# Patient Record
Sex: MALE | Race: WHITE | Employment: OTHER | URBAN - METROPOLITAN AREA
[De-identification: names, ages, dates, MRNs, and addresses within clinical notes are randomized per-mention and may not be internally consistent; named-entity substitution may affect disease eponyms.]

---

## 2020-03-12 ENCOUNTER — OFFICE VISIT (OUTPATIENT)
Dept: FAMILY MEDICINE CLINIC | Age: 60
End: 2020-03-12
Payer: COMMERCIAL

## 2020-03-12 VITALS
WEIGHT: 222.2 LBS | DIASTOLIC BLOOD PRESSURE: 78 MMHG | BODY MASS INDEX: 31.81 KG/M2 | SYSTOLIC BLOOD PRESSURE: 122 MMHG | HEIGHT: 70 IN | RESPIRATION RATE: 16 BRPM | TEMPERATURE: 97.4 F | HEART RATE: 60 BPM

## 2020-03-12 PROBLEM — E10.41 TYPE 1 DIABETES MELLITUS WITH DIABETIC MONONEUROPATHY (HCC): Status: ACTIVE | Noted: 2020-03-12

## 2020-03-12 PROBLEM — E10.41 DIABETIC MONONEUROPATHY ASSOCIATED WITH TYPE 1 DIABETES MELLITUS (HCC): Status: ACTIVE | Noted: 2020-03-12

## 2020-03-12 PROBLEM — M25.511 CHRONIC RIGHT SHOULDER PAIN: Status: ACTIVE | Noted: 2020-03-12

## 2020-03-12 PROBLEM — J10.1 INFLUENZA A: Status: ACTIVE | Noted: 2020-01-16

## 2020-03-12 PROBLEM — I10 ESSENTIAL HYPERTENSION: Status: ACTIVE | Noted: 2020-03-12

## 2020-03-12 PROBLEM — G89.29 CHRONIC LOW BACK PAIN: Status: ACTIVE | Noted: 2020-03-12

## 2020-03-12 PROBLEM — M54.50 CHRONIC LOW BACK PAIN: Status: ACTIVE | Noted: 2020-03-12

## 2020-03-12 PROBLEM — G89.29 CHRONIC RIGHT SHOULDER PAIN: Status: ACTIVE | Noted: 2020-03-12

## 2020-03-12 PROBLEM — E78.2 MIXED HYPERLIPIDEMIA: Status: ACTIVE | Noted: 2020-03-12

## 2020-03-12 PROBLEM — R91.1 LUNG NODULE: Status: ACTIVE | Noted: 2020-03-12

## 2020-03-12 PROCEDURE — 99204 OFFICE O/P NEW MOD 45 MIN: CPT | Performed by: NURSE PRACTITIONER

## 2020-03-12 RX ORDER — BROMPHENIRAMINE MALEATE, PSEUDOEPHEDRINE HYDROCHLORIDE, AND DEXTROMETHORPHAN HYDROBROMIDE 2; 30; 10 MG/5ML; MG/5ML; MG/5ML
SYRUP ORAL
COMMUNITY
Start: 2020-01-16 | End: 2020-03-12 | Stop reason: ALTCHOICE

## 2020-03-12 RX ORDER — GABAPENTIN 300 MG/1
600 CAPSULE ORAL NIGHTLY
COMMUNITY
Start: 2020-03-07

## 2020-03-12 RX ORDER — INSULIN ASPART 100 [IU]/ML
INJECTION, SOLUTION INTRAVENOUS; SUBCUTANEOUS
COMMUNITY
Start: 2020-03-07

## 2020-03-12 RX ORDER — SERTRALINE HYDROCHLORIDE 100 MG/1
150 TABLET, FILM COATED ORAL DAILY
COMMUNITY
Start: 2020-03-09

## 2020-03-12 RX ORDER — BENZONATATE 200 MG/1
200 CAPSULE ORAL 3 TIMES DAILY PRN
COMMUNITY
Start: 2020-01-16 | End: 2020-05-11

## 2020-03-12 RX ORDER — FLUTICASONE PROPIONATE 50 MCG
1 SPRAY, SUSPENSION (ML) NASAL DAILY
COMMUNITY
Start: 2020-01-16 | End: 2020-03-12 | Stop reason: ALTCHOICE

## 2020-03-12 RX ORDER — ATENOLOL 50 MG/1
100 TABLET ORAL DAILY
Status: ON HOLD | COMMUNITY
End: 2020-05-12 | Stop reason: HOSPADM

## 2020-03-12 RX ORDER — INSULIN DEGLUDEC 200 U/ML
80 INJECTION, SOLUTION SUBCUTANEOUS DAILY
COMMUNITY
Start: 2020-03-07

## 2020-03-12 RX ORDER — SIMVASTATIN 80 MG
80 TABLET ORAL DAILY
COMMUNITY

## 2020-03-12 RX ORDER — QUINAPRIL 40 MG/1
40 TABLET ORAL
COMMUNITY

## 2020-03-12 RX ORDER — BLOOD-GLUCOSE METER
KIT MISCELLANEOUS
COMMUNITY
Start: 2020-03-02

## 2020-03-12 SDOH — ECONOMIC STABILITY: FOOD INSECURITY: WITHIN THE PAST 12 MONTHS, YOU WORRIED THAT YOUR FOOD WOULD RUN OUT BEFORE YOU GOT MONEY TO BUY MORE.: NEVER TRUE

## 2020-03-12 SDOH — ECONOMIC STABILITY: FOOD INSECURITY: WITHIN THE PAST 12 MONTHS, THE FOOD YOU BOUGHT JUST DIDN'T LAST AND YOU DIDN'T HAVE MONEY TO GET MORE.: NEVER TRUE

## 2020-03-12 SDOH — ECONOMIC STABILITY: INCOME INSECURITY: HOW HARD IS IT FOR YOU TO PAY FOR THE VERY BASICS LIKE FOOD, HOUSING, MEDICAL CARE, AND HEATING?: NOT HARD AT ALL

## 2020-03-12 ASSESSMENT — PATIENT HEALTH QUESTIONNAIRE - PHQ9
SUM OF ALL RESPONSES TO PHQ QUESTIONS 1-9: 0
2. FEELING DOWN, DEPRESSED OR HOPELESS: 0
1. LITTLE INTEREST OR PLEASURE IN DOING THINGS: 0
SUM OF ALL RESPONSES TO PHQ QUESTIONS 1-9: 0
SUM OF ALL RESPONSES TO PHQ9 QUESTIONS 1 & 2: 0

## 2020-03-12 ASSESSMENT — ENCOUNTER SYMPTOMS
SHORTNESS OF BREATH: 0
BACK PAIN: 1
COUGH: 0
ABDOMINAL PAIN: 0
NAUSEA: 0

## 2020-03-12 NOTE — PROGRESS NOTES
2 years and had some workout which was unremarkable. Also c/o left flank area for a year and has tried TENs unit with mild relief. States he also had lung lesion and used to follow up with pulmonary. No further imaging study secondary to insurance restrictions. Pt is a remote smoker and quit 1990's     Review of Systems   Constitutional: Negative for chills, diaphoresis and fever. Eyes: Negative for visual disturbance. Respiratory: Negative for cough and shortness of breath. Cardiovascular: Negative for chest pain and palpitations. Gastrointestinal: Negative for abdominal pain and nausea. RUQ abd pain   Endocrine: Negative for polydipsia and polyuria. Genitourinary: Positive for flank pain. Negative for dysuria and hematuria. Musculoskeletal: Positive for arthralgias and back pain. Skin: Negative for wound. Neurological: Positive for numbness (and tingling BLEs). Negative for dizziness and weakness. Psychiatric/Behavioral: Negative for agitation and behavioral problems. Objective:   Physical Exam  Vitals signs and nursing note reviewed. Constitutional:       General: He is not in acute distress. Appearance: Normal appearance. He is obese. HENT:      Nose: Nose normal. No congestion. Eyes:      General: No scleral icterus. Conjunctiva/sclera: Conjunctivae normal.   Neck:      Musculoskeletal: Neck supple. Cardiovascular:      Rate and Rhythm: Normal rate and regular rhythm. Pulses: Normal pulses. Heart sounds: Normal heart sounds. Pulmonary:      Effort: Pulmonary effort is normal. No respiratory distress. Breath sounds: Normal breath sounds. Abdominal:      Palpations: Abdomen is soft. Tenderness: There is abdominal tenderness. Musculoskeletal:         General: Tenderness present. No swelling. Lumbar back: He exhibits tenderness and spasm. Back:    Lymphadenopathy:      Cervical: No cervical adenopathy.    Skin:     General: Skin is warm and dry. Neurological:      Mental Status: He is alert and oriented to person, place, and time. Cranial Nerves: No cranial nerve deficit. Psychiatric:         Mood and Affect: Mood normal.         Behavior: Behavior normal.       Assessment:      1. Type 1 diabetes mellitus with diabetic mononeuropathy (HCC)    2. Essential hypertension    3. Mixed hyperlipidemia    4. Diabetic mononeuropathy associated with type 1 diabetes mellitus ([facility] [address]    5. RUQ abdominal pain    6. Chronic left-sided low back pain without sciatica    7. Screen for colon cancer          Plan:       BP Readings from Last 3 Encounters:   03/12/20 122/78     /78 (Site: Left Upper Arm, Position: Sitting, Cuff Size: Medium Adult)   Pulse 60   Temp 97.4 °F (36.3 °C) (Oral)   Resp 16   Ht 5' 10\" (1.778 m)   Wt 222 lb 3.2 oz (100.8 kg)   BMI 31.88 kg/m²   No results found for: WBC, HGB, HCT, PLT, CHOL, TRIG, HDL, LDLDIRECT, ALT, AST, NA, K, CL, CREATININE, BUN, CO2, TSH, PSA, INR, GLUF, LABA1C, LABMICR  No results found for: CALCIUM, PHOS  No results found for: LDLCALC, LDLCHOLESTEROL, LDLDIRECT      1. Type 1 diabetes mellitus with diabetic mononeuropathy (HCC)  - cont current therapy   - will obtain records from Dr. Mike Sears  - extensive discussion with pt counseling on good control of DM to prevent complications. Pt verbalizes understanding and is agreeable to make diet modifications. 2. Essential hypertension  - stable. No therapy change     3. Mixed hyperlipidemia  - cont statin therapy   - Diet, exercise and weight reduction discussed. 4. Diabetic mononeuropathy associated with type 1 diabetes mellitus ([facility] [address]  - stable. Cont neurontin    5. RUQ abdominal pain/ 6. Chronic left-sided low back pain without sciatica  - obtain records from Dr. Raul Blood office   - labs pending on what has been done per previous pcp     7.  Screen colon cancer   - refer to Gi for conoscopy    Requested Prescriptions      No

## 2020-03-24 ENCOUNTER — TELEPHONE (OUTPATIENT)
Dept: GASTROENTEROLOGY | Age: 60
End: 2020-03-24

## 2020-03-24 RX ORDER — POLYETHYLENE GLYCOL 3350 17 G/17G
POWDER, FOR SOLUTION ORAL
Qty: 238 G | Refills: 0 | Status: ON HOLD | OUTPATIENT
Start: 2020-03-24 | End: 2020-05-11

## 2020-04-11 PROBLEM — J10.1 INFLUENZA A: Status: RESOLVED | Noted: 2020-01-16 | Resolved: 2020-04-11

## 2020-05-04 ENCOUNTER — TELEPHONE (OUTPATIENT)
Dept: GASTROENTEROLOGY | Age: 60
End: 2020-05-04

## 2020-05-11 ENCOUNTER — APPOINTMENT (OUTPATIENT)
Dept: CT IMAGING | Age: 60
End: 2020-05-11
Payer: COMMERCIAL

## 2020-05-11 ENCOUNTER — HOSPITAL ENCOUNTER (OUTPATIENT)
Age: 60
Setting detail: OBSERVATION
Discharge: HOME | End: 2020-05-12
Attending: EMERGENCY MEDICINE | Admitting: INTERNAL MEDICINE
Payer: COMMERCIAL

## 2020-05-11 ENCOUNTER — APPOINTMENT (OUTPATIENT)
Dept: GENERAL RADIOLOGY | Age: 60
End: 2020-05-11
Payer: COMMERCIAL

## 2020-05-11 PROBLEM — R00.1 BRADYCARDIA: Status: ACTIVE | Noted: 2020-05-11

## 2020-05-11 LAB
ABSOLUTE EOS #: 0.1 K/UL (ref 0–0.4)
ABSOLUTE IMMATURE GRANULOCYTE: ABNORMAL K/UL (ref 0–0.3)
ABSOLUTE LYMPH #: 2.8 K/UL (ref 1–4.8)
ABSOLUTE MONO #: 1.4 K/UL (ref 0.1–1.3)
ALBUMIN SERPL-MCNC: 4.4 G/DL (ref 3.5–5.2)
ALBUMIN/GLOBULIN RATIO: ABNORMAL (ref 1–2.5)
ALP BLD-CCNC: 62 U/L (ref 40–129)
ALT SERPL-CCNC: 37 U/L (ref 5–41)
ANION GAP SERPL CALCULATED.3IONS-SCNC: 16 MMOL/L (ref 9–17)
AST SERPL-CCNC: 24 U/L
BASOPHILS # BLD: 1 % (ref 0–2)
BASOPHILS ABSOLUTE: 0.1 K/UL (ref 0–0.2)
BILIRUB SERPL-MCNC: 0.66 MG/DL (ref 0.3–1.2)
BNP INTERPRETATION: NORMAL
BUN BLDV-MCNC: 27 MG/DL (ref 8–23)
BUN/CREAT BLD: ABNORMAL (ref 9–20)
CALCIUM SERPL-MCNC: 10.5 MG/DL (ref 8.6–10.4)
CHLORIDE BLD-SCNC: 97 MMOL/L (ref 98–107)
CO2: 26 MMOL/L (ref 20–31)
CREAT SERPL-MCNC: 1.89 MG/DL (ref 0.7–1.2)
DIFFERENTIAL TYPE: ABNORMAL
EOSINOPHILS RELATIVE PERCENT: 1 % (ref 0–4)
GFR AFRICAN AMERICAN: 44 ML/MIN
GFR NON-AFRICAN AMERICAN: 36 ML/MIN
GFR SERPL CREATININE-BSD FRML MDRD: ABNORMAL ML/MIN/{1.73_M2}
GFR SERPL CREATININE-BSD FRML MDRD: ABNORMAL ML/MIN/{1.73_M2}
GLUCOSE BLD-MCNC: 105 MG/DL (ref 70–99)
GLUCOSE BLD-MCNC: 105 MG/DL (ref 75–110)
GLUCOSE BLD-MCNC: 272 MG/DL (ref 75–110)
HCT VFR BLD CALC: 49.1 % (ref 41–53)
HEMOGLOBIN: 16.7 G/DL (ref 13.5–17.5)
IMMATURE GRANULOCYTES: ABNORMAL %
LIPASE: 26 U/L (ref 13–60)
LYMPHOCYTES # BLD: 18 % (ref 24–44)
MCH RBC QN AUTO: 29.6 PG (ref 26–34)
MCHC RBC AUTO-ENTMCNC: 34.1 G/DL (ref 31–37)
MCV RBC AUTO: 86.8 FL (ref 80–100)
MONOCYTES # BLD: 9 % (ref 1–7)
MYOGLOBIN: 171 NG/ML (ref 28–72)
MYOGLOBIN: 216 NG/ML (ref 28–72)
NRBC AUTOMATED: ABNORMAL PER 100 WBC
PDW BLD-RTO: 14.7 % (ref 11.5–14.9)
PLATELET # BLD: 196 K/UL (ref 150–450)
PLATELET ESTIMATE: ABNORMAL
PMV BLD AUTO: 9.8 FL (ref 6–12)
POTASSIUM SERPL-SCNC: 4 MMOL/L (ref 3.7–5.3)
PRO-BNP: 53 PG/ML
RBC # BLD: 5.65 M/UL (ref 4.5–5.9)
RBC # BLD: ABNORMAL 10*6/UL
SEG NEUTROPHILS: 71 % (ref 36–66)
SEGMENTED NEUTROPHILS ABSOLUTE COUNT: 11.1 K/UL (ref 1.3–9.1)
SODIUM BLD-SCNC: 139 MMOL/L (ref 135–144)
THYROXINE, FREE: 1.06 NG/DL (ref 0.93–1.7)
TOTAL PROTEIN: 7.7 G/DL (ref 6.4–8.3)
TROPONIN INTERP: ABNORMAL
TROPONIN INTERP: ABNORMAL
TROPONIN T: ABNORMAL NG/ML
TROPONIN T: ABNORMAL NG/ML
TROPONIN, HIGH SENSITIVITY: 40 NG/L (ref 0–22)
TROPONIN, HIGH SENSITIVITY: 58 NG/L (ref 0–22)
TSH SERPL DL<=0.05 MIU/L-ACNC: 3.21 MIU/L (ref 0.3–5)
WBC # BLD: 15.5 K/UL (ref 3.5–11)
WBC # BLD: ABNORMAL 10*3/UL

## 2020-05-11 PROCEDURE — 74176 CT ABD & PELVIS W/O CONTRAST: CPT

## 2020-05-11 PROCEDURE — 84439 ASSAY OF FREE THYROXINE: CPT

## 2020-05-11 PROCEDURE — 99285 EMERGENCY DEPT VISIT HI MDM: CPT

## 2020-05-11 PROCEDURE — 85025 COMPLETE CBC W/AUTO DIFF WBC: CPT

## 2020-05-11 PROCEDURE — 84484 ASSAY OF TROPONIN QUANT: CPT

## 2020-05-11 PROCEDURE — 81001 URINALYSIS AUTO W/SCOPE: CPT

## 2020-05-11 PROCEDURE — 2580000003 HC RX 258: Performed by: EMERGENCY MEDICINE

## 2020-05-11 PROCEDURE — 83874 ASSAY OF MYOGLOBIN: CPT

## 2020-05-11 PROCEDURE — 96374 THER/PROPH/DIAG INJ IV PUSH: CPT

## 2020-05-11 PROCEDURE — 83880 ASSAY OF NATRIURETIC PEPTIDE: CPT

## 2020-05-11 PROCEDURE — 6360000002 HC RX W HCPCS: Performed by: EMERGENCY MEDICINE

## 2020-05-11 PROCEDURE — G0378 HOSPITAL OBSERVATION PER HR: HCPCS

## 2020-05-11 PROCEDURE — 84443 ASSAY THYROID STIM HORMONE: CPT

## 2020-05-11 PROCEDURE — 83690 ASSAY OF LIPASE: CPT

## 2020-05-11 PROCEDURE — 80053 COMPREHEN METABOLIC PANEL: CPT

## 2020-05-11 PROCEDURE — 36415 COLL VENOUS BLD VENIPUNCTURE: CPT

## 2020-05-11 PROCEDURE — 82947 ASSAY GLUCOSE BLOOD QUANT: CPT

## 2020-05-11 PROCEDURE — 99220 PR INITIAL OBSERVATION CARE/DAY 70 MINUTES: CPT | Performed by: INTERNAL MEDICINE

## 2020-05-11 PROCEDURE — 71045 X-RAY EXAM CHEST 1 VIEW: CPT

## 2020-05-11 PROCEDURE — 93005 ELECTROCARDIOGRAM TRACING: CPT | Performed by: EMERGENCY MEDICINE

## 2020-05-11 RX ORDER — SIMVASTATIN 40 MG
80 TABLET ORAL DAILY
Status: DISCONTINUED | OUTPATIENT
Start: 2020-05-12 | End: 2020-05-12 | Stop reason: HOSPADM

## 2020-05-11 RX ORDER — DEXTROSE MONOHYDRATE 50 MG/ML
100 INJECTION, SOLUTION INTRAVENOUS PRN
Status: DISCONTINUED | OUTPATIENT
Start: 2020-05-11 | End: 2020-05-12 | Stop reason: HOSPADM

## 2020-05-11 RX ORDER — POLYETHYLENE GLYCOL 3350 17 G/17G
17 POWDER, FOR SOLUTION ORAL DAILY PRN
Status: DISCONTINUED | OUTPATIENT
Start: 2020-05-11 | End: 2020-05-12 | Stop reason: HOSPADM

## 2020-05-11 RX ORDER — MAGNESIUM SULFATE 1 G/100ML
1 INJECTION INTRAVENOUS PRN
Status: DISCONTINUED | OUTPATIENT
Start: 2020-05-11 | End: 2020-05-12 | Stop reason: HOSPADM

## 2020-05-11 RX ORDER — ACETAMINOPHEN 325 MG/1
650 TABLET ORAL EVERY 6 HOURS PRN
Status: DISCONTINUED | OUTPATIENT
Start: 2020-05-11 | End: 2020-05-12 | Stop reason: HOSPADM

## 2020-05-11 RX ORDER — DEXTROSE MONOHYDRATE 25 G/50ML
12.5 INJECTION, SOLUTION INTRAVENOUS PRN
Status: DISCONTINUED | OUTPATIENT
Start: 2020-05-11 | End: 2020-05-12 | Stop reason: HOSPADM

## 2020-05-11 RX ORDER — 0.9 % SODIUM CHLORIDE 0.9 %
1000 INTRAVENOUS SOLUTION INTRAVENOUS ONCE
Status: COMPLETED | OUTPATIENT
Start: 2020-05-11 | End: 2020-05-11

## 2020-05-11 RX ORDER — INSULIN GLARGINE 100 [IU]/ML
80 INJECTION, SOLUTION SUBCUTANEOUS DAILY
Status: DISCONTINUED | OUTPATIENT
Start: 2020-05-12 | End: 2020-05-12 | Stop reason: HOSPADM

## 2020-05-11 RX ORDER — MORPHINE SULFATE 4 MG/ML
4 INJECTION, SOLUTION INTRAMUSCULAR; INTRAVENOUS ONCE
Status: DISCONTINUED | OUTPATIENT
Start: 2020-05-11 | End: 2020-05-12 | Stop reason: HOSPADM

## 2020-05-11 RX ORDER — ONDANSETRON 2 MG/ML
4 INJECTION INTRAMUSCULAR; INTRAVENOUS EVERY 6 HOURS PRN
Status: DISCONTINUED | OUTPATIENT
Start: 2020-05-11 | End: 2020-05-12 | Stop reason: HOSPADM

## 2020-05-11 RX ORDER — GABAPENTIN 300 MG/1
600 CAPSULE ORAL NIGHTLY
Status: DISCONTINUED | OUTPATIENT
Start: 2020-05-12 | End: 2020-05-12 | Stop reason: HOSPADM

## 2020-05-11 RX ORDER — PROMETHAZINE HYDROCHLORIDE 25 MG/1
12.5 TABLET ORAL EVERY 6 HOURS PRN
Status: DISCONTINUED | OUTPATIENT
Start: 2020-05-11 | End: 2020-05-12 | Stop reason: HOSPADM

## 2020-05-11 RX ORDER — POTASSIUM CHLORIDE 7.45 MG/ML
10 INJECTION INTRAVENOUS PRN
Status: DISCONTINUED | OUTPATIENT
Start: 2020-05-11 | End: 2020-05-12 | Stop reason: HOSPADM

## 2020-05-11 RX ORDER — IBUPROFEN 600 MG/1
600 TABLET ORAL NIGHTLY
COMMUNITY

## 2020-05-11 RX ORDER — SODIUM CHLORIDE 0.9 % (FLUSH) 0.9 %
10 SYRINGE (ML) INJECTION EVERY 12 HOURS SCHEDULED
Status: DISCONTINUED | OUTPATIENT
Start: 2020-05-12 | End: 2020-05-12 | Stop reason: HOSPADM

## 2020-05-11 RX ORDER — ACETAMINOPHEN 650 MG/1
650 SUPPOSITORY RECTAL EVERY 6 HOURS PRN
Status: DISCONTINUED | OUTPATIENT
Start: 2020-05-11 | End: 2020-05-12 | Stop reason: HOSPADM

## 2020-05-11 RX ORDER — POTASSIUM CHLORIDE 20 MEQ/1
40 TABLET, EXTENDED RELEASE ORAL PRN
Status: DISCONTINUED | OUTPATIENT
Start: 2020-05-11 | End: 2020-05-12 | Stop reason: HOSPADM

## 2020-05-11 RX ORDER — SODIUM CHLORIDE 0.9 % (FLUSH) 0.9 %
10 SYRINGE (ML) INJECTION PRN
Status: DISCONTINUED | OUTPATIENT
Start: 2020-05-11 | End: 2020-05-12 | Stop reason: HOSPADM

## 2020-05-11 RX ORDER — NICOTINE POLACRILEX 4 MG
15 LOZENGE BUCCAL PRN
Status: DISCONTINUED | OUTPATIENT
Start: 2020-05-11 | End: 2020-05-12 | Stop reason: HOSPADM

## 2020-05-11 RX ORDER — ONDANSETRON 2 MG/ML
4 INJECTION INTRAMUSCULAR; INTRAVENOUS ONCE
Status: COMPLETED | OUTPATIENT
Start: 2020-05-11 | End: 2020-05-11

## 2020-05-11 RX ADMIN — ONDANSETRON 4 MG: 2 INJECTION INTRAMUSCULAR; INTRAVENOUS at 18:00

## 2020-05-11 RX ADMIN — SODIUM CHLORIDE 1000 ML: 9 INJECTION, SOLUTION INTRAVENOUS at 18:00

## 2020-05-11 ASSESSMENT — ENCOUNTER SYMPTOMS
WHEEZING: 0
DIARRHEA: 0
ABDOMINAL PAIN: 0
CONSTIPATION: 0
EYE PAIN: 0
SINUS PRESSURE: 0
VOMITING: 1
BLOOD IN STOOL: 0
BACK PAIN: 0
TROUBLE SWALLOWING: 0
SORE THROAT: 0
COUGH: 0
EYE DISCHARGE: 0
COLOR CHANGE: 0
FACIAL SWELLING: 0
RHINORRHEA: 0
SHORTNESS OF BREATH: 1
NAUSEA: 1
CHEST TIGHTNESS: 0
EYE REDNESS: 0

## 2020-05-11 ASSESSMENT — PAIN SCALES - GENERAL: PAINLEVEL_OUTOF10: 3

## 2020-05-11 NOTE — ED PROVIDER NOTES
use. He reports that he does not use drugs. PHYSICAL EXAM     INITIAL VITALS: /68   Pulse 50   Temp 97.4 °F (36.3 °C) (Oral)   Resp 15   Ht 5' 10\" (1.778 m)   Wt 220 lb (99.8 kg)   SpO2 100%   BMI 31.57 kg/m²      Physical Exam  Vitals signs and nursing note reviewed. Constitutional:       General: He is not in acute distress. Appearance: He is well-developed. He is not diaphoretic. HENT:      Head: Normocephalic and atraumatic. Eyes:      General: No scleral icterus. Right eye: No discharge. Left eye: No discharge. Conjunctiva/sclera: Conjunctivae normal.      Pupils: Pupils are equal, round, and reactive to light. Cardiovascular:      Rate and Rhythm: Regular rhythm. Bradycardia present. Heart sounds: Normal heart sounds. No murmur. No friction rub. No gallop. Pulmonary:      Effort: Pulmonary effort is normal. No respiratory distress. Breath sounds: Normal breath sounds. No wheezing or rales. Chest:      Chest wall: No tenderness. Abdominal:      General: Bowel sounds are normal. There is no distension. Palpations: Abdomen is soft. There is no mass. Tenderness: There is generalized abdominal tenderness. There is no guarding or rebound. Negative signs include Martins's sign and McBurney's sign. Musculoskeletal: Normal range of motion. General: No tenderness. Skin:     General: Skin is warm and dry. Coloration: Skin is not pale. Findings: No erythema or rash. Neurological:      Mental Status: He is alert and oriented to person, place, and time. Cranial Nerves: No cranial nerve deficit. Sensory: No sensory deficit. Motor: No abnormal muscle tone. Coordination: Coordination normal.      Deep Tendon Reflexes: Reflexes normal.   Psychiatric:         Behavior: Behavior normal.         Thought Content:  Thought content normal.         Judgment: Judgment normal.         MEDICAL DECISION MAKING:     Patient demonstrates mild calcification without aneurysm. GI/Bowel: Stomach is grossly unremarkable. Small bowel appears nondilated. No acute colonic abnormality. Pelvis: Urinary bladder and prostate gland all appear grossly unremarkable. Peritoneum/Retroperitoneum: No free air, free fluid or lymphadenopathy. Bones/Soft Tissues: Abdominal wall demonstrates no acute findings. Osseous structures demonstrate degenerative change. Hardware fixation presumed L4-L5 level. No acute intra-abdominal process. Xr Chest Portable    Result Date: 5/11/2020  EXAMINATION: ONE XRAY VIEW OF THE CHEST 5/11/2020 6:37 pm COMPARISON: None. HISTORY: ORDERING SYSTEM PROVIDED HISTORY: Chest Pain TECHNOLOGIST PROVIDED HISTORY: Chest Pain Reason for Exam: Chest pain Acuity: Acute Type of Exam: Initial FINDINGS: The lungs are without acute focal process. There is no effusion or pneumothorax. The cardiomediastinal silhouette is without acute process. The osseous structures are without acute process. No acute process. LABS: All lab results were reviewed byarlyn, and all abnormals are listed below.   Labs Reviewed   CBC WITH AUTO DIFFERENTIAL - Abnormal; Notable for the following components:       Result Value    WBC 15.5 (*)     Seg Neutrophils 71 (*)     Lymphocytes 18 (*)     Monocytes 9 (*)     Segs Absolute 11.10 (*)     Absolute Mono # 1.40 (*)     All other components within normal limits   TROP/MYOGLOBIN - Abnormal; Notable for the following components:    Troponin, High Sensitivity 58 (*)     Myoglobin 216 (*)     All other components within normal limits   COMPREHENSIVE METABOLIC PANEL - Abnormal; Notable for the following components:    Glucose 105 (*)     BUN 27 (*)     CREATININE 1.89 (*)     Calcium 10.5 (*)     Chloride 97 (*)     GFR Non- 36 (*)     GFR  44 (*)     All other components within normal limits   BRAIN NATRIURETIC PEPTIDE   TSH WITHOUT REFLEX   T4, FREE   LIPASE TROP/MYOGLOBIN   URINE RT REFLEX TO CULTURE   POC GLUCOSE FINGERSTICK         EMERGENCY DEPARTMENT COURSE:   Vitals:    Vitals:    05/11/20 1830 05/11/20 1845 05/11/20 1900 05/11/20 1915   BP: (!) 97/51 (!) 93/59 116/64 117/68   Pulse: (!) 48 (!) 49 (!) 49 50   Resp: 18 20 16 15   Temp:       TempSrc:       SpO2: 100% 99% 100% 100%   Weight:       Height:           The patient was given the following medications while in the emergency department:     Orders Placed This Encounter   Medications    0.9 % sodium chloride bolus    ondansetron (ZOFRAN) injection 4 mg    morphine sulfate (PF) injection 4 mg    DISCONTD: piperacillin-tazobactam (ZOSYN) 4.5 g in dextrose 5 % 100 mL IVPB (mini-bag)       -------------------------  8:19 PM EDT  Patient was updated on results he is feeling better and his blood pressure has improved with some fluids. Because of his initial presentation and lab work I would like to get him admitted for now. He is okay with that. I spoke with Gerber Dawson the nurse practitioner who will admit him. CRITICAL CARE:   None    CONSULTS:  None    PROCEDURES:  None    FINAL IMPRESSION      1. Bradycardia    2. Dehydration          DISPOSITION/PLAN   DISPOSITION Admitted 05/11/2020 08:19:16 PM      PATIENT REFERRED TO:  No follow-up provider specified.     DISCHARGE MEDICATIONS:  New Prescriptions    No medications on file       (Please note that portions of this note were completed with a voice recognition program.  Efforts were made to edit the dictations but occasionally words are mis-transcribed.)    Eran Jaramillo MD  Attending Colletta Emmer, MD  05/11/20 2021

## 2020-05-11 NOTE — ED TRIAGE NOTES
Mode of arrival (squad #, walk in, police, etc) : drop off        Chief complaint(s): SOB, lightheaded        Arrival Note (brief scenario, treatment PTA, etc). : Pt states he is feeling SOB since last night. Pt also reports that his BS at home was 110 but he states his normal is around 250. Pt reports feeling lightheaded and fatigued. Pt is [place], pt appears unwell. Pt is A&Ox4, respirations are even and unlabored, pt ia hypotensive and bradycardic. Pt reports his blood pressure is normally on the lower side. C= \"Have you ever felt that you should Cut down on your drinking? \"  No  A= \"Have people Annoyed you by criticizing your drinking? \"  No  G= \"Have you ever felt bad or Guilty about your drinking? \"  No  E= \"Have you ever had a drink as an Eye-opener first thing in the morning to steady your nerves or to help a hangover? \"  No      Deferred []      Reason for deferring: N/A    *If yes to two or more: probable alcohol abuse. *

## 2020-05-12 ENCOUNTER — APPOINTMENT (OUTPATIENT)
Dept: NUCLEAR MEDICINE | Age: 60
End: 2020-05-12
Payer: COMMERCIAL

## 2020-05-12 VITALS
TEMPERATURE: 97.5 F | WEIGHT: 224.87 LBS | HEIGHT: 70 IN | HEART RATE: 61 BPM | RESPIRATION RATE: 18 BRPM | BODY MASS INDEX: 32.19 KG/M2 | SYSTOLIC BLOOD PRESSURE: 127 MMHG | DIASTOLIC BLOOD PRESSURE: 68 MMHG | OXYGEN SATURATION: 100 %

## 2020-05-12 LAB
-: ABNORMAL
AMORPHOUS: ABNORMAL
ANION GAP SERPL CALCULATED.3IONS-SCNC: 12 MMOL/L (ref 9–17)
BACTERIA: ABNORMAL
BILIRUBIN URINE: NEGATIVE
BUN BLDV-MCNC: 34 MG/DL (ref 8–23)
BUN/CREAT BLD: ABNORMAL (ref 9–20)
CALCIUM SERPL-MCNC: 9 MG/DL (ref 8.6–10.4)
CASTS UA: ABNORMAL /LPF
CHLORIDE BLD-SCNC: 97 MMOL/L (ref 98–107)
CO2: 26 MMOL/L (ref 20–31)
COLOR: ABNORMAL
COMMENT UA: ABNORMAL
CREAT SERPL-MCNC: 1.48 MG/DL (ref 0.7–1.2)
CRYSTALS, UA: ABNORMAL /HPF
CRYSTALS, UA: ABNORMAL /HPF
EKG ATRIAL RATE: 48 BPM
EKG ATRIAL RATE: 50 BPM
EKG P AXIS: 34 DEGREES
EKG P AXIS: 36 DEGREES
EKG P-R INTERVAL: 138 MS
EKG P-R INTERVAL: 144 MS
EKG Q-T INTERVAL: 466 MS
EKG Q-T INTERVAL: 476 MS
EKG QRS DURATION: 78 MS
EKG QRS DURATION: 84 MS
EKG QTC CALCULATION (BAZETT): 424 MS
EKG QTC CALCULATION (BAZETT): 425 MS
EKG R AXIS: -7 DEGREES
EKG R AXIS: -9 DEGREES
EKG T AXIS: 32 DEGREES
EKG T AXIS: 58 DEGREES
EKG VENTRICULAR RATE: 48 BPM
EKG VENTRICULAR RATE: 50 BPM
EPITHELIAL CELLS UA: ABNORMAL /HPF
GFR AFRICAN AMERICAN: 59 ML/MIN
GFR NON-AFRICAN AMERICAN: 48 ML/MIN
GFR SERPL CREATININE-BSD FRML MDRD: ABNORMAL ML/MIN/{1.73_M2}
GFR SERPL CREATININE-BSD FRML MDRD: ABNORMAL ML/MIN/{1.73_M2}
GLUCOSE BLD-MCNC: 304 MG/DL (ref 75–110)
GLUCOSE BLD-MCNC: 324 MG/DL (ref 75–110)
GLUCOSE BLD-MCNC: 359 MG/DL (ref 70–99)
GLUCOSE BLD-MCNC: 382 MG/DL (ref 75–110)
GLUCOSE URINE: ABNORMAL
HCT VFR BLD CALC: 45.5 % (ref 41–53)
HEMOGLOBIN: 15.4 G/DL (ref 13.5–17.5)
INR BLD: 1
KETONES, URINE: ABNORMAL
LEUKOCYTE ESTERASE, URINE: NEGATIVE
LV EF: 55 %
LV EF: 62 %
LVEF MODALITY: NORMAL
LVEF MODALITY: NORMAL
MCH RBC QN AUTO: 29.8 PG (ref 26–34)
MCHC RBC AUTO-ENTMCNC: 33.9 G/DL (ref 31–37)
MCV RBC AUTO: 87.9 FL (ref 80–100)
MUCUS: ABNORMAL
NITRITE, URINE: NEGATIVE
NRBC AUTOMATED: ABNORMAL PER 100 WBC
OTHER OBSERVATIONS UA: ABNORMAL
PDW BLD-RTO: 15 % (ref 11.5–14.9)
PH UA: 5.5 (ref 5–8)
PLATELET # BLD: 138 K/UL (ref 150–450)
PMV BLD AUTO: 9.7 FL (ref 6–12)
POTASSIUM SERPL-SCNC: 4.5 MMOL/L (ref 3.7–5.3)
PROTEIN UA: ABNORMAL
PROTHROMBIN TIME: 13.3 SEC (ref 11.8–14.6)
RBC # BLD: 5.17 M/UL (ref 4.5–5.9)
RBC UA: ABNORMAL /HPF
RENAL EPITHELIAL, UA: ABNORMAL /HPF
SODIUM BLD-SCNC: 135 MMOL/L (ref 135–144)
SPECIFIC GRAVITY UA: 1.03 (ref 1–1.03)
TRICHOMONAS: ABNORMAL
TURBIDITY: ABNORMAL
URINE HGB: NEGATIVE
UROBILINOGEN, URINE: NORMAL
WBC # BLD: 10.9 K/UL (ref 3.5–11)
WBC UA: ABNORMAL /HPF
YEAST: ABNORMAL

## 2020-05-12 PROCEDURE — 93010 ELECTROCARDIOGRAM REPORT: CPT | Performed by: INTERNAL MEDICINE

## 2020-05-12 PROCEDURE — 6360000002 HC RX W HCPCS: Performed by: NURSE PRACTITIONER

## 2020-05-12 PROCEDURE — 6370000000 HC RX 637 (ALT 250 FOR IP): Performed by: NURSE PRACTITIONER

## 2020-05-12 PROCEDURE — 2580000003 HC RX 258: Performed by: INTERNAL MEDICINE

## 2020-05-12 PROCEDURE — 3430000000 HC RX DIAGNOSTIC RADIOPHARMACEUTICAL: Performed by: INTERNAL MEDICINE

## 2020-05-12 PROCEDURE — 6360000002 HC RX W HCPCS: Performed by: INTERNAL MEDICINE

## 2020-05-12 PROCEDURE — 36415 COLL VENOUS BLD VENIPUNCTURE: CPT

## 2020-05-12 PROCEDURE — G0378 HOSPITAL OBSERVATION PER HR: HCPCS

## 2020-05-12 PROCEDURE — 82947 ASSAY GLUCOSE BLOOD QUANT: CPT

## 2020-05-12 PROCEDURE — 80048 BASIC METABOLIC PNL TOTAL CA: CPT

## 2020-05-12 PROCEDURE — A9500 TC99M SESTAMIBI: HCPCS | Performed by: INTERNAL MEDICINE

## 2020-05-12 PROCEDURE — 93017 CV STRESS TEST TRACING ONLY: CPT

## 2020-05-12 PROCEDURE — 2580000003 HC RX 258: Performed by: NURSE PRACTITIONER

## 2020-05-12 PROCEDURE — 93306 TTE W/DOPPLER COMPLETE: CPT

## 2020-05-12 PROCEDURE — 85610 PROTHROMBIN TIME: CPT

## 2020-05-12 PROCEDURE — 78452 HT MUSCLE IMAGE SPECT MULT: CPT

## 2020-05-12 PROCEDURE — 85027 COMPLETE CBC AUTOMATED: CPT

## 2020-05-12 PROCEDURE — 99217 PR OBSERVATION CARE DISCHARGE MANAGEMENT: CPT | Performed by: INTERNAL MEDICINE

## 2020-05-12 RX ORDER — AMINOPHYLLINE DIHYDRATE 25 MG/ML
50 INJECTION, SOLUTION INTRAVENOUS PRN
Status: ACTIVE | OUTPATIENT
Start: 2020-05-12 | End: 2020-05-12

## 2020-05-12 RX ORDER — SODIUM CHLORIDE 0.9 % (FLUSH) 0.9 %
10 SYRINGE (ML) INJECTION PRN
Status: DISCONTINUED | OUTPATIENT
Start: 2020-05-12 | End: 2020-05-12 | Stop reason: HOSPADM

## 2020-05-12 RX ORDER — METOPROLOL TARTRATE 5 MG/5ML
5 INJECTION INTRAVENOUS EVERY 5 MIN PRN
Status: ACTIVE | OUTPATIENT
Start: 2020-05-12 | End: 2020-05-12

## 2020-05-12 RX ORDER — SODIUM CHLORIDE 0.9 % (FLUSH) 0.9 %
10 SYRINGE (ML) INJECTION PRN
Status: ACTIVE | OUTPATIENT
Start: 2020-05-12 | End: 2020-05-12

## 2020-05-12 RX ORDER — SODIUM CHLORIDE 9 MG/ML
500 INJECTION, SOLUTION INTRAVENOUS CONTINUOUS PRN
Status: ACTIVE | OUTPATIENT
Start: 2020-05-12 | End: 2020-05-12

## 2020-05-12 RX ORDER — ALBUTEROL SULFATE 90 UG/1
2 AEROSOL, METERED RESPIRATORY (INHALATION) PRN
Status: ACTIVE | OUTPATIENT
Start: 2020-05-12 | End: 2020-05-12

## 2020-05-12 RX ORDER — NITROGLYCERIN 0.4 MG/1
0.4 TABLET SUBLINGUAL EVERY 5 MIN PRN
Status: ACTIVE | OUTPATIENT
Start: 2020-05-12 | End: 2020-05-12

## 2020-05-12 RX ORDER — SODIUM CHLORIDE 9 MG/ML
INJECTION, SOLUTION INTRAVENOUS CONTINUOUS
Status: DISCONTINUED | OUTPATIENT
Start: 2020-05-12 | End: 2020-05-12 | Stop reason: HOSPADM

## 2020-05-12 RX ORDER — ATROPINE SULFATE 0.1 MG/ML
0.5 INJECTION INTRAVENOUS EVERY 5 MIN PRN
Status: ACTIVE | OUTPATIENT
Start: 2020-05-12 | End: 2020-05-12

## 2020-05-12 RX ADMIN — INSULIN LISPRO 8 UNITS: 100 INJECTION, SOLUTION INTRAVENOUS; SUBCUTANEOUS at 12:03

## 2020-05-12 RX ADMIN — SODIUM CHLORIDE: 9 INJECTION, SOLUTION INTRAVENOUS at 04:00

## 2020-05-12 RX ADMIN — Medication 10 ML: at 10:04

## 2020-05-12 RX ADMIN — SIMVASTATIN 80 MG: 40 TABLET, FILM COATED ORAL at 08:52

## 2020-05-12 RX ADMIN — Medication 10 ML: at 10:05

## 2020-05-12 RX ADMIN — TETRAKIS(2-METHOXYISOBUTYLISOCYANIDE)COPPER(I) TETRAFLUOROBORATE 39.4 MILLICURIE: 1 INJECTION, POWDER, LYOPHILIZED, FOR SOLUTION INTRAVENOUS at 12:32

## 2020-05-12 RX ADMIN — ENOXAPARIN SODIUM 40 MG: 40 INJECTION SUBCUTANEOUS at 08:48

## 2020-05-12 RX ADMIN — REGADENOSON 0.4 MG: 0.08 INJECTION, SOLUTION INTRAVENOUS at 10:04

## 2020-05-12 RX ADMIN — SERTRALINE HYDROCHLORIDE 150 MG: 100 TABLET ORAL at 08:47

## 2020-05-12 RX ADMIN — SODIUM CHLORIDE: 9 INJECTION, SOLUTION INTRAVENOUS at 12:40

## 2020-05-12 RX ADMIN — INSULIN LISPRO 3 UNITS: 100 INJECTION, SOLUTION INTRAVENOUS; SUBCUTANEOUS at 00:26

## 2020-05-12 RX ADMIN — TETRAKIS(2-METHOXYISOBUTYLISOCYANIDE)COPPER(I) TETRAFLUOROBORATE 14 MILLICURIE: 1 INJECTION, POWDER, LYOPHILIZED, FOR SOLUTION INTRAVENOUS at 10:05

## 2020-05-12 RX ADMIN — INSULIN GLARGINE 80 UNITS: 100 INJECTION, SOLUTION SUBCUTANEOUS at 08:47

## 2020-05-12 ASSESSMENT — ENCOUNTER SYMPTOMS
WHEEZING: 0
VOMITING: 1
SORE THROAT: 0
COUGH: 0
ABDOMINAL PAIN: 0
NAUSEA: 1
SHORTNESS OF BREATH: 1
CONSTIPATION: 0
DIARRHEA: 0
BACK PAIN: 0

## 2020-05-12 NOTE — H&P
[address] [facility]     HISTORY AND PHYSICAL EXAMINATION            Date:   5/12/2020  Patientname:  Sis Mcgill  Date of admission:  5/11/2020  5:37 PM  MRN:   [id number]  Account:  [de-identified]  YOB: 1960  PCP:    No primary care provider on file. Room:   2091/2091-01  Code Status:    Full Code    CHIEF COMPLAINT     Chief Complaint   Patient presents with    Shortness of Breath    Hypoglycemia     pt states low for him       HISTORY OF PRESENT ILLNESS  (Character, Onset, Location, Duration,  Exacerbating/RelievingFactors, Radiation,   Associated Symptoms, Severity )     The patient is a 62 y.o.  male, with a history of insulin-dependent diabetes mellitus with neuropathy, hypertension, chronic low back pain, and hyperlipidemia who presents with shortness of breath and hypoglycemia. According to patient, he developed shortness of breath and lightheadedness yesterday and has felt generally unwell since that time. Reports that on the previous evening he had an electrical sensation that initiated in his left shoulder and radiated across his left chest to his sternum every 3 to 4 seconds for several hours. Patient likens this sensation to wearing a TENS unit. Sensation was self-limiting and was not associated with pain. Symptoms are associated with generalized weakness and myalgias, with a sensation that he could not control his legs. Additionally, patient reports nausea with one episode of emesis earlier today. Denies fever, chills, chest pain, cough, abdominal pain, diarrhea, and urinary symptoms. There are no aggravating or alleviating factors. Symptoms are reported as constant and moderate.     PAST MEDICAL HISTORY   Patient  has a past medical history of Adenomatous polyp of colon, ALT (SGPT) level raised, Asbestos exposure, Asthma, BPH (benign prostatic hyperplasia), Diabetes mellitus ([address] ED (erectile dysfunction), Esophageal reflux, Hearing loss, Coloration: Skin is not pale. Findings: No erythema or rash. Neurological:      Mental Status: He is alert and oriented to person, place, and time. Motor: No seizure activity. Coordination: Coordination normal.   Psychiatric:         Behavior: Behavior normal.         Thought Content: Thought content normal.       DIAGNOSTICS      EKG:   EKG 12 Lead [[id number]]    Collected: 05/11/20 2026    Updated: 05/11/20 2032     Ventricular Rate 50 BPM    Atrial Rate 50 BPM    P-R Interval 144 ms    QRS Duration 78 ms    Q-T Interval 466 ms    QTc Calculation (Bazett) 424 ms    P Axis 36 degrees    R Axis -7 degrees    T Axis 32 degrees   Narrative:     * Poor data quality, interpretation may be adversely affected  Sinus bradycardia  Minimal voltage criteria for LVH, may be normal variant  Nonspecific T wave abnormality  Abnormal ECG  When compared with ECG of 11-MAY-2020 17:57, (unconfirmed)  No significant change was found     Labs:  CBC:   Recent Labs     05/11/20  1755   WBC 15.5*   HGB 16.7        BMP:    Recent Labs     05/11/20  1755      K 4.0   CL 97*   CO2 26   BUN 27*   CREATININE 1.89*   GLUCOSE 105*     S. Calcium:  Recent Labs     05/11/20 1755   CALCIUM 10.5*     S. Ionized Calcium:No results for input(s): IONCA in the last 72 hours. S. Magnesium:No results for input(s): MG in the last 72 hours. S. Phosphorus:No results for input(s): PHOS in the last 72 hours. S. Glucose:  Recent Labs     05/11/20  1751 05/11/20  2317   POCGLU 105 272*     Glycosylated hemoglobin A1C: No results found for: LABA1C  Hepatic:   Recent Labs     05/11/20  1755   AST 24   ALT 37   ALKPHOS 62     CARDIAC ENZY:   Recent Labs     05/11/20  1755 05/11/20  2208   TROPHS 58* 40*   MYOGLOBIN 216* 171*     INR: No results for input(s): INR in the last 72 hours. BNP:   Recent Labs     05/11/20  1755   PROBNP 53      ABGs: No results for input(s): PH, PCO2, PO2, HCO3, O2SAT in the last 72 hours.   Lipids: No

## 2020-05-12 NOTE — PROGRESS NOTES
PTs blood sugar noted at 382. Pt refuses to take insulin, says he will \"take care of it at home\". Pt is anxious to leave. Pt is very addiment that this blood sugar is normal for him. Extensive education about how hyperglycemia causes significant harm to the body has been provided to pt throughout the day and was strongly reinforced this evening. Pt continues to refuse insulin here and just wants to go home.      Electronically signed by Loni Arita RN on 5/12/2020 at 4:54 PM

## 2020-05-12 NOTE — ED NOTES
Report given to BEST tai from u. Report method by phone   The following was reviewed with receiving RN:   Current vital signs:  BP (!) 104/50   Pulse 56   Temp 97.4 °F (36.3 °C) (Oral)   Resp 16   Ht 5' 10\" (1.778 m)   Wt 220 lb (99.8 kg)   SpO2 100%   BMI 31.57 kg/m²                MEWS Score: 3     Any medication or safety alerts were reviewed. Any pending diagnostics and notifications were also reviewed, as well as any safety concerns or issues, abnormal labs, abnormal imaging, and abnormal assessment findings. Questions were answered.             [facility] [place]  05/11/20 [id number]

## 2020-05-12 NOTE — CARE COORDINATION
CASE MANAGEMENT NOTE:    Admission Date:  5/11/2020 Shayla Herring is a 62 y.o.  male    Admitted for : Bradycardia [R00.1]    Met with:  Patient    PCP:  Stated sees an NP at Dr Niharika Christine office                                Insurance:  Francisco Snowden      Current Residence/ Living Arrangements:  independently at home             Current Services PTA:  No    Is patient agreeable to VNS: No    Freedom of choice provided:  Yes    List of [address] provided: NA    VNS chosen:  NA    DME:  glucometer    Home Oxygen: No    Nebulizer: No    CPAP/BIPAP: No    Supplier: N/A    Potential Assistance Needed: Will follow    SNF needed: No    Freedom of choice and list provided: NA    Pharmacy:  [facility]       Does Patient want to use MEDS to BEDS? No    Is the Patient an BEBETO MOCTEZUMA [facility] with Readmission Risk Score greater than 14%? No  If yes, pt needs a follow up appointment made within 7 days. Family Members/Caregivers that pt would like involved in their care:    Yes    If yes, list name here:  Hang Rodrigues and [address] [place] [address],[address]    Transportation Provider:  Patient             Is patient in Isolation/One on One/Altered Mental Status? No  If yes, skip next question. If no, would they like an I-Pad to  use? No  If yes, call [phone]. Discharge Plan:  5/12/2020 [employer]; From home alone- independent and drives; DME- glucometer; Declines VNS; Cardiology consulted and stress test today//SHA                 Electronically signed by:  Donald Jacob RN on 5/12/2020 at 2:19 PM

## 2020-05-12 NOTE — DISCHARGE SUMMARY
[place] [address] Internal Medicine    Discharge Summary     Patient ID: Enedina Adams  :  1960   MRN: [id number]     ACCOUNT:  [de-identified]   Patient's PCP: No primary care provider on file. Admit Date: 2020   Discharge Date: 2020    Length of Stay: 0  Code Status:  Full Code  Admitting Physician: Yana Robb MD  Discharge Physician: Filipe Decker MD     Active Discharge Diagnoses:     Primary Problem  Bradycardia      [place] Problems    Diagnosis Date Noted    Bradycardia [R00.1] 2020    Type 1 diabetes mellitus with diabetic mononeuropathy ([facility] [place] [address] [E10.41] 2020       Admission Condition:  fair     Discharged Condition: fair    Hospital Stay:     Hospital Course:  Enedina Adams is a 62 y.o. male who was admitted for the management of Bradycardia , presented to ER with Shortness of Breath and Hypoglycemia (pt states low for him)  59-year-old gentleman with a history of uncontrolled diabetes mellitus and on high-dose of beta-blocker admitted with dyspnea and fatigue dyspnea was exertional feeling dizzy noted to have severe bradycardia heart rate in 30 condition diagnosed as overdose of beta-blocker beta-blockers taken off stress test was done no ischemia noted patient discharged off beta-blocker advised to follow-up in office may need a low-dose of beta-blocker patient was on atenolol 100 mg        Significant therapeutic interventions:     Significant Diagnostic Studies:   Labs / Micro:        ,     Radiology:    Ct Abdomen Pelvis Wo Contrast Additional Contrast? None    Result Date: 2020  EXAMINATION: CT OF THE ABDOMEN AND PELVIS WITHOUT CONTRAST 2020 7:36 pm TECHNIQUE: CT of the abdomen and pelvis was performed without the administration of intravenous contrast. Multiplanar reformatted images are provided for review.  Dose modulation, iterative reconstruction, and/or weight based adjustment of the mA/kV was utilized to reduce the radiation dose to as low as reasonably achievable. COMPARISON: None. HISTORY: ORDERING SYSTEM PROVIDED HISTORY: Pain TECHNOLOGIST PROVIDED HISTORY: Pain Reason for Exam: pt states he is always in pain, nausea and vomiting since this morning Acuity: Unknown Type of Exam: Unknown FINDINGS: Lower Chest: No acute findings. Organs: Suboptimal evaluation due to lack of IV contrast.  Liver demonstrates hepatic steatosis. Gallbladder, spleen, pancreas and adrenal glands all appear unremarkable. Kidneys demonstrate no evidence of stone or hydronephrosis. Abdominal aorta demonstrates mild calcification without aneurysm. GI/Bowel: Stomach is grossly unremarkable. Small bowel appears nondilated. No acute colonic abnormality. Pelvis: Urinary bladder and prostate gland all appear grossly unremarkable. Peritoneum/Retroperitoneum: No free air, free fluid or lymphadenopathy. Bones/Soft Tissues: Abdominal wall demonstrates no acute findings. Osseous structures demonstrate degenerative change. Hardware fixation presumed L4-L5 level. No acute intra-abdominal process. Xr Chest Portable    Result Date: 5/11/2020  EXAMINATION: ONE XRAY VIEW OF THE CHEST 5/11/2020 6:37 pm COMPARISON: None. HISTORY: ORDERING SYSTEM PROVIDED HISTORY: Chest Pain TECHNOLOGIST PROVIDED HISTORY: Chest Pain Reason for Exam: Chest pain Acuity: Acute Type of Exam: Initial FINDINGS: The lungs are without acute focal process. There is no effusion or pneumothorax. The cardiomediastinal silhouette is without acute process. The osseous structures are without acute process. No acute process. Nm Cardiac Stress Test Nuclear Imaging    Result Date: 5/12/2020  EXAMINATION: MYOCARDIAL PERFUSION IMAGING 5/12/2020 10:20 am TECHNIQUE: For the rest study, 39.4 mCi of Tc 99 labeled sestamibi were injected. SPECT images were acquired. Under cardiology supervision, 0.4mg [place] was infused.   After pharmacologic stress, 14 mCi of

## 2020-05-12 NOTE — PLAN OF CARE
Problem: Pain:  Goal: Pain level will decrease  Description: Pain level will decrease  5/12/2020 1555 by Cody Palafox RN  Outcome: Ongoing  Note: Pt medicated with pain medication prn. Assessed all pain characteristics including level, type, location, frequency, and onset. Non-pharmacologic interventions offered to pt as well. Pt states pain is tolerable at this time.  Will continue to monitor       Problem: Cardiac:  Goal: Ability to maintain vital signs within normal range will improve  Description: Ability to maintain vital signs within normal range will improve  5/12/2020 1555 by Cody Palafox RN  Outcome: Ongoing  Note: Vitals are stable

## 2020-05-12 NOTE — PROGRESS NOTES
LEXISCAN COMPLETED, PATIENT STATED HE HAD A FUNNY FEELING IN ABDOMINAL AREA, DENIES CHEST PAIN AND SHORTNESS OF BREATH. CAFFEINE GIVEN AFTER ONE MINUTE. /83, HR 71.

## 2020-05-12 NOTE — CONSULTS
100 UNIT/ML injection pen Inject into the skin 3 times daily (before meals) maximum 150 units/day 3/7/20  Yes Historical Provider, MD   TREHAIDER FLEXTOUCH 200 UNIT/ML SOPN Inject 80 Units into the skin daily  3/7/20  Yes Historical Provider, MD   quinapril (ACCUPRIL) 40 MG tablet Take 40 mg by mouth every morning (before breakfast)   Yes Historical Provider, MD   sertraline (ZOLOFT) 100 MG tablet Take 150 mg by mouth daily 1.5 tablets 3/9/20  Yes Historical Provider, MD   simvastatin (ZOCOR) 80 MG tablet Take 80 mg by mouth daily    Yes Historical Provider, MD   FREESTYLE LITE strip  3/2/20   Historical Provider, MD       Current Medications: Scheduled Meds:   morphine  4 mg Intravenous Once    gabapentin  600 mg Oral Nightly    sertraline  150 mg Oral Daily    simvastatin  80 mg Oral Daily    insulin lispro  0-12 Units Subcutaneous TID WC    insulin lispro  0-6 Units Subcutaneous Nightly    sodium chloride flush  10 mL Intravenous 2 times per day    enoxaparin  40 mg Subcutaneous Daily    insulin glargine  80 Units Subcutaneous Daily     Continuous Infusions:   sodium chloride 100 mL/hr at 05/12/20 0400    sodium chloride      dextrose       PRN Meds:.perflutren lipid microspheres, regadenoson, sodium chloride flush, sodium chloride, albuterol sulfate HFA, atropine, nitroGLYCERIN, metoprolol, aminophylline, glucose, dextrose, glucagon (rDNA), dextrose, sodium chloride flush, potassium chloride **OR** potassium alternative oral replacement **OR** potassium chloride, magnesium sulfate, acetaminophen **OR** acetaminophen, polyethylene glycol, promethazine **OR** ondansetron     Allergies:  Patient has no known allergies. Social History:   reports that he quit smoking about 35 years ago. He has a 3.50 pack-year smoking history. He has never used smokeless tobacco. He reports previous alcohol use. He reports that he does not use drugs.      Family History: family history includes Cancer in his father; Diabetes in his father, mother, and sister; Heart Attack in his mother and sister. Review of Systems   CONSTITUTIONAL:  positive for  fatigue and malaise    EYES:  negative for discharge    HEENT:  negative for epistaxis and sore throat    RESPIRATORY:  positive for  shortness of breath,no  wheezing    CARDIOVASCULAR:  negative for chest pain, palpitations, syncope, edema    GASTROINTESTINAL:  negative for nausea, vomiting, diarrhea, constipation, abdominal pain    GENITOURINARY:  negative for incontinence    MUSCULOSKELETAL:  negative for neck or back pain    NEUROLOGICAL:  negative for headaches, seizures and double vision   PSYCHIATRIC:  negative               PHYSICAL EXAM:    Blood pressure (!) 176/72, pulse 52, temperature 97.6 °F (36.4 °C), temperature source Oral, resp. rate 18, height 5' 10\" (1.778 m), weight 224 lb 13.9 oz (102 kg), SpO2 100 %. CONSTITUTIONAL: AOx4, no apparent distress, appears stated age   HEAD: normocephalic, atraumatic   EYES: PERRLA, EOMI   ENT: moist mucous membranes, uvula midline   NECK:  symmetric, no midline tenderness to palpation   LUNGS: clear to auscultation bilaterally   CARDIOVASCULAR: regular rate and rhythm, no murmurs, rubs or gallops   ABDOMEN: Soft, non-tender, non-distended with normal active bowel sounds   SKIN: no rash       DATA:    ECG: Normal sinus rhythm  Echo:  Stress: Getting  Cath:    Labs:     CBC:   Recent Labs     05/11/20  1755 05/12/20  0600   WBC 15.5* 10.9   HGB 16.7 15.4   HCT 49.1 45.5    138*     BMP:   Recent Labs     05/11/20  1755 05/12/20  0600    135   K 4.0 4.5   CO2 26 26   BUN 27* 34*   CREATININE 1.89* 1.48*   LABGLOM 36* 48*   GLUCOSE 105* 359*     BNP: No results for input(s): BNP in the last 72 hours. PT/INR:   Recent Labs     05/12/20  0600   PROTIME 13.3   INR 1.0     APTT:No results for input(s): APTT in the last 72 hours.   CARDIAC ENZYMES:No results for input(s): CKTOTAL, CKMB, CKMBINDEX, TROPONINI in the last 72

## 2020-05-12 NOTE — ED NOTES
COVID-19 orders initiated accidentally by Dr. Radha Alexis. Orders were immediately discontinued.       Allen Hess RN  05/11/20 1991

## 2020-05-13 ENCOUNTER — CARE COORDINATION (OUTPATIENT)
Dept: CASE MANAGEMENT | Age: 60
End: 2020-05-13

## 2020-05-19 ENCOUNTER — HOSPITAL ENCOUNTER (EMERGENCY)
Age: 60
Discharge: HOME | End: 2020-05-19
Attending: EMERGENCY MEDICINE
Payer: COMMERCIAL

## 2020-05-19 ENCOUNTER — APPOINTMENT (OUTPATIENT)
Dept: GENERAL RADIOLOGY | Age: 60
End: 2020-05-19
Payer: COMMERCIAL

## 2020-05-19 VITALS
SYSTOLIC BLOOD PRESSURE: 141 MMHG | BODY MASS INDEX: 31.5 KG/M2 | HEIGHT: 70 IN | WEIGHT: 220 LBS | OXYGEN SATURATION: 98 % | RESPIRATION RATE: 16 BRPM | TEMPERATURE: 98.1 F | HEART RATE: 105 BPM | DIASTOLIC BLOOD PRESSURE: 73 MMHG

## 2020-05-19 PROCEDURE — 99283 EMERGENCY DEPT VISIT LOW MDM: CPT

## 2020-05-19 PROCEDURE — 6370000000 HC RX 637 (ALT 250 FOR IP): Performed by: EMERGENCY MEDICINE

## 2020-05-19 PROCEDURE — 73130 X-RAY EXAM OF HAND: CPT

## 2020-05-19 RX ORDER — IBUPROFEN 800 MG/1
800 TABLET ORAL EVERY 8 HOURS PRN
Qty: 30 TABLET | Refills: 0 | Status: SHIPPED | OUTPATIENT
Start: 2020-05-19

## 2020-05-19 RX ORDER — IBUPROFEN 800 MG/1
800 TABLET ORAL ONCE
Status: COMPLETED | OUTPATIENT
Start: 2020-05-19 | End: 2020-05-19

## 2020-05-19 RX ADMIN — IBUPROFEN 800 MG: 800 TABLET ORAL at 16:15

## 2020-05-19 ASSESSMENT — ENCOUNTER SYMPTOMS
SORE THROAT: 0
ABDOMINAL PAIN: 0
SHORTNESS OF BREATH: 0
DIARRHEA: 0
VOMITING: 0
COLOR CHANGE: 0
CONSTIPATION: 0
BLOOD IN STOOL: 0
BACK PAIN: 0
NAUSEA: 0
COUGH: 0
TROUBLE SWALLOWING: 0

## 2020-05-19 ASSESSMENT — PAIN SCALES - GENERAL: PAINLEVEL_OUTOF10: 0

## 2020-05-19 NOTE — ED PROVIDER NOTES
history. He has never used smokeless tobacco. He reports previous alcohol use. He reports that he does not use drugs. PHYSICAL EXAM     INITIAL VITALS:  height is 5' 10\" (1.778 m) and weight is 220 lb (99.8 kg). His oral temperature is 98.1 °F (36.7 °C). His blood pressure is 141/73 (abnormal) and his pulse is 105. His respiration is 16 and oxygen saturation is 98%. Physical Exam  Vitals signs and nursing note reviewed. Constitutional:       General: He is not in acute distress. HENT:      Head: Normocephalic and atraumatic. Eyes:      Conjunctiva/sclera: Conjunctivae normal.      Pupils: Pupils are equal, round, and reactive to light. Neck:      Musculoskeletal: Neck supple. Cardiovascular:      Rate and Rhythm: Normal rate and regular rhythm. Pulmonary:      Effort: Pulmonary effort is normal. No respiratory distress. Abdominal:      General: There is no distension. Musculoskeletal:      Right hand: He exhibits tenderness and swelling. He exhibits no deformity. Hands:    Skin:     General: Skin is warm and dry. Findings: Bruising (Dorsum right hand) present. No rash. Neurological:      Mental Status: He is alert and oriented to person, place, and time. Psychiatric:         Judgment: Judgment normal.           DIFFERENTIAL DIAGNOSIS/MDM:   38-year-old male presents with swelling, bruising and tenderness to the dorsum of his right hand after a large branch fell onto it today. He is afebrile, nontoxic, normal vital signs. No acute distress. Is no obvious deformity. Will get x-ray, treat pain. DIAGNOSTIC RESULTS     EKG: All EKG's are interpreted by the Emergency Department Physician who either signs or Co-signs this chart in the absence of a cardiologist.        RADIOLOGY:   I directly visualized the following  images and reviewed the radiologist interpretations:  XR HAND RIGHT (MIN 3 VIEWS)   Final Result   No acute finding.                  ED BEDSIDE

## 2020-05-19 NOTE — ED TRIAGE NOTES
Mode of arrival (squad #, walk in, police, etc) : walk in        Chief complaint(s): hand injury        Arrival Note (brief scenario, treatment PTA, etc). : Pt states he was on a step ladder  Feet high and was cutting down a tree branch. The tree branch fell on his hand and he fell off the ladder, landed on his side onto grass. . Denies hitting head or LOC. Pt is A&Ox4, respirations even and unlabored, in no acute distress, ambulatory. C= \"Have you ever felt that you should Cut down on your drinking? \"  No  A= \"Have people Annoyed you by criticizing your drinking? \"  No  G= \"Have you ever felt bad or Guilty about your drinking? \"  No  E= \"Have you ever had a drink as an Eye-opener first thing in the morning to steady your nerves or to help a hangover? \"  No      Deferred []      Reason for deferring: N/A    *If yes to two or more: probable alcohol abuse. *

## 2020-05-20 ENCOUNTER — CARE COORDINATION (OUTPATIENT)
Dept: CASE MANAGEMENT | Age: 60
End: 2020-05-20

## 2020-06-10 NOTE — TELEPHONE ENCOUNTER
Patient called 6/10/2020 @ 12:53 and states he wants to cancel his colonoscopy for 6/25/2020 with Dr Bruce Cushing and doesn't want to reschedule at this time . Please cancel procedure.  Thank You

## 2022-06-23 ENCOUNTER — TELEPHONE (OUTPATIENT)
Dept: INTERNAL MEDICINE CLINIC | Age: 62
End: 2022-06-23

## 2022-06-23 NOTE — TELEPHONE ENCOUNTER
----- Message from Tong Shannan sent at 6/23/2022 11:20 AM EDT -----  Subject: Results Request    QUESTIONS  Which lab or imaging result is the patient calling about? a1c  Which provider ordered the test?   At what location was the test performed? Date the test was performed? Additional Information for Provider? Patient is calling about the results   of his A1C test.  ---------------------------------------------------------------------------  --------------  CALL BACK INFO  What is the best way for the office to contact you? OK to leave message on   voicemail  Preferred Call Back Phone Number? [phone]  ---------------------------------------------------------------------------  --------------  SCRIPT ANSWERS  Relationship to Patient?  Self

## 2022-06-23 NOTE — TELEPHONE ENCOUNTER
Called and spoke with patient and informed him that since he is not a patient in this office I can not review labs with him and that he will need to call the physicans office that ordered the test

## 2024-06-21 ENCOUNTER — APPOINTMENT (OUTPATIENT)
Dept: VASCULAR LAB | Age: 64
DRG: 870 | End: 2024-06-21
Attending: INTERNAL MEDICINE
Payer: MEDICARE

## 2024-06-21 ENCOUNTER — APPOINTMENT (OUTPATIENT)
Dept: GENERAL RADIOLOGY | Age: 64
DRG: 870 | End: 2024-06-21
Payer: MEDICARE

## 2024-06-21 ENCOUNTER — HOSPITAL ENCOUNTER (INPATIENT)
Age: 64
LOS: 10 days | Discharge: HOSPICE | DRG: 870 | End: 2024-07-01
Attending: EMERGENCY MEDICINE | Admitting: INTERNAL MEDICINE
Payer: MEDICARE

## 2024-06-21 ENCOUNTER — APPOINTMENT (OUTPATIENT)
Dept: CT IMAGING | Age: 64
DRG: 870 | End: 2024-06-21
Payer: MEDICARE

## 2024-06-21 ENCOUNTER — APPOINTMENT (OUTPATIENT)
Dept: MRI IMAGING | Age: 64
DRG: 870 | End: 2024-06-21
Payer: MEDICARE

## 2024-06-21 DIAGNOSIS — N17.9 ACUTE RENAL FAILURE, UNSPECIFIED ACUTE RENAL FAILURE TYPE (HCC): ICD-10-CM

## 2024-06-21 DIAGNOSIS — T14.8XXA WOUND INFECTION: ICD-10-CM

## 2024-06-21 DIAGNOSIS — L08.9 WOUND INFECTION: ICD-10-CM

## 2024-06-21 DIAGNOSIS — R07.9 CHEST PAIN, UNSPECIFIED TYPE: ICD-10-CM

## 2024-06-21 DIAGNOSIS — A41.9 SEVERE SEPSIS (HCC): ICD-10-CM

## 2024-06-21 DIAGNOSIS — D69.6 THROMBOCYTOPENIA (HCC): ICD-10-CM

## 2024-06-21 DIAGNOSIS — R65.20 SEVERE SEPSIS (HCC): ICD-10-CM

## 2024-06-21 DIAGNOSIS — R60.0 LEG EDEMA: ICD-10-CM

## 2024-06-21 DIAGNOSIS — I70.245 ATHEROSCLEROSIS OF NATIVE ARTERIES OF LEFT LEG WITH ULCERATION OF OTHER PART OF FOOT (HCC): ICD-10-CM

## 2024-06-21 DIAGNOSIS — T79.6XXA TRAUMATIC RHABDOMYOLYSIS, INITIAL ENCOUNTER (HCC): Primary | ICD-10-CM

## 2024-06-21 PROBLEM — G93.40 ACUTE ENCEPHALOPATHY: Status: ACTIVE | Noted: 2024-06-21

## 2024-06-21 PROBLEM — M86.9 OSTEOMYELITIS (HCC): Status: ACTIVE | Noted: 2024-06-21

## 2024-06-21 LAB
ABSOLUTE BANDS: 4.82 K/UL (ref 0–1)
ALBUMIN SERPL-MCNC: 2.5 G/DL (ref 3.5–5.2)
ALP SERPL-CCNC: 77 U/L (ref 40–129)
ALT SERPL-CCNC: 41 U/L (ref 5–41)
AMPHET UR QL SCN: NEGATIVE
ANION GAP SERPL CALCULATED.3IONS-SCNC: 21 MMOL/L (ref 9–17)
ARTERIAL PATENCY WRIST A: ABNORMAL
AST SERPL-CCNC: 95 U/L
B-OH-BUTYR SERPL-MCNC: 0.39 MMOL/L (ref 0.02–0.27)
BACTERIA URNS QL MICRO: ABNORMAL
BANDS: 22 % (ref 0–10)
BARBITURATES UR QL SCN: NEGATIVE
BASOPHILS # BLD: 0 K/UL (ref 0–0.2)
BASOPHILS NFR BLD: 0 % (ref 0–2)
BDY SITE: ABNORMAL
BENZODIAZ UR QL: NEGATIVE
BILIRUB SERPL-MCNC: 1.2 MG/DL (ref 0.3–1.2)
BILIRUB UR QL STRIP: ABNORMAL
BODY TEMPERATURE: 37
BODY TEMPERATURE: 39
BUN SERPL-MCNC: 78 MG/DL (ref 8–23)
CALCIUM SERPL-MCNC: 9.1 MG/DL (ref 8.6–10.4)
CANNABINOIDS UR QL SCN: NEGATIVE
CASTS #/AREA URNS LPF: ABNORMAL /LPF
CHLORIDE SERPL-SCNC: 95 MMOL/L (ref 98–107)
CK SERPL-CCNC: 3375 U/L (ref 39–308)
CK SERPL-CCNC: 3392 U/L (ref 39–308)
CLARITY UR: ABNORMAL
CO2 SERPL-SCNC: 20 MMOL/L (ref 20–31)
COCAINE UR QL SCN: NEGATIVE
COHGB MFR BLD: 1.5 % (ref 0–5)
COHGB MFR BLD: 2.9 %
COLOR UR: ABNORMAL
COMMENT: ABNORMAL
CREAT SERPL-MCNC: 2.8 MG/DL (ref 0.7–1.2)
CREAT SERPL-MCNC: 3.4 MG/DL (ref 0.7–1.2)
CREAT UR-MCNC: 289.6 MG/DL (ref 39–259)
CRP SERPL HS-MCNC: 483.3 MG/L (ref 0–5)
EOSINOPHIL # BLD: 0 K/UL (ref 0–0.4)
EOSINOPHIL,URINE: NORMAL
EOSINOPHILS RELATIVE PERCENT: 0 % (ref 0–4)
EPI CELLS #/AREA URNS HPF: ABNORMAL /HPF
ERYTHROCYTE [DISTWIDTH] IN BLOOD BY AUTOMATED COUNT: 16 % (ref 11.5–14.9)
ERYTHROCYTE [SEDIMENTATION RATE] IN BLOOD BY PHOTOMETRIC METHOD: 95 MM/HR (ref 0–20)
FENTANYL UR QL: NEGATIVE
FIO2 ON VENT: 100 %
GAS FLOW.O2 O2 DELIVERY SYS: ABNORMAL L/MIN
GAS FLOW.O2 O2 DELIVERY SYS: ABNORMAL L/MIN
GAS FLOW.O2 SETTING OXYMISER: 20 L/MIN
GFR, ESTIMATED: 19 ML/MIN/1.73M2
GFR, ESTIMATED: 24 ML/MIN/1.73M2
GLUCOSE BLD-MCNC: 110 MG/DL (ref 75–110)
GLUCOSE BLD-MCNC: 21 MG/DL (ref 75–110)
GLUCOSE BLD-MCNC: 79 MG/DL (ref 75–110)
GLUCOSE BLD-MCNC: 97 MG/DL (ref 75–110)
GLUCOSE BLD-MCNC: 99 MG/DL (ref 75–110)
GLUCOSE SERPL-MCNC: 153 MG/DL (ref 70–99)
GLUCOSE UR STRIP-MCNC: ABNORMAL MG/DL
HCO3 ARTERIAL: 21.1 MMOL/L (ref 22–26)
HCO3 ARTERIAL: 21.4 MMOL/L (ref 22–26)
HCO3 VENOUS: 22.3 MMOL/L (ref 24–30)
HCT VFR BLD AUTO: 46.1 % (ref 41–53)
HGB BLD-MCNC: 15.1 G/DL (ref 13.5–17.5)
HGB UR QL STRIP.AUTO: ABNORMAL
INR PPP: 1.3
KETONES UR STRIP-MCNC: NEGATIVE MG/DL
LACTATE BLDV-SCNC: 2.6 MMOL/L (ref 0.5–1.9)
LACTATE BLDV-SCNC: 3.4 MMOL/L (ref 0.5–1.9)
LEUKOCYTE ESTERASE UR QL STRIP: ABNORMAL
LIPASE SERPL-CCNC: 5 U/L (ref 13–60)
LYMPHOCYTES NFR BLD: 0.88 K/UL (ref 1–4.8)
LYMPHOCYTES RELATIVE PERCENT: 4 % (ref 24–44)
MAGNESIUM SERPL-MCNC: 2.2 MG/DL (ref 1.6–2.6)
MCH RBC QN AUTO: 27.8 PG (ref 26–34)
MCHC RBC AUTO-ENTMCNC: 32.7 G/DL (ref 31–37)
MCV RBC AUTO: 85 FL (ref 80–100)
METAMYELOCYTES ABSOLUTE COUNT: 0.22 K/UL
METAMYELOCYTES: 1 %
METHADONE UR QL: NEGATIVE
METHEMOGLOBIN: 0.5 %
METHEMOGLOBIN: 1.1 % (ref 0–1.9)
METHEMOGLOBIN: 1.3 % (ref 0–1.9)
MONOCYTES NFR BLD: 1.1 K/UL (ref 0.1–1.3)
MONOCYTES NFR BLD: 5 % (ref 1–7)
MORPHOLOGY: ABNORMAL
NEGATIVE BASE EXCESS, ART: 3.4 MMOL/L (ref 0–2)
NEGATIVE BASE EXCESS, ART: 6.3 MMOL/L (ref 0–2)
NEGATIVE BASE EXCESS, VEN: 2.6 MMOL/L (ref 0–2)
NEUTROPHILS NFR BLD: 68 % (ref 36–66)
NEUTS SEG NFR BLD: 14.88 K/UL (ref 1.3–9.1)
NITRITE UR QL STRIP: POSITIVE
O2 SAT, ARTERIAL: 92.1 % (ref 95–98)
O2 SAT, ARTERIAL: 97.6 % (ref 95–98)
O2 SAT, VEN: 66.7 %
OPIATES UR QL SCN: NEGATIVE
OXYCODONE UR QL SCN: NEGATIVE
PARTIAL THROMBOPLASTIN TIME: 30.5 SEC (ref 24–36)
PCO2 ARTERIAL: 32.5 MMHG (ref 35–45)
PCO2 ARTERIAL: 51.6 MMHG (ref 35–45)
PCO2 VENOUS: 36.5 MM HG (ref 39–55)
PCO2, ART, TEMP ADJ: 35.5 (ref 35–45)
PCP UR QL SCN: NEGATIVE
PEEP RESPIRATORY: 8 CM[H2O]
PH ARTERIAL: 7.22 (ref 7.35–7.45)
PH ARTERIAL: 7.42 (ref 7.35–7.45)
PH UR STRIP: 5.5 [PH] (ref 5–8)
PH VENOUS: 7.39 (ref 7.32–7.42)
PH, ART, TEMP ADJ: 7.39 (ref 7.35–7.45)
PLATELET # BLD AUTO: 155 K/UL (ref 150–450)
PMV BLD AUTO: 9.1 FL (ref 6–12)
PO2 ARTERIAL: 351 MMHG (ref 80–100)
PO2 ARTERIAL: 69.4 MMHG (ref 80–100)
PO2 VENOUS: 35.8 MM HG (ref 30–50)
PO2, ART, TEMP ADJ: 79.1 MMHG (ref 80–100)
POTASSIUM SERPL-SCNC: 4.5 MMOL/L (ref 3.7–5.3)
PROCALCITONIN SERPL-MCNC: 65.82 NG/ML
PROT SERPL-MCNC: 7.7 G/DL (ref 6.4–8.3)
PROT UR STRIP-MCNC: ABNORMAL MG/DL
PROTHROMBIN TIME: 16.3 SEC (ref 11.8–14.6)
PT. POSITION: ABNORMAL
PT. POSITION: ABNORMAL
RBC # BLD AUTO: 5.42 M/UL (ref 4.5–5.9)
RBC #/AREA URNS HPF: ABNORMAL /HPF
RESPIRATORY RATE: 20
RESPIRATORY RATE: 38
SODIUM SERPL-SCNC: 136 MMOL/L (ref 135–144)
SODIUM UR-SCNC: 25 MMOL/L
SP GR UR STRIP: 1.04 (ref 1–1.03)
TEST INFORMATION: NORMAL
TEXT FOR RESPIRATORY: ABNORMAL
TOTAL PROTEIN, URINE: 2477 MG/DL
TOTAL RATE: 23
URINE TOTAL PROTEIN CREATININE RATIO: 8.55 (ref 0–0.2)
UROBILINOGEN UR STRIP-ACNC: NORMAL EU/DL (ref 0–1)
UUN UR-MCNC: 692 MG/DL
VENTILATION MODE VENT: ABNORMAL
VT: 500
WBC #/AREA URNS HPF: ABNORMAL /HPF
WBC OTHER # BLD: 21.9 K/UL (ref 3.5–11)

## 2024-06-21 PROCEDURE — 87070 CULTURE OTHR SPECIMN AEROBIC: CPT

## 2024-06-21 PROCEDURE — 86403 PARTICLE AGGLUT ANTBDY SCRN: CPT

## 2024-06-21 PROCEDURE — 82565 ASSAY OF CREATININE: CPT

## 2024-06-21 PROCEDURE — 2500000003 HC RX 250 WO HCPCS

## 2024-06-21 PROCEDURE — 6360000002 HC RX W HCPCS: Performed by: INTERNAL MEDICINE

## 2024-06-21 PROCEDURE — 73630 X-RAY EXAM OF FOOT: CPT

## 2024-06-21 PROCEDURE — 82805 BLOOD GASES W/O2 SATURATION: CPT

## 2024-06-21 PROCEDURE — 83690 ASSAY OF LIPASE: CPT

## 2024-06-21 PROCEDURE — 83605 ASSAY OF LACTIC ACID: CPT

## 2024-06-21 PROCEDURE — 80053 COMPREHEN METABOLIC PANEL: CPT

## 2024-06-21 PROCEDURE — 2000000000 HC ICU R&B

## 2024-06-21 PROCEDURE — 2500000003 HC RX 250 WO HCPCS: Performed by: EMERGENCY MEDICINE

## 2024-06-21 PROCEDURE — 84145 PROCALCITONIN (PCT): CPT

## 2024-06-21 PROCEDURE — 85025 COMPLETE CBC W/AUTO DIFF WBC: CPT

## 2024-06-21 PROCEDURE — 6360000002 HC RX W HCPCS: Performed by: EMERGENCY MEDICINE

## 2024-06-21 PROCEDURE — 87086 URINE CULTURE/COLONY COUNT: CPT

## 2024-06-21 PROCEDURE — 87184 SC STD DISK METHOD PER PLATE: CPT

## 2024-06-21 PROCEDURE — 73700 CT LOWER EXTREMITY W/O DYE: CPT

## 2024-06-21 PROCEDURE — 99285 EMERGENCY DEPT VISIT HI MDM: CPT

## 2024-06-21 PROCEDURE — 87075 CULTR BACTERIA EXCEPT BLOOD: CPT

## 2024-06-21 PROCEDURE — 84300 ASSAY OF URINE SODIUM: CPT

## 2024-06-21 PROCEDURE — 6370000000 HC RX 637 (ALT 250 FOR IP): Performed by: EMERGENCY MEDICINE

## 2024-06-21 PROCEDURE — 84156 ASSAY OF PROTEIN URINE: CPT

## 2024-06-21 PROCEDURE — 2580000003 HC RX 258: Performed by: EMERGENCY MEDICINE

## 2024-06-21 PROCEDURE — 94002 VENT MGMT INPAT INIT DAY: CPT

## 2024-06-21 PROCEDURE — 87077 CULTURE AEROBIC IDENTIFY: CPT

## 2024-06-21 PROCEDURE — 82800 BLOOD PH: CPT

## 2024-06-21 PROCEDURE — 86140 C-REACTIVE PROTEIN: CPT

## 2024-06-21 PROCEDURE — 6360000002 HC RX W HCPCS

## 2024-06-21 PROCEDURE — 85652 RBC SED RATE AUTOMATED: CPT

## 2024-06-21 PROCEDURE — 82570 ASSAY OF URINE CREATININE: CPT

## 2024-06-21 PROCEDURE — 99291 CRITICAL CARE FIRST HOUR: CPT | Performed by: INTERNAL MEDICINE

## 2024-06-21 PROCEDURE — 85730 THROMBOPLASTIN TIME PARTIAL: CPT

## 2024-06-21 PROCEDURE — 82550 ASSAY OF CK (CPK): CPT

## 2024-06-21 PROCEDURE — 82010 KETONE BODYS QUAN: CPT

## 2024-06-21 PROCEDURE — 36600 WITHDRAWAL OF ARTERIAL BLOOD: CPT

## 2024-06-21 PROCEDURE — 87040 BLOOD CULTURE FOR BACTERIA: CPT

## 2024-06-21 PROCEDURE — 93005 ELECTROCARDIOGRAM TRACING: CPT

## 2024-06-21 PROCEDURE — 70450 CT HEAD/BRAIN W/O DYE: CPT

## 2024-06-21 PROCEDURE — 96365 THER/PROPH/DIAG IV INF INIT: CPT

## 2024-06-21 PROCEDURE — 6370000000 HC RX 637 (ALT 250 FOR IP)

## 2024-06-21 PROCEDURE — 99223 1ST HOSP IP/OBS HIGH 75: CPT | Performed by: PSYCHIATRY & NEUROLOGY

## 2024-06-21 PROCEDURE — 71045 X-RAY EXAM CHEST 1 VIEW: CPT

## 2024-06-21 PROCEDURE — 73718 MRI LOWER EXTREMITY W/O DYE: CPT

## 2024-06-21 PROCEDURE — 80307 DRUG TEST PRSMV CHEM ANLYZR: CPT

## 2024-06-21 PROCEDURE — 82947 ASSAY GLUCOSE BLOOD QUANT: CPT

## 2024-06-21 PROCEDURE — 81001 URINALYSIS AUTO W/SCOPE: CPT

## 2024-06-21 PROCEDURE — 87186 SC STD MICRODIL/AGAR DIL: CPT

## 2024-06-21 PROCEDURE — 36415 COLL VENOUS BLD VENIPUNCTURE: CPT

## 2024-06-21 PROCEDURE — 87154 CUL TYP ID BLD PTHGN 6+ TRGT: CPT

## 2024-06-21 PROCEDURE — 87205 SMEAR GRAM STAIN: CPT

## 2024-06-21 PROCEDURE — 2580000003 HC RX 258

## 2024-06-21 PROCEDURE — 83735 ASSAY OF MAGNESIUM: CPT

## 2024-06-21 PROCEDURE — 93971 EXTREMITY STUDY: CPT

## 2024-06-21 PROCEDURE — 99222 1ST HOSP IP/OBS MODERATE 55: CPT | Performed by: PODIATRIST

## 2024-06-21 PROCEDURE — 96375 TX/PRO/DX INJ NEW DRUG ADDON: CPT

## 2024-06-21 PROCEDURE — 85610 PROTHROMBIN TIME: CPT

## 2024-06-21 PROCEDURE — 84540 ASSAY OF URINE/UREA-N: CPT

## 2024-06-21 RX ORDER — SODIUM CHLORIDE 0.9 % (FLUSH) 0.9 %
5-40 SYRINGE (ML) INJECTION PRN
Status: DISCONTINUED | OUTPATIENT
Start: 2024-06-21 | End: 2024-07-01 | Stop reason: HOSPADM

## 2024-06-21 RX ORDER — ATORVASTATIN CALCIUM 40 MG/1
40 TABLET, FILM COATED ORAL DAILY
Status: DISCONTINUED | OUTPATIENT
Start: 2024-06-21 | End: 2024-07-01

## 2024-06-21 RX ORDER — DEXTROSE MONOHYDRATE AND SODIUM CHLORIDE 5; .9 G/100ML; G/100ML
INJECTION, SOLUTION INTRAVENOUS CONTINUOUS
Status: DISCONTINUED | OUTPATIENT
Start: 2024-06-21 | End: 2024-06-22

## 2024-06-21 RX ORDER — PREGABALIN 75 MG/1
150 CAPSULE ORAL DAILY
Status: DISCONTINUED | OUTPATIENT
Start: 2024-06-21 | End: 2024-07-01

## 2024-06-21 RX ORDER — AMLODIPINE BESYLATE 5 MG/1
5 TABLET ORAL DAILY
Status: DISCONTINUED | OUTPATIENT
Start: 2024-06-21 | End: 2024-06-27

## 2024-06-21 RX ORDER — ACETAMINOPHEN 325 MG/1
650 TABLET ORAL EVERY 6 HOURS PRN
Status: DISCONTINUED | OUTPATIENT
Start: 2024-06-21 | End: 2024-07-01 | Stop reason: HOSPADM

## 2024-06-21 RX ORDER — MAGNESIUM SULFATE HEPTAHYDRATE 40 MG/ML
2000 INJECTION, SOLUTION INTRAVENOUS PRN
Status: DISCONTINUED | OUTPATIENT
Start: 2024-06-21 | End: 2024-06-22

## 2024-06-21 RX ORDER — HYDRALAZINE HYDROCHLORIDE 100 MG/1
100 TABLET, FILM COATED ORAL 3 TIMES DAILY
COMMUNITY

## 2024-06-21 RX ORDER — PROPOFOL 10 MG/ML
INJECTION, EMULSION INTRAVENOUS
Status: COMPLETED
Start: 2024-06-21 | End: 2024-06-21

## 2024-06-21 RX ORDER — DEXTROSE MONOHYDRATE 50 MG/ML
INJECTION, SOLUTION INTRAVENOUS CONTINUOUS
Status: DISCONTINUED | OUTPATIENT
Start: 2024-06-21 | End: 2024-06-21

## 2024-06-21 RX ORDER — LORAZEPAM 2 MG/ML
INJECTION INTRAMUSCULAR
Status: COMPLETED
Start: 2024-06-21 | End: 2024-06-21

## 2024-06-21 RX ORDER — DEXTROSE MONOHYDRATE 100 MG/ML
INJECTION, SOLUTION INTRAVENOUS CONTINUOUS PRN
Status: DISCONTINUED | OUTPATIENT
Start: 2024-06-21 | End: 2024-07-01 | Stop reason: HOSPADM

## 2024-06-21 RX ORDER — SODIUM CHLORIDE 9 MG/ML
INJECTION, SOLUTION INTRAVENOUS PRN
Status: DISCONTINUED | OUTPATIENT
Start: 2024-06-21 | End: 2024-07-01 | Stop reason: HOSPADM

## 2024-06-21 RX ORDER — POTASSIUM CHLORIDE 20 MEQ/1
40 TABLET, EXTENDED RELEASE ORAL PRN
Status: DISCONTINUED | OUTPATIENT
Start: 2024-06-21 | End: 2024-06-22

## 2024-06-21 RX ORDER — 0.9 % SODIUM CHLORIDE 0.9 %
500 INTRAVENOUS SOLUTION INTRAVENOUS ONCE
Status: DISCONTINUED | OUTPATIENT
Start: 2024-06-21 | End: 2024-07-01

## 2024-06-21 RX ORDER — LORAZEPAM 2 MG/ML
1 INJECTION INTRAMUSCULAR ONCE
Status: COMPLETED | OUTPATIENT
Start: 2024-06-21 | End: 2024-06-21

## 2024-06-21 RX ORDER — SODIUM CHLORIDE 0.9 % (FLUSH) 0.9 %
5-40 SYRINGE (ML) INJECTION EVERY 12 HOURS SCHEDULED
Status: DISCONTINUED | OUTPATIENT
Start: 2024-06-21 | End: 2024-07-01

## 2024-06-21 RX ORDER — OXYCODONE HYDROCHLORIDE AND ACETAMINOPHEN 5; 325 MG/1; MG/1
1 TABLET ORAL
COMMUNITY

## 2024-06-21 RX ORDER — HEPARIN SODIUM 5000 [USP'U]/ML
5000 INJECTION, SOLUTION INTRAVENOUS; SUBCUTANEOUS EVERY 8 HOURS SCHEDULED
Status: DISCONTINUED | OUTPATIENT
Start: 2024-06-21 | End: 2024-06-28

## 2024-06-21 RX ORDER — 0.9 % SODIUM CHLORIDE 0.9 %
30 INTRAVENOUS SOLUTION INTRAVENOUS ONCE
Status: COMPLETED | OUTPATIENT
Start: 2024-06-21 | End: 2024-06-21

## 2024-06-21 RX ORDER — MIDAZOLAM HYDROCHLORIDE 1 MG/ML
INJECTION INTRAMUSCULAR; INTRAVENOUS
Status: COMPLETED | OUTPATIENT
Start: 2024-06-21 | End: 2024-06-21

## 2024-06-21 RX ORDER — METOPROLOL TARTRATE 1 MG/ML
5 INJECTION, SOLUTION INTRAVENOUS AS NEEDED
Status: DISCONTINUED | OUTPATIENT
Start: 2024-06-21 | End: 2024-06-25

## 2024-06-21 RX ORDER — PANTOPRAZOLE SODIUM 40 MG/1
40 TABLET, DELAYED RELEASE ORAL 2 TIMES DAILY
COMMUNITY

## 2024-06-21 RX ORDER — ACETAMINOPHEN 650 MG/1
650 SUPPOSITORY RECTAL EVERY 6 HOURS PRN
Status: DISCONTINUED | OUTPATIENT
Start: 2024-06-21 | End: 2024-07-01 | Stop reason: HOSPADM

## 2024-06-21 RX ORDER — INSULIN LISPRO 100 [IU]/ML
0-4 INJECTION, SOLUTION INTRAVENOUS; SUBCUTANEOUS EVERY 4 HOURS
Status: DISCONTINUED | OUTPATIENT
Start: 2024-06-21 | End: 2024-06-26

## 2024-06-21 RX ORDER — PREGABALIN 150 MG/1
150 CAPSULE ORAL DAILY
COMMUNITY

## 2024-06-21 RX ORDER — SODIUM CHLORIDE 9 MG/ML
INJECTION, SOLUTION INTRAVENOUS CONTINUOUS
Status: DISCONTINUED | OUTPATIENT
Start: 2024-06-21 | End: 2024-06-21

## 2024-06-21 RX ORDER — AMLODIPINE BESYLATE 5 MG/1
5 TABLET ORAL DAILY
COMMUNITY

## 2024-06-21 RX ORDER — LORAZEPAM 2 MG/ML
1 INJECTION INTRAMUSCULAR ONCE
Status: DISCONTINUED | OUTPATIENT
Start: 2024-06-21 | End: 2024-06-21 | Stop reason: SDUPTHER

## 2024-06-21 RX ORDER — ROCURONIUM BROMIDE 10 MG/ML
INJECTION, SOLUTION INTRAVENOUS
Status: COMPLETED | OUTPATIENT
Start: 2024-06-21 | End: 2024-06-21

## 2024-06-21 RX ORDER — POTASSIUM CHLORIDE 7.45 MG/ML
10 INJECTION INTRAVENOUS PRN
Status: DISCONTINUED | OUTPATIENT
Start: 2024-06-21 | End: 2024-06-22

## 2024-06-21 RX ORDER — ACETAMINOPHEN 650 MG/1
650 SUPPOSITORY RECTAL ONCE
Status: COMPLETED | OUTPATIENT
Start: 2024-06-21 | End: 2024-06-21

## 2024-06-21 RX ORDER — MAGNESIUM SULFATE HEPTAHYDRATE 40 MG/ML
2000 INJECTION, SOLUTION INTRAVENOUS ONCE
Status: COMPLETED | OUTPATIENT
Start: 2024-06-21 | End: 2024-06-21

## 2024-06-21 RX ORDER — LORAZEPAM 2 MG/ML
1 INJECTION INTRAMUSCULAR ONCE
Status: DISCONTINUED | OUTPATIENT
Start: 2024-06-21 | End: 2024-06-21

## 2024-06-21 RX ORDER — FENTANYL CITRATE 0.05 MG/ML
50 INJECTION, SOLUTION INTRAMUSCULAR; INTRAVENOUS
Status: DISCONTINUED | OUTPATIENT
Start: 2024-06-21 | End: 2024-06-24

## 2024-06-21 RX ORDER — ONDANSETRON 4 MG/1
4 TABLET, ORALLY DISINTEGRATING ORAL EVERY 8 HOURS PRN
Status: DISCONTINUED | OUTPATIENT
Start: 2024-06-21 | End: 2024-07-01 | Stop reason: HOSPADM

## 2024-06-21 RX ORDER — POLYETHYLENE GLYCOL 3350 17 G/17G
17 POWDER, FOR SOLUTION ORAL DAILY PRN
Status: DISCONTINUED | OUTPATIENT
Start: 2024-06-21 | End: 2024-07-01

## 2024-06-21 RX ORDER — PROPOFOL 10 MG/ML
5-50 INJECTION, EMULSION INTRAVENOUS CONTINUOUS
Status: DISCONTINUED | OUTPATIENT
Start: 2024-06-21 | End: 2024-06-24

## 2024-06-21 RX ORDER — HYDRALAZINE HYDROCHLORIDE 50 MG/1
100 TABLET, FILM COATED ORAL 3 TIMES DAILY
Status: DISCONTINUED | OUTPATIENT
Start: 2024-06-21 | End: 2024-07-01

## 2024-06-21 RX ORDER — ONDANSETRON 2 MG/ML
4 INJECTION INTRAMUSCULAR; INTRAVENOUS EVERY 6 HOURS PRN
Status: DISCONTINUED | OUTPATIENT
Start: 2024-06-21 | End: 2024-07-01 | Stop reason: HOSPADM

## 2024-06-21 RX ADMIN — SODIUM CHLORIDE 2994 ML: 9 INJECTION, SOLUTION INTRAVENOUS at 11:11

## 2024-06-21 RX ADMIN — MIDAZOLAM 4 MG: 1 INJECTION INTRAMUSCULAR; INTRAVENOUS at 17:42

## 2024-06-21 RX ADMIN — ACETAMINOPHEN 650 MG: 650 SUPPOSITORY RECTAL at 12:07

## 2024-06-21 RX ADMIN — LORAZEPAM 1 MG: 2 INJECTION INTRAMUSCULAR at 17:25

## 2024-06-21 RX ADMIN — PROPOFOL 10 MCG/KG/MIN: 10 INJECTION, EMULSION INTRAVENOUS at 18:16

## 2024-06-21 RX ADMIN — DEXTROSE AND SODIUM CHLORIDE: 5; 900 INJECTION, SOLUTION INTRAVENOUS at 18:33

## 2024-06-21 RX ADMIN — ACETAMINOPHEN 650 MG: 650 SUPPOSITORY RECTAL at 19:31

## 2024-06-21 RX ADMIN — HEPARIN SODIUM 5000 UNITS: 5000 INJECTION INTRAVENOUS; SUBCUTANEOUS at 23:00

## 2024-06-21 RX ADMIN — MAGNESIUM SULFATE HEPTAHYDRATE 2000 MG: 40 INJECTION, SOLUTION INTRAVENOUS at 18:21

## 2024-06-21 RX ADMIN — LORAZEPAM 1 MG: 2 INJECTION INTRAMUSCULAR; INTRAVENOUS at 17:25

## 2024-06-21 RX ADMIN — LORAZEPAM 1 MG: 2 INJECTION INTRAMUSCULAR; INTRAVENOUS at 17:29

## 2024-06-21 RX ADMIN — VANCOMYCIN HYDROCHLORIDE 2500 MG: 1.5 INJECTION, POWDER, LYOPHILIZED, FOR SOLUTION INTRAVENOUS at 13:09

## 2024-06-21 RX ADMIN — CEFEPIME 2000 MG: 2 INJECTION, POWDER, FOR SOLUTION INTRAVENOUS at 11:41

## 2024-06-21 RX ADMIN — SODIUM CHLORIDE: 9 INJECTION, SOLUTION INTRAVENOUS at 13:32

## 2024-06-21 RX ADMIN — ROCURONIUM BROMIDE 75 MG: 10 INJECTION, SOLUTION INTRAVENOUS at 17:43

## 2024-06-21 ASSESSMENT — PULMONARY FUNCTION TESTS
PIF_VALUE: 25
PIF_VALUE: 26
PIF_VALUE: 25
PIF_VALUE: 25
PIF_VALUE: 26
PIF_VALUE: 25
PIF_VALUE: 26
PIF_VALUE: 27
PIF_VALUE: 27
PIF_VALUE: 26
PIF_VALUE: 26
PIF_VALUE: 27
PIF_VALUE: 26
PIF_VALUE: 25
PIF_VALUE: 29

## 2024-06-21 ASSESSMENT — PAIN - FUNCTIONAL ASSESSMENT: PAIN_FUNCTIONAL_ASSESSMENT: NONE - DENIES PAIN

## 2024-06-21 NOTE — ED PROVIDER NOTES
eMERGENCY dEPARTMENT eNCOUnter      Pt Name: Ramon Roger  MRN: [id number]  Birthdate 1960  Date of evaluation: 6/21/24      CHIEF COMPLAINT       Chief Complaint   Patient presents with    Altered Mental Status         HISTORY OF PRESENT ILLNESS    Ramon Roger is a 63 y.o. male who presents complaining of altered mental status.  Patient evidently was found on the floor at his home.  Patient's last known well was 3 days ago.  Patient is not able to answer questions as he does know that he was on the floor does not know if he fell.  Patient is diabetic initial blood sugar by EMS was 67 and got improvement up to 200 with D10.  Patient is shivering a lot but not telling me he has been sick at all.  Patient has been getting treated by wound care for a wound on his left lower extremity.  Other history is fully obtained by chart review at this time.      REVIEW OF SYSTEMS       Review of Systems   Unable to perform ROS: Mental status change       PAST MEDICAL HISTORY     Past Medical History:   Diagnosis Date    Adenomatous polyp of colon     ALT (SGPT) level raised     Asbestos exposure     Asthma     BPH (benign prostatic hyperplasia)     Diabetes mellitus (HCC)     ED (erectile dysfunction)     Esophageal reflux     Hearing loss     History of depression     Lumbar radiculopathy     Neuropathy     Osteoarthritis     Tinnitus of left ear        SURGICAL HISTORY       Past Surgical History:   Procedure Laterality Date    APPENDECTOMY      BACK SURGERY      ANT SPINAL DISKECTOMY    COLONOSCOPY  05/06/2011    POLYPECTOMY-DR LORENZO MIMS    INCISION AND DRAINAGE RETROPHYARYNGEAL ABCESS      NASAL SEPTUM SURGERY      SINUS SURGERY         CURRENT MEDICATIONS       Previous Medications    FREESTYLE LITE STRIP        GABAPENTIN (NEURONTIN) 300 MG CAPSULE    Take 600 mg by mouth nightly.     IBUPROFEN (ADVIL;MOTRIN) 600 MG TABLET    Take 600 mg by mouth nightly    IBUPROFEN (ADVIL;MOTRIN) 800 MG TABLET    Take 1

## 2024-06-21 NOTE — FLOWSHEET NOTE
06/21/24 1247 06/21/24 1250   Oxygen Therapy   SpO2 (!) 89 % 93 %   O2 Device None (Room air) Nasal cannula   O2 Flow Rate (L/min)  --  2 L/min     O2 sat noted to be on 89% on RA with good waveform. Patient placed on high richter's position and 2L of O2 per nasal cannula with O2 improvement of 93%

## 2024-06-21 NOTE — ED TRIAGE NOTES
Mode of arrival (squad #, walk in, police, etc) : EMS        Chief complaint(s): altered mental status        Arrival Note (brief scenario, treatment PTA, etc).: Patient brought in by EMS from home c/o altered mental status. Patient found on the floor, might be lying there between 1-2 days. Patient was initially unresponsive with glucose 67, EMS gave D10 250ml with improvement of BG to 198 and becoming more alert. Patient is known diabetic and has leg infection.        C= \"Have you ever felt that you should Cut down on your drinking?\"  No  A= \"Have people Annoyed you by criticizing your drinking?\"  No  G= \"Have you ever felt bad or Guilty about your drinking?\"  No  E= \"Have you ever had a drink as an Eye-opener first thing in the morning to steady your nerves or to help a hangover?\"  No      Deferred []      Reason for deferring: N/A    *If yes to two or more: probable alcohol abuse.*

## 2024-06-21 NOTE — ED NOTES
Report given to BEST Nichols from ICU.   Report method in person   The following was reviewed with receiving RN:   Current vital signs:  /60   Pulse (!) 126   Temp 98.7 °F (37.1 °C) (Oral)   Resp (!) 44   Ht 1.778 m (5' 10\")   Wt 99.8 kg (220 lb)   SpO2 92%   BMI 31.57 kg/m²                      Any medication or safety alerts were reviewed. Any pending diagnostics and notifications were also reviewed, as well as any safety concerns or issues, abnormal labs, abnormal imaging, and abnormal assessment findings. Questions were answered.

## 2024-06-22 ENCOUNTER — APPOINTMENT (OUTPATIENT)
Dept: GENERAL RADIOLOGY | Age: 64
DRG: 870 | End: 2024-06-22
Payer: MEDICARE

## 2024-06-22 ENCOUNTER — APPOINTMENT (OUTPATIENT)
Dept: MRI IMAGING | Age: 64
DRG: 870 | End: 2024-06-22
Payer: MEDICARE

## 2024-06-22 ENCOUNTER — ANESTHESIA (OUTPATIENT)
Dept: OPERATING ROOM | Age: 64
End: 2024-06-22
Payer: MEDICARE

## 2024-06-22 ENCOUNTER — ANESTHESIA EVENT (OUTPATIENT)
Dept: OPERATING ROOM | Age: 64
End: 2024-06-22
Payer: MEDICARE

## 2024-06-22 PROBLEM — M00.9 PYOGENIC ARTHRITIS OF LEFT KNEE JOINT (HCC): Status: ACTIVE | Noted: 2024-06-22

## 2024-06-22 LAB
ABSOLUTE BANDS: 6.02 K/UL (ref 0–1)
AMMONIA PLAS-SCNC: 59 UMOL/L (ref 16–60)
ANION GAP SERPL CALCULATED.3IONS-SCNC: 16 MMOL/L (ref 9–17)
ARTERIAL PATENCY WRIST A: ABNORMAL
BANDS: 43 % (ref 0–10)
BASOPHILS # BLD: 0 K/UL (ref 0–0.2)
BASOPHILS NFR BLD: 0 % (ref 0–2)
BDY SITE: ABNORMAL
BODY TEMPERATURE: 39.4
BUN SERPL-MCNC: 90 MG/DL (ref 8–23)
C3 SERPL-MCNC: 150 MG/DL (ref 90–180)
C4 SERPL-MCNC: 27 MG/DL (ref 10–40)
CALCIUM SERPL-MCNC: 7.8 MG/DL (ref 8.6–10.4)
CHLORIDE SERPL-SCNC: 105 MMOL/L (ref 98–107)
CK SERPL-CCNC: 3219 U/L (ref 39–308)
CO2 SERPL-SCNC: 16 MMOL/L (ref 20–31)
COHGB MFR BLD: 1.7 % (ref 0–5)
CREAT SERPL-MCNC: 4.2 MG/DL (ref 0.7–1.2)
CRP SERPL HS-MCNC: 486.4 MG/L (ref 0–5)
DATE LAST DOSE: NORMAL
ECHO BSA: 2.22 M2
EOSINOPHIL # BLD: 0 K/UL (ref 0–0.4)
EOSINOPHILS RELATIVE PERCENT: 0 % (ref 0–4)
ERYTHROCYTE [DISTWIDTH] IN BLOOD BY AUTOMATED COUNT: 16.5 % (ref 11.5–14.9)
ERYTHROCYTE [SEDIMENTATION RATE] IN BLOOD BY PHOTOMETRIC METHOD: 95 MM/HR (ref 0–20)
FIO2 ON VENT: 35 %
FOLATE SERPL-MCNC: 12.4 NG/ML (ref 4.8–24.2)
GAS FLOW.O2 O2 DELIVERY SYS: ABNORMAL L/MIN
GAS FLOW.O2 SETTING OXYMISER: 28 L/MIN
GFR, ESTIMATED: 15 ML/MIN/1.73M2
GLUCOSE BLD-MCNC: 60 MG/DL (ref 75–110)
GLUCOSE BLD-MCNC: 69 MG/DL (ref 75–110)
GLUCOSE BLD-MCNC: 71 MG/DL (ref 75–110)
GLUCOSE BLD-MCNC: 74 MG/DL (ref 75–110)
GLUCOSE BLD-MCNC: 75 MG/DL (ref 75–110)
GLUCOSE BLD-MCNC: 75 MG/DL (ref 75–110)
GLUCOSE BLD-MCNC: 78 MG/DL (ref 75–110)
GLUCOSE BLD-MCNC: 96 MG/DL (ref 75–110)
GLUCOSE BLD-MCNC: 98 MG/DL (ref 75–110)
GLUCOSE SERPL-MCNC: 72 MG/DL (ref 70–99)
HBV CORE IGM SERPL QL IA: NONREACTIVE
HBV SURFACE AB SERPL IA-ACNC: <3.5 MIU/ML
HBV SURFACE AG SERPL QL IA: NONREACTIVE
HCO3 ARTERIAL: 19.2 MMOL/L (ref 22–26)
HCT VFR BLD AUTO: 38.4 % (ref 41–53)
HCV AB SERPL QL IA: NONREACTIVE
HGB BLD-MCNC: 12.2 G/DL (ref 13.5–17.5)
LYMPHOCYTES NFR BLD: 0.42 K/UL (ref 1–4.8)
LYMPHOCYTES RELATIVE PERCENT: 3 % (ref 24–44)
MCH RBC QN AUTO: 27 PG (ref 26–34)
MCHC RBC AUTO-ENTMCNC: 31.8 G/DL (ref 31–37)
MCV RBC AUTO: 84.9 FL (ref 80–100)
METAMYELOCYTES ABSOLUTE COUNT: 0.7 K/UL
METAMYELOCYTES: 5 %
METHEMOGLOBIN: 1.5 % (ref 0–1.9)
MICROORGANISM SPEC CULT: ABNORMAL
MICROORGANISM SPEC CULT: NO GROWTH
MONOCYTES NFR BLD: 0.42 K/UL (ref 0.1–1.3)
MONOCYTES NFR BLD: 3 % (ref 1–7)
MORPHOLOGY: ABNORMAL
NEGATIVE BASE EXCESS, ART: 6.5 MMOL/L (ref 0–2)
NEUTROPHILS NFR BLD: 46 % (ref 36–66)
NEUTS SEG NFR BLD: 6.44 K/UL (ref 1.3–9.1)
O2 SAT, ARTERIAL: 95.4 % (ref 95–98)
PCO2 ARTERIAL: 35.1 MMHG (ref 35–45)
PCO2, ART, TEMP ADJ: 39 (ref 35–45)
PEEP RESPIRATORY: 8 CM[H2O]
PH ARTERIAL: 7.35 (ref 7.35–7.45)
PH, ART, TEMP ADJ: 7.31 (ref 7.35–7.45)
PLATELET # BLD AUTO: 110 K/UL (ref 150–450)
PMV BLD AUTO: 10.7 FL (ref 6–12)
PO2 ARTERIAL: 124 MMHG (ref 80–100)
PO2, ART, TEMP ADJ: 138 MMHG (ref 80–100)
POTASSIUM SERPL-SCNC: 4.8 MMOL/L (ref 3.7–5.3)
PT. POSITION: ABNORMAL
RBC # BLD AUTO: 4.52 M/UL (ref 4.5–5.9)
RESPIRATORY RATE: 28
SERVICE CMNT-IMP: ABNORMAL
SERVICE CMNT-IMP: ABNORMAL
SERVICE CMNT-IMP: NORMAL
SODIUM SERPL-SCNC: 137 MMOL/L (ref 135–144)
SPECIMEN DESCRIPTION: ABNORMAL
SPECIMEN DESCRIPTION: ABNORMAL
SPECIMEN DESCRIPTION: NORMAL
TEXT FOR RESPIRATORY: ABNORMAL
TME LAST DOSE: 1309 H
TOTAL RATE: 30
TRIGL SERPL-MCNC: 213 MG/DL
TSH SERPL DL<=0.05 MIU/L-ACNC: 1.9 UIU/ML (ref 0.3–5)
VANCOMYCIN DOSE: NORMAL MG
VANCOMYCIN SERPL-MCNC: 27.6 UG/ML
VENTILATION MODE VENT: ABNORMAL
VIT B12 SERPL-MCNC: 508 PG/ML (ref 232–1245)
VT: 500
WBC OTHER # BLD: 14 K/UL (ref 3.5–11)

## 2024-06-22 PROCEDURE — 85652 RBC SED RATE AUTOMATED: CPT

## 2024-06-22 PROCEDURE — 6360000002 HC RX W HCPCS: Performed by: ORTHOPAEDIC SURGERY

## 2024-06-22 PROCEDURE — 99233 SBSQ HOSP IP/OBS HIGH 50: CPT | Performed by: INTERNAL MEDICINE

## 2024-06-22 PROCEDURE — 6370000000 HC RX 637 (ALT 250 FOR IP)

## 2024-06-22 PROCEDURE — 89220 SPUTUM SPECIMEN COLLECTION: CPT

## 2024-06-22 PROCEDURE — 2709999900 HC NON-CHARGEABLE SUPPLY: Performed by: ORTHOPAEDIC SURGERY

## 2024-06-22 PROCEDURE — 6360000002 HC RX W HCPCS: Performed by: ANESTHESIOLOGY

## 2024-06-22 PROCEDURE — 86225 DNA ANTIBODY NATIVE: CPT

## 2024-06-22 PROCEDURE — 83516 IMMUNOASSAY NONANTIBODY: CPT

## 2024-06-22 PROCEDURE — 87205 SMEAR GRAM STAIN: CPT

## 2024-06-22 PROCEDURE — 2580000003 HC RX 258: Performed by: ORTHOPAEDIC SURGERY

## 2024-06-22 PROCEDURE — 2500000003 HC RX 250 WO HCPCS: Performed by: INTERNAL MEDICINE

## 2024-06-22 PROCEDURE — 3600000003 HC SURGERY LEVEL 3 BASE: Performed by: ORTHOPAEDIC SURGERY

## 2024-06-22 PROCEDURE — 51701 INSERT BLADDER CATHETER: CPT

## 2024-06-22 PROCEDURE — 2580000003 HC RX 258

## 2024-06-22 PROCEDURE — 80048 BASIC METABOLIC PNL TOTAL CA: CPT

## 2024-06-22 PROCEDURE — 82140 ASSAY OF AMMONIA: CPT

## 2024-06-22 PROCEDURE — 71045 X-RAY EXAM CHEST 1 VIEW: CPT

## 2024-06-22 PROCEDURE — 82805 BLOOD GASES W/O2 SATURATION: CPT

## 2024-06-22 PROCEDURE — 5A1D70Z PERFORMANCE OF URINARY FILTRATION, INTERMITTENT, LESS THAN 6 HOURS PER DAY: ICD-10-PCS | Performed by: INTERNAL MEDICINE

## 2024-06-22 PROCEDURE — 93971 EXTREMITY STUDY: CPT | Performed by: STUDENT IN AN ORGANIZED HEALTH CARE EDUCATION/TRAINING PROGRAM

## 2024-06-22 PROCEDURE — 2580000003 HC RX 258: Performed by: ANESTHESIOLOGY

## 2024-06-22 PROCEDURE — 84478 ASSAY OF TRIGLYCERIDES: CPT

## 2024-06-22 PROCEDURE — 87075 CULTR BACTERIA EXCEPT BLOOD: CPT

## 2024-06-22 PROCEDURE — 86140 C-REACTIVE PROTEIN: CPT

## 2024-06-22 PROCEDURE — 99223 1ST HOSP IP/OBS HIGH 75: CPT | Performed by: INTERNAL MEDICINE

## 2024-06-22 PROCEDURE — 87641 MR-STAPH DNA AMP PROBE: CPT

## 2024-06-22 PROCEDURE — 51798 US URINE CAPACITY MEASURE: CPT

## 2024-06-22 PROCEDURE — 86705 HEP B CORE ANTIBODY IGM: CPT

## 2024-06-22 PROCEDURE — 3700000000 HC ANESTHESIA ATTENDED CARE: Performed by: ORTHOPAEDIC SURGERY

## 2024-06-22 PROCEDURE — 80202 ASSAY OF VANCOMYCIN: CPT

## 2024-06-22 PROCEDURE — 86160 COMPLEMENT ANTIGEN: CPT

## 2024-06-22 PROCEDURE — 82746 ASSAY OF FOLIC ACID SERUM: CPT

## 2024-06-22 PROCEDURE — 82947 ASSAY GLUCOSE BLOOD QUANT: CPT

## 2024-06-22 PROCEDURE — 2500000003 HC RX 250 WO HCPCS: Performed by: ANESTHESIOLOGY

## 2024-06-22 PROCEDURE — 87077 CULTURE AEROBIC IDENTIFY: CPT

## 2024-06-22 PROCEDURE — 94003 VENT MGMT INPAT SUBQ DAY: CPT

## 2024-06-22 PROCEDURE — 5A1955Z RESPIRATORY VENTILATION, GREATER THAN 96 CONSECUTIVE HOURS: ICD-10-PCS | Performed by: INTERNAL MEDICINE

## 2024-06-22 PROCEDURE — 73560 X-RAY EXAM OF KNEE 1 OR 2: CPT

## 2024-06-22 PROCEDURE — 6360000002 HC RX W HCPCS

## 2024-06-22 PROCEDURE — 87340 HEPATITIS B SURFACE AG IA: CPT

## 2024-06-22 PROCEDURE — 2580000003 HC RX 258: Performed by: INTERNAL MEDICINE

## 2024-06-22 PROCEDURE — 86038 ANTINUCLEAR ANTIBODIES: CPT

## 2024-06-22 PROCEDURE — 0S9D4ZZ DRAINAGE OF LEFT KNEE JOINT, PERCUTANEOUS ENDOSCOPIC APPROACH: ICD-10-PCS | Performed by: INTERNAL MEDICINE

## 2024-06-22 PROCEDURE — 86803 HEPATITIS C AB TEST: CPT

## 2024-06-22 PROCEDURE — 6360000002 HC RX W HCPCS: Performed by: INTERNAL MEDICINE

## 2024-06-22 PROCEDURE — 36415 COLL VENOUS BLD VENIPUNCTURE: CPT

## 2024-06-22 PROCEDURE — 3700000001 HC ADD 15 MINUTES (ANESTHESIA): Performed by: ORTHOPAEDIC SURGERY

## 2024-06-22 PROCEDURE — 2500000003 HC RX 250 WO HCPCS

## 2024-06-22 PROCEDURE — 2700000000 HC OXYGEN THERAPY PER DAY

## 2024-06-22 PROCEDURE — 2000000000 HC ICU R&B

## 2024-06-22 PROCEDURE — 82607 VITAMIN B-12: CPT

## 2024-06-22 PROCEDURE — 94761 N-INVAS EAR/PLS OXIMETRY MLT: CPT

## 2024-06-22 PROCEDURE — 86021 WBC ANTIBODY IDENTIFICATION: CPT

## 2024-06-22 PROCEDURE — 82550 ASSAY OF CK (CPK): CPT

## 2024-06-22 PROCEDURE — 86317 IMMUNOASSAY INFECTIOUS AGENT: CPT

## 2024-06-22 PROCEDURE — 2580000003 HC RX 258: Performed by: EMERGENCY MEDICINE

## 2024-06-22 PROCEDURE — 99222 1ST HOSP IP/OBS MODERATE 55: CPT | Performed by: ORTHOPAEDIC SURGERY

## 2024-06-22 PROCEDURE — 2500000003 HC RX 250 WO HCPCS: Performed by: ORTHOPAEDIC SURGERY

## 2024-06-22 PROCEDURE — 85025 COMPLETE CBC W/AUTO DIFF WBC: CPT

## 2024-06-22 PROCEDURE — 87070 CULTURE OTHR SPECIMN AEROBIC: CPT

## 2024-06-22 PROCEDURE — 83036 HEMOGLOBIN GLYCOSYLATED A1C: CPT

## 2024-06-22 PROCEDURE — 99233 SBSQ HOSP IP/OBS HIGH 50: CPT | Performed by: PSYCHIATRY & NEUROLOGY

## 2024-06-22 PROCEDURE — 90935 HEMODIALYSIS ONE EVALUATION: CPT

## 2024-06-22 PROCEDURE — 3600000013 HC SURGERY LEVEL 3 ADDTL 15MIN: Performed by: ORTHOPAEDIC SURGERY

## 2024-06-22 PROCEDURE — 84443 ASSAY THYROID STIM HORMONE: CPT

## 2024-06-22 RX ORDER — MIDAZOLAM HYDROCHLORIDE 1 MG/ML
INJECTION INTRAMUSCULAR; INTRAVENOUS PRN
Status: DISCONTINUED | OUTPATIENT
Start: 2024-06-22 | End: 2024-06-22 | Stop reason: SDUPTHER

## 2024-06-22 RX ORDER — SODIUM CHLORIDE 9 MG/ML
INJECTION, SOLUTION INTRAVENOUS CONTINUOUS PRN
Status: DISCONTINUED | OUTPATIENT
Start: 2024-06-22 | End: 2024-06-22

## 2024-06-22 RX ORDER — HEPARIN 100 UNIT/ML
500 SYRINGE INTRAVENOUS PRN
Status: DISCONTINUED | OUTPATIENT
Start: 2024-06-22 | End: 2024-07-01 | Stop reason: HOSPADM

## 2024-06-22 RX ORDER — PHENYLEPHRINE HYDROCHLORIDE 10 MG/ML
INJECTION INTRAVENOUS PRN
Status: DISCONTINUED | OUTPATIENT
Start: 2024-06-22 | End: 2024-06-22 | Stop reason: SDUPTHER

## 2024-06-22 RX ORDER — FENTANYL CITRATE 50 UG/ML
INJECTION, SOLUTION INTRAMUSCULAR; INTRAVENOUS PRN
Status: DISCONTINUED | OUTPATIENT
Start: 2024-06-22 | End: 2024-06-22 | Stop reason: SDUPTHER

## 2024-06-22 RX ORDER — SODIUM CHLORIDE 9 MG/ML
INJECTION, SOLUTION INTRAVENOUS CONTINUOUS PRN
Status: DISCONTINUED | OUTPATIENT
Start: 2024-06-22 | End: 2024-06-22 | Stop reason: SDUPTHER

## 2024-06-22 RX ORDER — ROCURONIUM BROMIDE 10 MG/ML
INJECTION, SOLUTION INTRAVENOUS PRN
Status: DISCONTINUED | OUTPATIENT
Start: 2024-06-22 | End: 2024-06-22 | Stop reason: SDUPTHER

## 2024-06-22 RX ADMIN — FENTANYL CITRATE 50 MCG: 0.05 INJECTION, SOLUTION INTRAMUSCULAR; INTRAVENOUS at 20:21

## 2024-06-22 RX ADMIN — FAMOTIDINE 20 MG: 10 INJECTION, SOLUTION INTRAVENOUS at 11:23

## 2024-06-22 RX ADMIN — FENTANYL CITRATE 50 MCG: 50 INJECTION, SOLUTION INTRAMUSCULAR; INTRAVENOUS at 14:23

## 2024-06-22 RX ADMIN — PHENYLEPHRINE HYDROCHLORIDE 200 MCG: 10 INJECTION INTRAVENOUS at 14:00

## 2024-06-22 RX ADMIN — PROPOFOL 35 MCG/KG/MIN: 10 INJECTION, EMULSION INTRAVENOUS at 22:22

## 2024-06-22 RX ADMIN — FENTANYL CITRATE 50 MCG: 0.05 INJECTION, SOLUTION INTRAMUSCULAR; INTRAVENOUS at 22:23

## 2024-06-22 RX ADMIN — ROCURONIUM BROMIDE 30 MG: 10 INJECTION, SOLUTION INTRAVENOUS at 13:24

## 2024-06-22 RX ADMIN — PROPOFOL 35 MCG/KG/MIN: 10 INJECTION, EMULSION INTRAVENOUS at 08:50

## 2024-06-22 RX ADMIN — PHENYLEPHRINE HYDROCHLORIDE 200 MCG: 10 INJECTION INTRAVENOUS at 13:41

## 2024-06-22 RX ADMIN — SODIUM BICARBONATE: 84 INJECTION, SOLUTION INTRAVENOUS at 21:57

## 2024-06-22 RX ADMIN — PHENYLEPHRINE HYDROCHLORIDE 200 MCG: 10 INJECTION INTRAVENOUS at 13:33

## 2024-06-22 RX ADMIN — PHENYLEPHRINE HYDROCHLORIDE 200 MCG: 10 INJECTION INTRAVENOUS at 13:25

## 2024-06-22 RX ADMIN — SODIUM CHLORIDE, PRESERVATIVE FREE 10 ML: 5 INJECTION INTRAVENOUS at 09:47

## 2024-06-22 RX ADMIN — SODIUM CHLORIDE: 9 INJECTION, SOLUTION INTRAVENOUS at 13:15

## 2024-06-22 RX ADMIN — DEXTROSE MONOHYDRATE 125 ML: 100 INJECTION, SOLUTION INTRAVENOUS at 07:26

## 2024-06-22 RX ADMIN — CEFEPIME 1000 MG: 1 INJECTION, POWDER, FOR SOLUTION INTRAMUSCULAR; INTRAVENOUS at 23:55

## 2024-06-22 RX ADMIN — PHENYLEPHRINE HYDROCHLORIDE 200 MCG: 10 INJECTION INTRAVENOUS at 13:46

## 2024-06-22 RX ADMIN — FENTANYL CITRATE 50 MCG: 0.05 INJECTION, SOLUTION INTRAMUSCULAR; INTRAVENOUS at 06:46

## 2024-06-22 RX ADMIN — Medication 1.2 ML: at 17:45

## 2024-06-22 RX ADMIN — MIDAZOLAM 2 MG: 1 INJECTION INTRAMUSCULAR; INTRAVENOUS at 13:21

## 2024-06-22 RX ADMIN — ACETAMINOPHEN 650 MG: 650 SUPPOSITORY RECTAL at 01:58

## 2024-06-22 RX ADMIN — Medication 1.2 ML: at 17:46

## 2024-06-22 RX ADMIN — DEXTROSE AND SODIUM CHLORIDE: 5; 900 INJECTION, SOLUTION INTRAVENOUS at 02:31

## 2024-06-22 RX ADMIN — DEXTROSE MONOHYDRATE 125 ML: 100 INJECTION, SOLUTION INTRAVENOUS at 18:27

## 2024-06-22 RX ADMIN — PROPOFOL 25 MCG/KG/MIN: 10 INJECTION, EMULSION INTRAVENOUS at 02:33

## 2024-06-22 RX ADMIN — CEFEPIME 1000 MG: 1 INJECTION, POWDER, FOR SOLUTION INTRAMUSCULAR; INTRAVENOUS at 00:03

## 2024-06-22 RX ADMIN — PROPOFOL 25 MCG/KG/MIN: 10 INJECTION, EMULSION INTRAVENOUS at 17:53

## 2024-06-22 RX ADMIN — SODIUM BICARBONATE: 84 INJECTION, SOLUTION INTRAVENOUS at 11:23

## 2024-06-22 RX ADMIN — FENTANYL CITRATE 50 MCG: 50 INJECTION, SOLUTION INTRAMUSCULAR; INTRAVENOUS at 13:28

## 2024-06-22 ASSESSMENT — PULMONARY FUNCTION TESTS
PIF_VALUE: 27
PIF_VALUE: 23
PIF_VALUE: 29
PIF_VALUE: 25
PIF_VALUE: 23
PIF_VALUE: 20
PIF_VALUE: 24
PIF_VALUE: 27
PIF_VALUE: 25
PIF_VALUE: 19
PIF_VALUE: 37
PIF_VALUE: 25
PIF_VALUE: 23
PIF_VALUE: 24
PIF_VALUE: 26
PIF_VALUE: 20
PIF_VALUE: 25
PIF_VALUE: 28
PIF_VALUE: 28
PIF_VALUE: 23
PIF_VALUE: 25
PIF_VALUE: 28
PIF_VALUE: 20
PIF_VALUE: 26
PIF_VALUE: 25
PIF_VALUE: 24
PIF_VALUE: 26

## 2024-06-22 ASSESSMENT — PAIN - FUNCTIONAL ASSESSMENT: PAIN_FUNCTIONAL_ASSESSMENT: ACTIVITIES ARE NOT PREVENTED

## 2024-06-22 NOTE — CARE COORDINATION
Case Management Assessment  Initial Evaluation    Date/Time of Evaluation: 6/22/2024 4:31 PM  Assessment Completed by: Adrianne Sarmiento RN    If patient is discharged prior to next notation, then this note serves as note for discharge by case management.    Patient Name: Ramon Roger                   YOB: 1960  Diagnosis: Leg edema [R60.0]  Osteomyelitis (HCC) [M86.9]  Wound infection [T14.8XXA, L08.9]  Severe sepsis (HCC) [A41.9, R65.20]  Traumatic rhabdomyolysis, initial encounter ([facility]) [T79.6XXA]  Acute renal failure, unspecified acute renal failure type ([facility]) [N17.9]                   Date / Time: 6/21/2024 11:02 AM    Patient Admission Status: Inpatient   Readmission Risk (Low < 19, Mod (19-27), High > 27): Readmission Risk Score: 14.4    Current PCP: No primary care provider on file.  PCP verified by CM? Yes    Chart Reviewed: Yes      History Provided by: Child/Family  Patient Orientation: Unable to Assess (Pt on Vent)    Patient Cognition: Other (see comment) (On Vent)    Hospitalization in the last 30 days (Readmission):  No    If yes, Readmission Assessment in  Navigator will be completed.    Advance Directives:      Code Status: Full Code   Patient's Primary Decision Maker is:        Discharge Planning:    Patient lives with: Alone Type of Home: House  Primary Care Giver: Self  Patient Support Systems include: Children, Family Members   Current Financial resources: Medicare  Current community resources: ECF/Home Care (Current w/ [facility], [facility]  Current services prior to admission: Home Care, Durable Medical Equipment            Current DME:  (Rt Prostetic, Knee Scooter, Toilet Riser/w/handles.)            Type of Home Care services:  Skilled Therapy, Nursing Services (Current w/ [facility])    ADLS  Prior functional level: Independent in ADLs/IADLs  Current functional level: Independent in ADLs/IADLs (Family states, Pt. is mostly independent.)    PT AM-PAC:   /24  OT AM-PAC:

## 2024-06-22 NOTE — BRIEF OP NOTE
Brief Postoperative Note      Patient: Ramon Roger  YOB: 1960  MRN: [id number]    Date of Procedure: 6/22/2024    Pre-Op Diagnosis Codes:     * Acute osteomyelitis (HCC) [M86.10]  Septic arthritis knee  Post-Op Diagnosis: Same       Procedure(s):  KNEE ARTHROSCOPY I/D    Surgeon(s):  Reji Duenas MD    Assistant:  * No surgical staff found *    Anesthesia: General    Estimated Blood Loss (mL): Minimal    Complications: None    Specimens:   * No specimens in log *    Implants:  * No implants in log *      Drains:   Closed/Suction Drain Left Knee Bulb (Active)   Dressing Status New dressing applied 06/22/24 1354       NG/OG/NJ/NE Tube Orogastric Left mouth (Active)   Surrounding Skin Clean, dry & intact 06/22/24 1200   Securement device Other (comment) 06/22/24 1200   Status Clamped 06/22/24 1200   Placement Verified Respiratory Status;External Catheter Length 06/22/24 1200   NG/OG/NJ/NE External Measurement (cm) 65 cm 06/22/24 1200   Drainage Appearance Bile 06/21/24 1751   Output (mL) 25 ml 06/22/24 0000   Action Taken Placement verified (comment) 06/21/24 1751       External Urinary Catheter (Active)   Site Assessment Clean,dry & intact 06/22/24 1200   Placement Repositioned 06/22/24 1200   Securement Method Other (Comment) 06/22/24 1200   Perineal Care Yes 06/21/24 1800   Suction 40 mmgHg continuous 06/22/24 1200   Urine Color Yellow 06/22/24 0800   Urine Appearance Clear 06/22/24 0800   Urine Odor Malodorous 06/22/24 0800   Output (mL) 200 mL 06/22/24 0400       Findings:  Infection Present At Time Of Surgery (PATOS) (choose all levels that have infection present):  - Deep Infection (muscle/fascia) present as evidenced by pus  Other Findings: see dictation    Electronically signed by Reji Duenas MD on 6/22/2024 at 2:27 PM

## 2024-06-22 NOTE — ANESTHESIA PRE PROCEDURE
Department of Anesthesiology  Preprocedure Note       Name:  Ramon Roger   Age:  63 y.o.  :  1960                                          MRN:  [id number]         Date:  2024      Surgeon: Surgeon(s):  Reji Duenas MD    Procedure: Procedure(s):  KNEE ARTHROSCOPY    Medications prior to admission:   Prior to Admission medications    Medication Sig Start Date End Date Taking? Authorizing Provider   amLODIPine (NORVASC) 5 MG tablet Take 1 tablet by mouth daily   Yes Germaine Willis MD   hydrALAZINE (APRESOLINE) 100 MG tablet Take 1 tablet by mouth 3 times daily   Yes Germaine Willis MD   Lactobacillus (ACIDOPHILUS/PECTIN) 100 MG CAPS Take 1 capsule by mouth in the morning, at noon, and at bedtime   Yes Germaine Willis MD   pantoprazole (PROTONIX) 40 MG tablet Take 1 tablet by mouth in the morning and at bedtime   Yes Germaine Willis MD   pregabalin (LYRICA) 150 MG capsule Take 1 capsule by mouth daily. Max Daily Amount: 150 mg   Yes Germaine Willis MD   oxyCODONE-acetaminophen (PERCOCET) 5-325 MG per tablet Take 1 tablet by mouth every 48 hours as needed for Pain. Max Daily Amount: 1 tablet   Yes Germaine Willis MD   ibuprofen (ADVIL;MOTRIN) 600 MG tablet Take 600 mg by mouth nightly  Patient not taking: Reported on 2024    Germaine Willis MD   FREESTYLE LITE strip  3/2/20   Germaine Willis MD   NOVOLOG FLEXPEN 100 UNIT/ML injection pen Inject 20 Units into the skin 3 times daily (before meals) 3/7/20   Germaine Willis MD   TRESIBA FLEXTOUCH 200 UNIT/ML SOPN Inject 120 Units into the skin in the morning and at bedtime 3/7/20   Germaine Willis MD   quinapril (ACCUPRIL) 40 MG tablet Take 40 mg by mouth every morning (before breakfast)  Patient not taking: Reported on 2024    Germaine Willis MD   sertraline (ZOLOFT) 100 MG tablet Take 150 mg by mouth daily 1.5 tablets 3/9/20   Germaine Willis MD   simvastatin (ZOCOR) 80

## 2024-06-22 NOTE — OR NURSING
PATIENT RETURNED TO ICU ROOM FROM SURGERY- ICU NURSE ANGELO AND DR. ROGERS CONFIRMED BEFORE SURGERY THAT PATIENT WAS OK TO GO STRAIGHT BACK UP TO ICU FOLLOWING PROCEDURE. PATIENT TRANSPORTED BY MYSELF, DR. ROGERS, AND RESPIRATORY. REPORT GIVEN TO ICU RNANGELO AT BEDSIDE.

## 2024-06-22 NOTE — ANESTHESIA POSTPROCEDURE EVALUATION
Department of Anesthesiology  Postprocedure Note    Patient: Ramon Roger  MRN: [id number]  YOB: 1960  Date of evaluation: 6/22/2024    Procedure Summary       Date: 06/22/24 Room / Location: [address]    Anesthesia Start: 1315 Anesthesia Stop: 1430    Procedure: KNEE ARTHROSCOPY I/D (Left: Knee) Diagnosis:       Acute osteomyelitis (HCC)      (Acute osteomyelitis (HCC) [M86.10])    Surgeons: Reji Duenas MD Responsible Provider: Shante Galicia MD    Anesthesia Type: general ASA Status: 4            Anesthesia Type: No value filed.    Astrid Phase I:      Astrid Phase II:      Anesthesia Post Evaluation    Comments: POST- ANESTHESIA EVALUATION       Pt Name: Ramon Roger  MRN: [id number]  YOB: 1960  Date of evaluation: 6/22/2024  Time:  2:53 PM      /64  Pulse 93   Temp 97.5 °F (36.4 °C)   Resp 29   Ht 1.778 m (5' 10\")   Wt 98.4 kg (217 lb)   [id number]%   BMI 31.14 kg/m²      Consciousness Level  Awake  Cardiopulmonary Status  Stable  Pain Adequately Treated YES  Nausea / Vomiting  NO  Adequate Hydration  YES  Anesthesia Related Complications NONE      Electronically signed by Shante Galicia MD on 6/22/2024 at 2:53 PM           No notable events documented.

## 2024-06-22 NOTE — PROCEDURES
PROCEDURE NOTE  Date: 6/22/2024   Name: Ramon Roger  YOB: 1960    Procedures  Right jugular ultrasound-guided dialysis catheter placement    Indication: Acute kidney injury    Physician: JULY Rae    Informed consent obtained prior to procedure over the phone from the patient's son Barrie.  Timeout performed.    Description:  Patient laid in supine position.  Right jugular site prepped and draped in sterile fashion.  Vein localized easily on first attempt.  Anesthesia with 2 mL 1% lidocaine.  Vein accessed on first attempt and I was able to advance guidewire without difficulty or resistance.  Catheter inserted using modified Seldinger technique over guidewire.  Packed with heparin and sutured in place with sterile dressing applied.  Estimate blood loss less than 2 mL.    No complications.    Postprocedural chest x-ray pending.    Jeramie Rae MD  Pulmonary and Critical Care  [phone] [employer]  [phone] Cell

## 2024-06-23 ENCOUNTER — APPOINTMENT (OUTPATIENT)
Dept: GENERAL RADIOLOGY | Age: 64
DRG: 870 | End: 2024-06-23
Payer: MEDICARE

## 2024-06-23 ENCOUNTER — APPOINTMENT (OUTPATIENT)
Dept: VASCULAR LAB | Age: 64
DRG: 870 | End: 2024-06-23
Payer: MEDICARE

## 2024-06-23 PROBLEM — E11.628 TYPE 2 DIABETES MELLITUS WITH LEFT DIABETIC FOOT INFECTION (HCC): Status: ACTIVE | Noted: 2024-06-23

## 2024-06-23 PROBLEM — A41.9 SEVERE SEPSIS (HCC): Status: ACTIVE | Noted: 2024-06-23

## 2024-06-23 PROBLEM — R60.0 LEG EDEMA: Status: ACTIVE | Noted: 2024-06-23

## 2024-06-23 PROBLEM — L97.523 ULCER OF LEFT FOOT WITH NECROSIS OF MUSCLE (HCC): Status: ACTIVE | Noted: 2024-06-23

## 2024-06-23 PROBLEM — L08.9 WOUND INFECTION: Status: ACTIVE | Noted: 2024-06-23

## 2024-06-23 PROBLEM — I70.245 ATHEROSCLEROSIS OF NATIVE ARTERIES OF LEFT LEG WITH ULCERATION OF OTHER PART OF FOOT (HCC): Status: ACTIVE | Noted: 2024-06-23

## 2024-06-23 PROBLEM — D69.6 THROMBOCYTOPENIA (HCC): Status: ACTIVE | Noted: 2024-06-23

## 2024-06-23 PROBLEM — T14.8XXA WOUND INFECTION: Status: ACTIVE | Noted: 2024-06-23

## 2024-06-23 PROBLEM — N17.9 ACUTE RENAL FAILURE (HCC): Status: ACTIVE | Noted: 2024-06-23

## 2024-06-23 PROBLEM — R65.20 SEVERE SEPSIS (HCC): Status: ACTIVE | Noted: 2024-06-23

## 2024-06-23 PROBLEM — D64.9 ANEMIA: Status: ACTIVE | Noted: 2024-06-23

## 2024-06-23 PROBLEM — L08.9 TYPE 2 DIABETES MELLITUS WITH LEFT DIABETIC FOOT INFECTION (HCC): Status: ACTIVE | Noted: 2024-06-23

## 2024-06-23 LAB
ABSOLUTE BANDS: 5.05 K/UL (ref 0–1)
ANION GAP SERPL CALCULATED.3IONS-SCNC: 14 MMOL/L (ref 9–17)
ARTERIAL PATENCY WRIST A: ABNORMAL
BANDS: 38 % (ref 0–10)
BASOPHILS # BLD: 0 K/UL (ref 0–0.2)
BASOPHILS NFR BLD: 0 % (ref 0–2)
BDY SITE: ABNORMAL
BODY TEMPERATURE: 37
BUN SERPL-MCNC: 70 MG/DL (ref 8–23)
CALCIUM SERPL-MCNC: 7.3 MG/DL (ref 8.6–10.4)
CHLORIDE SERPL-SCNC: 104 MMOL/L (ref 98–107)
CO2 SERPL-SCNC: 22 MMOL/L (ref 20–31)
COHGB MFR BLD: 1.1 % (ref 0–5)
CREAT SERPL-MCNC: 3.9 MG/DL (ref 0.7–1.2)
CRP SERPL HS-MCNC: 430.5 MG/L (ref 0–5)
EOSINOPHIL # BLD: 0 K/UL (ref 0–0.4)
EOSINOPHILS RELATIVE PERCENT: 0 % (ref 0–4)
ERYTHROCYTE [DISTWIDTH] IN BLOOD BY AUTOMATED COUNT: 16.9 % (ref 11.5–14.9)
ERYTHROCYTE [SEDIMENTATION RATE] IN BLOOD BY PHOTOMETRIC METHOD: 87 MM/HR (ref 0–20)
EST. AVERAGE GLUCOSE BLD GHB EST-MCNC: 177 MG/DL
FIO2 ON VENT: 35 %
GAS FLOW.O2 O2 DELIVERY SYS: ABNORMAL L/MIN
GAS FLOW.O2 SETTING OXYMISER: 28 L/MIN
GFR, ESTIMATED: 16 ML/MIN/1.73M2
GLUCOSE BLD-MCNC: 104 MG/DL (ref 75–110)
GLUCOSE BLD-MCNC: 52 MG/DL (ref 75–110)
GLUCOSE BLD-MCNC: 54 MG/DL (ref 75–110)
GLUCOSE BLD-MCNC: 55 MG/DL (ref 75–110)
GLUCOSE BLD-MCNC: 59 MG/DL (ref 75–110)
GLUCOSE BLD-MCNC: 62 MG/DL (ref 75–110)
GLUCOSE BLD-MCNC: 64 MG/DL (ref 75–110)
GLUCOSE BLD-MCNC: 72 MG/DL (ref 75–110)
GLUCOSE BLD-MCNC: 73 MG/DL (ref 75–110)
GLUCOSE BLD-MCNC: 74 MG/DL (ref 75–110)
GLUCOSE BLD-MCNC: 78 MG/DL (ref 75–110)
GLUCOSE BLD-MCNC: 81 MG/DL (ref 75–110)
GLUCOSE BLD-MCNC: 82 MG/DL (ref 75–110)
GLUCOSE BLD-MCNC: 82 MG/DL (ref 75–110)
GLUCOSE BLD-MCNC: 87 MG/DL (ref 75–110)
GLUCOSE BLD-MCNC: 88 MG/DL (ref 75–110)
GLUCOSE BLD-MCNC: 88 MG/DL (ref 75–110)
GLUCOSE BLD-MCNC: 96 MG/DL (ref 75–110)
GLUCOSE SERPL-MCNC: 76 MG/DL (ref 70–99)
HBA1C MFR BLD: 7.8 % (ref 4–6)
HCO3 ARTERIAL: 24.7 MMOL/L (ref 22–26)
HCT VFR BLD AUTO: 33.2 % (ref 41–53)
HGB BLD-MCNC: 10.8 G/DL (ref 13.5–17.5)
LYMPHOCYTES NFR BLD: 0.8 K/UL (ref 1–4.8)
LYMPHOCYTES RELATIVE PERCENT: 6 % (ref 24–44)
MAGNESIUM SERPL-MCNC: 2.3 MG/DL (ref 1.6–2.6)
MCH RBC QN AUTO: 27.4 PG (ref 26–34)
MCHC RBC AUTO-ENTMCNC: 32.5 G/DL (ref 31–37)
MCV RBC AUTO: 84.1 FL (ref 80–100)
METHEMOGLOBIN: 1.2 % (ref 0–1.9)
MICROORGANISM SPEC CULT: ABNORMAL
MICROORGANISM/AGENT SPEC: ABNORMAL
MICROORGANISM/AGENT SPEC: ABNORMAL
MONOCYTES NFR BLD: 0.27 K/UL (ref 0.1–1.3)
MONOCYTES NFR BLD: 2 % (ref 1–7)
MORPHOLOGY: ABNORMAL
MRSA, DNA, NASAL: NEGATIVE
NEUTROPHILS NFR BLD: 54 % (ref 36–66)
NEUTS SEG NFR BLD: 7.18 K/UL (ref 1.3–9.1)
O2 SAT, ARTERIAL: 97 % (ref 95–98)
PCO2 ARTERIAL: 38.4 MMHG (ref 35–45)
PEEP RESPIRATORY: 8 CM[H2O]
PH ARTERIAL: 7.42 (ref 7.35–7.45)
PLATELET # BLD AUTO: 65 K/UL (ref 150–450)
PMV BLD AUTO: 10.6 FL (ref 6–12)
PO2 ARTERIAL: 134 MMHG (ref 80–100)
POSITIVE BASE EXCESS, ART: 0.2 MMOL/L (ref 0–2)
POTASSIUM SERPL-SCNC: 3.4 MMOL/L (ref 3.7–5.3)
PT. POSITION: ABNORMAL
RBC # BLD AUTO: 3.94 M/UL (ref 4.5–5.9)
SERVICE CMNT-IMP: ABNORMAL
SODIUM SERPL-SCNC: 140 MMOL/L (ref 135–144)
SPECIMEN DESCRIPTION: ABNORMAL
SPECIMEN DESCRIPTION: NORMAL
TEXT FOR RESPIRATORY: ABNORMAL
TOTAL RATE: 32
VENTILATION MODE VENT: ABNORMAL
VT: 500
WBC OTHER # BLD: 13.3 K/UL (ref 3.5–11)

## 2024-06-23 PROCEDURE — 86140 C-REACTIVE PROTEIN: CPT

## 2024-06-23 PROCEDURE — 6360000002 HC RX W HCPCS: Performed by: ORTHOPAEDIC SURGERY

## 2024-06-23 PROCEDURE — 80048 BASIC METABOLIC PNL TOTAL CA: CPT

## 2024-06-23 PROCEDURE — 2500000003 HC RX 250 WO HCPCS: Performed by: ORTHOPAEDIC SURGERY

## 2024-06-23 PROCEDURE — 6360000002 HC RX W HCPCS: Performed by: INTERNAL MEDICINE

## 2024-06-23 PROCEDURE — 85025 COMPLETE CBC W/AUTO DIFF WBC: CPT

## 2024-06-23 PROCEDURE — 99024 POSTOP FOLLOW-UP VISIT: CPT | Performed by: ORTHOPAEDIC SURGERY

## 2024-06-23 PROCEDURE — 94003 VENT MGMT INPAT SUBQ DAY: CPT

## 2024-06-23 PROCEDURE — 2580000003 HC RX 258: Performed by: INTERNAL MEDICINE

## 2024-06-23 PROCEDURE — 2700000000 HC OXYGEN THERAPY PER DAY

## 2024-06-23 PROCEDURE — 99232 SBSQ HOSP IP/OBS MODERATE 35: CPT | Performed by: PSYCHIATRY & NEUROLOGY

## 2024-06-23 PROCEDURE — 94761 N-INVAS EAR/PLS OXIMETRY MLT: CPT

## 2024-06-23 PROCEDURE — 74018 RADEX ABDOMEN 1 VIEW: CPT

## 2024-06-23 PROCEDURE — 99222 1ST HOSP IP/OBS MODERATE 55: CPT | Performed by: STUDENT IN AN ORGANIZED HEALTH CARE EDUCATION/TRAINING PROGRAM

## 2024-06-23 PROCEDURE — 2580000003 HC RX 258: Performed by: ORTHOPAEDIC SURGERY

## 2024-06-23 PROCEDURE — 82805 BLOOD GASES W/O2 SATURATION: CPT

## 2024-06-23 PROCEDURE — 85652 RBC SED RATE AUTOMATED: CPT

## 2024-06-23 PROCEDURE — 83735 ASSAY OF MAGNESIUM: CPT

## 2024-06-23 PROCEDURE — 82947 ASSAY GLUCOSE BLOOD QUANT: CPT

## 2024-06-23 PROCEDURE — 51798 US URINE CAPACITY MEASURE: CPT

## 2024-06-23 PROCEDURE — 99233 SBSQ HOSP IP/OBS HIGH 50: CPT | Performed by: INTERNAL MEDICINE

## 2024-06-23 PROCEDURE — 99232 SBSQ HOSP IP/OBS MODERATE 35: CPT | Performed by: PODIATRIST

## 2024-06-23 PROCEDURE — 36600 WITHDRAWAL OF ARTERIAL BLOOD: CPT

## 2024-06-23 PROCEDURE — 99223 1ST HOSP IP/OBS HIGH 75: CPT | Performed by: INTERNAL MEDICINE

## 2024-06-23 PROCEDURE — 36415 COLL VENOUS BLD VENIPUNCTURE: CPT

## 2024-06-23 PROCEDURE — 2000000000 HC ICU R&B

## 2024-06-23 PROCEDURE — 6370000000 HC RX 637 (ALT 250 FOR IP): Performed by: ORTHOPAEDIC SURGERY

## 2024-06-23 PROCEDURE — 71045 X-RAY EXAM CHEST 1 VIEW: CPT

## 2024-06-23 RX ORDER — POTASSIUM CHLORIDE 7.45 MG/ML
10 INJECTION INTRAVENOUS
Status: COMPLETED | OUTPATIENT
Start: 2024-06-23 | End: 2024-06-23

## 2024-06-23 RX ORDER — HEPARIN SODIUM 5000 [USP'U]/ML
5000 INJECTION, SOLUTION INTRAVENOUS; SUBCUTANEOUS EVERY 8 HOURS SCHEDULED
Status: CANCELLED | OUTPATIENT
Start: 2024-06-23

## 2024-06-23 RX ORDER — DEXTROSE MONOHYDRATE AND SODIUM CHLORIDE 5; .9 G/100ML; G/100ML
INJECTION, SOLUTION INTRAVENOUS CONTINUOUS
Status: DISCONTINUED | OUTPATIENT
Start: 2024-06-23 | End: 2024-06-26

## 2024-06-23 RX ADMIN — DEXTROSE MONOHYDRATE 125 ML: 100 INJECTION, SOLUTION INTRAVENOUS at 05:54

## 2024-06-23 RX ADMIN — SODIUM BICARBONATE: 84 INJECTION, SOLUTION INTRAVENOUS at 10:03

## 2024-06-23 RX ADMIN — PROPOFOL 30 MCG/KG/MIN: 10 INJECTION, EMULSION INTRAVENOUS at 14:58

## 2024-06-23 RX ADMIN — POTASSIUM CHLORIDE: 2 INJECTION, SOLUTION, CONCENTRATE INTRAVENOUS at 13:48

## 2024-06-23 RX ADMIN — DEXTROSE MONOHYDRATE 125 ML: 100 INJECTION, SOLUTION INTRAVENOUS at 15:41

## 2024-06-23 RX ADMIN — DEXTROSE AND SODIUM CHLORIDE: 5; 900 INJECTION, SOLUTION INTRAVENOUS at 19:22

## 2024-06-23 RX ADMIN — DEXTROSE MONOHYDRATE 125 ML: 100 INJECTION, SOLUTION INTRAVENOUS at 17:46

## 2024-06-23 RX ADMIN — DEXTROSE MONOHYDRATE 125 ML: 100 INJECTION, SOLUTION INTRAVENOUS at 21:07

## 2024-06-23 RX ADMIN — PROPOFOL 30 MCG/KG/MIN: 10 INJECTION, EMULSION INTRAVENOUS at 10:04

## 2024-06-23 RX ADMIN — SODIUM CHLORIDE, PRESERVATIVE FREE 10 ML: 5 INJECTION INTRAVENOUS at 09:05

## 2024-06-23 RX ADMIN — SERTRALINE HYDROCHLORIDE 150 MG: 100 TABLET ORAL at 08:50

## 2024-06-23 RX ADMIN — FAMOTIDINE 20 MG: 10 INJECTION, SOLUTION INTRAVENOUS at 08:49

## 2024-06-23 RX ADMIN — DEXTROSE MONOHYDRATE 125 ML: 100 INJECTION, SOLUTION INTRAVENOUS at 10:00

## 2024-06-23 RX ADMIN — DEXTROSE MONOHYDRATE 125 ML: 100 INJECTION, SOLUTION INTRAVENOUS at 00:40

## 2024-06-23 RX ADMIN — FENTANYL CITRATE 50 MCG: 0.05 INJECTION, SOLUTION INTRAMUSCULAR; INTRAVENOUS at 02:39

## 2024-06-23 RX ADMIN — SODIUM BICARBONATE: 84 INJECTION, SOLUTION INTRAVENOUS at 09:04

## 2024-06-23 RX ADMIN — ACETAMINOPHEN 650 MG: 650 SUPPOSITORY RECTAL at 00:41

## 2024-06-23 RX ADMIN — POTASSIUM CHLORIDE 10 MEQ: 7.46 INJECTION, SOLUTION INTRAVENOUS at 10:41

## 2024-06-23 RX ADMIN — POTASSIUM CHLORIDE 10 MEQ: 7.46 INJECTION, SOLUTION INTRAVENOUS at 09:05

## 2024-06-23 RX ADMIN — PIPERACILLIN AND TAZOBACTAM 3375 MG: 3; .375 INJECTION, POWDER, LYOPHILIZED, FOR SOLUTION INTRAVENOUS at 13:40

## 2024-06-23 ASSESSMENT — PULMONARY FUNCTION TESTS
PIF_VALUE: 28
PIF_VALUE: 26
PIF_VALUE: 25
PIF_VALUE: 25
PIF_VALUE: 27
PIF_VALUE: 26
PIF_VALUE: 26
PIF_VALUE: 21
PIF_VALUE: 27
PIF_VALUE: 28
PIF_VALUE: 26
PIF_VALUE: 26
PIF_VALUE: 21
PIF_VALUE: 28
PIF_VALUE: 26
PIF_VALUE: 38
PIF_VALUE: 19
PIF_VALUE: 27
PIF_VALUE: 25
PIF_VALUE: 21
PIF_VALUE: 25
PIF_VALUE: 24
PIF_VALUE: 15
PIF_VALUE: 25
PIF_VALUE: 28
PIF_VALUE: 25
PIF_VALUE: 24
PIF_VALUE: 27
PIF_VALUE: 23
PIF_VALUE: 23
PIF_VALUE: 27
PIF_VALUE: 27
PIF_VALUE: 26
PIF_VALUE: 21
PIF_VALUE: 27
PIF_VALUE: 24
PIF_VALUE: 34
PIF_VALUE: 27
PIF_VALUE: 26
PIF_VALUE: 26
PIF_VALUE: 36
PIF_VALUE: 27
PIF_VALUE: 27
PIF_VALUE: 26
PIF_VALUE: 23
PIF_VALUE: 26
PIF_VALUE: 27
PIF_VALUE: 24
PIF_VALUE: 26
PIF_VALUE: 25
PIF_VALUE: 24
PIF_VALUE: 29
PIF_VALUE: 26
PIF_VALUE: 25
PIF_VALUE: 27
PIF_VALUE: 25
PIF_VALUE: 26
PIF_VALUE: 28
PIF_VALUE: 27

## 2024-06-23 NOTE — CARE COORDINATION
ONGOING DISCHARGE PLAN:    Patient Remains on the Vent, 35% FIO2, ABG, CXR in AM. Pulmonary following.     Pt. Is POD #1, Lt Knee Arthroscopic I & D w/ Ortho.     ID following, Remains on IV Zosyn.    Nephro following. Acute Dialysis was started yesterday, Cr today 3.9.     Current w/ [facility].     Neuro, Per notes, Pending 1 hour EEG.     Unsure of DC plans/needs. Will follow w/ family when more stable.     Will continue to follow for additional discharge needs.    If patient is discharged prior to next notation, then this note serves as note for discharge by case management.    Electronically signed by Adrianne Sarmiento RN on 6/23/2024 at 4:28 PM

## 2024-06-24 ENCOUNTER — APPOINTMENT (OUTPATIENT)
Dept: NEUROLOGY | Age: 64
DRG: 870 | End: 2024-06-24
Payer: MEDICARE

## 2024-06-24 ENCOUNTER — APPOINTMENT (OUTPATIENT)
Dept: GENERAL RADIOLOGY | Age: 64
DRG: 870 | End: 2024-06-24
Payer: MEDICARE

## 2024-06-24 ENCOUNTER — APPOINTMENT (OUTPATIENT)
Dept: ULTRASOUND IMAGING | Age: 64
DRG: 870 | End: 2024-06-24
Payer: MEDICARE

## 2024-06-24 ENCOUNTER — APPOINTMENT (OUTPATIENT)
Age: 64
DRG: 870 | End: 2024-06-24
Attending: ORTHOPAEDIC SURGERY
Payer: MEDICARE

## 2024-06-24 ENCOUNTER — APPOINTMENT (OUTPATIENT)
Dept: VASCULAR LAB | Age: 64
DRG: 870 | End: 2024-06-24
Attending: STUDENT IN AN ORGANIZED HEALTH CARE EDUCATION/TRAINING PROGRAM
Payer: MEDICARE

## 2024-06-24 ENCOUNTER — APPOINTMENT (OUTPATIENT)
Dept: VASCULAR LAB | Age: 64
DRG: 870 | End: 2024-06-24
Payer: MEDICARE

## 2024-06-24 PROBLEM — J96.00 ACUTE RESPIRATORY FAILURE (HCC): Status: ACTIVE | Noted: 2024-06-24

## 2024-06-24 PROBLEM — Z71.89 GOALS OF CARE, COUNSELING/DISCUSSION: Status: ACTIVE | Noted: 2024-06-24

## 2024-06-24 PROBLEM — Z51.5 PALLIATIVE CARE ENCOUNTER: Status: ACTIVE | Noted: 2024-06-24

## 2024-06-24 PROBLEM — Z71.89 ACP (ADVANCE CARE PLANNING): Status: ACTIVE | Noted: 2024-06-24

## 2024-06-24 PROBLEM — Z71.89 DNR (DO NOT RESUSCITATE) DISCUSSION: Status: ACTIVE | Noted: 2024-06-24

## 2024-06-24 LAB
ANA SER QL IA: NEGATIVE
ANION GAP SERPL CALCULATED.3IONS-SCNC: 14 MMOL/L (ref 9–17)
ARTERIAL PATENCY WRIST A: ABNORMAL
BASOPHILS # BLD: 0 K/UL (ref 0–0.2)
BASOPHILS NFR BLD: 0 % (ref 0–2)
BDY SITE: ABNORMAL
BODY TEMPERATURE: 37
BUN SERPL-MCNC: 79 MG/DL (ref 8–23)
CALCIUM SERPL-MCNC: 7.3 MG/DL (ref 8.6–10.4)
CHLORIDE SERPL-SCNC: 104 MMOL/L (ref 98–107)
CHLORIDE UR-SCNC: 35 MMOL/L
CO2 SERPL-SCNC: 21 MMOL/L (ref 20–31)
COHGB MFR BLD: 1.2 % (ref 0–5)
CREAT SERPL-MCNC: 4.3 MG/DL (ref 0.7–1.2)
CRP SERPL HS-MCNC: 346.3 MG/L (ref 0–5)
DSDNA IGG SER QL IA: 1.2 IU/ML
ECHO AO ROOT DIAM: 3.1 CM
ECHO AO ROOT INDEX: 1.41 CM/M2
ECHO AV AREA PEAK VELOCITY: 2 CM2
ECHO AV AREA VTI: 1.9 CM2
ECHO AV AREA/BSA PEAK VELOCITY: 0.9 CM2/M2
ECHO AV AREA/BSA VTI: 0.9 CM2/M2
ECHO AV MEAN GRADIENT: 6 MMHG
ECHO AV MEAN VELOCITY: 1.1 M/S
ECHO AV PEAK GRADIENT: 12 MMHG
ECHO AV PEAK VELOCITY: 1.7 M/S
ECHO AV VELOCITY RATIO: 0.71
ECHO AV VTI: 31.6 CM
ECHO BSA: 2.22 M2
ECHO BSA: 2.22 M2
ECHO BSA: 2.25 M2
ECHO EST RA PRESSURE: 3 MMHG
ECHO LA AREA 2C: 15.3 CM2
ECHO LA AREA 4C: 14.6 CM2
ECHO LA DIAMETER INDEX: 1.86 CM/M2
ECHO LA DIAMETER: 4.1 CM
ECHO LA MAJOR AXIS: 4.9 CM
ECHO LA MINOR AXIS: 4.9 CM
ECHO LA TO AORTIC ROOT RATIO: 1.32
ECHO LA VOL BP: 36 ML (ref 18–58)
ECHO LA VOL MOD A2C: 38 ML (ref 18–58)
ECHO LA VOL MOD A4C: 35 ML (ref 18–58)
ECHO LA VOL/BSA BIPLANE: 16 ML/M2 (ref 16–34)
ECHO LA VOLUME INDEX MOD A2C: 17 ML/M2 (ref 16–34)
ECHO LA VOLUME INDEX MOD A4C: 16 ML/M2 (ref 16–34)
ECHO LV E' SEPTAL VELOCITY: 8 CM/S
ECHO LV EDV A2C: 138 ML
ECHO LV EDV A4C: 128 ML
ECHO LV EDV INDEX A4C: 58 ML/M2
ECHO LV EDV NDEX A2C: 63 ML/M2
ECHO LV EJECTION FRACTION A2C: 50 %
ECHO LV EJECTION FRACTION A4C: 50 %
ECHO LV EJECTION FRACTION BIPLANE: 50 % (ref 55–100)
ECHO LV ESV A2C: 70 ML
ECHO LV ESV A4C: 65 ML
ECHO LV ESV INDEX A2C: 32 ML/M2
ECHO LV ESV INDEX A4C: 30 ML/M2
ECHO LV FRACTIONAL SHORTENING: 27 % (ref 28–44)
ECHO LV INTERNAL DIMENSION DIASTOLE INDEX: 2.5 CM/M2
ECHO LV INTERNAL DIMENSION DIASTOLIC: 5.5 CM (ref 4.2–5.9)
ECHO LV INTERNAL DIMENSION SYSTOLIC INDEX: 1.82 CM/M2
ECHO LV INTERNAL DIMENSION SYSTOLIC: 4 CM
ECHO LV IVSD: 1.1 CM (ref 0.6–1)
ECHO LV MASS 2D: 257 G (ref 88–224)
ECHO LV MASS INDEX 2D: 116.8 G/M2 (ref 49–115)
ECHO LV POSTERIOR WALL DIASTOLIC: 1.2 CM (ref 0.6–1)
ECHO LV RELATIVE WALL THICKNESS RATIO: 0.44
ECHO LVOT AREA: 2.8 CM2
ECHO LVOT AV VTI INDEX: 0.68
ECHO LVOT DIAM: 1.9 CM
ECHO LVOT MEAN GRADIENT: 2 MMHG
ECHO LVOT PEAK GRADIENT: 5 MMHG
ECHO LVOT PEAK VELOCITY: 1.2 M/S
ECHO LVOT STROKE VOLUME INDEX: 27.6 ML/M2
ECHO LVOT SV: 60.6 ML
ECHO LVOT VTI: 21.4 CM
ECHO MV A VELOCITY: 0.67 M/S
ECHO MV E DECELERATION TIME (DT): 194 MS
ECHO MV E VELOCITY: 0.87 M/S
ECHO MV E/A RATIO: 1.3
ECHO MV E/E' SEPTAL: 10.88
ECHO RA AREA 4C: 10.6 CM2
ECHO RA END SYSTOLIC VOLUME APICAL 4 CHAMBER INDEX BSA: 10 ML/M2
ECHO RA VOLUME: 22 ML
ECHO RIGHT VENTRICULAR SYSTOLIC PRESSURE (RVSP): 44 MMHG
ECHO RV TAPSE: 1.9 CM (ref 1.7–?)
ECHO TV REGURGITANT MAX VELOCITY: 3.22 M/S
ECHO TV REGURGITANT PEAK GRADIENT: 41 MMHG
EOSINOPHIL # BLD: 0.12 K/UL (ref 0–0.4)
EOSINOPHILS RELATIVE PERCENT: 1 % (ref 0–4)
ERYTHROCYTE [DISTWIDTH] IN BLOOD BY AUTOMATED COUNT: 17.2 % (ref 11.5–14.9)
ERYTHROCYTE [SEDIMENTATION RATE] IN BLOOD BY PHOTOMETRIC METHOD: 67 MM/HR (ref 0–20)
FIO2 ON VENT: 30 %
GAS FLOW.O2 O2 DELIVERY SYS: ABNORMAL L/MIN
GAS FLOW.O2 SETTING OXYMISER: 28 L/MIN
GBM AB SER-ACNC: <1.9 AU/ML (ref 0–7)
GFR, ESTIMATED: 15 ML/MIN/1.73M2
GLUCOSE BLD-MCNC: 118 MG/DL (ref 75–110)
GLUCOSE BLD-MCNC: 122 MG/DL (ref 75–110)
GLUCOSE BLD-MCNC: 132 MG/DL (ref 75–110)
GLUCOSE BLD-MCNC: 158 MG/DL (ref 75–110)
GLUCOSE BLD-MCNC: 162 MG/DL (ref 75–110)
GLUCOSE BLD-MCNC: 190 MG/DL (ref 75–110)
GLUCOSE BLD-MCNC: 57 MG/DL (ref 75–110)
GLUCOSE BLD-MCNC: 88 MG/DL (ref 75–110)
GLUCOSE SERPL-MCNC: 121 MG/DL (ref 70–99)
HCO3 ARTERIAL: 23.9 MMOL/L (ref 22–26)
HCT VFR BLD AUTO: 32.6 % (ref 41–53)
HGB BLD-MCNC: 10.4 G/DL (ref 13.5–17.5)
LACTATE BLDV-SCNC: 2 MMOL/L (ref 0.5–2.2)
LYMPHOCYTES NFR BLD: 1.5 K/UL (ref 1–4.8)
LYMPHOCYTES RELATIVE PERCENT: 13 % (ref 24–44)
MAGNESIUM SERPL-MCNC: 2.4 MG/DL (ref 1.6–2.6)
MCH RBC QN AUTO: 27.3 PG (ref 26–34)
MCHC RBC AUTO-ENTMCNC: 32 G/DL (ref 31–37)
MCV RBC AUTO: 85.3 FL (ref 80–100)
METHEMOGLOBIN: 1.4 % (ref 0–1.9)
MONOCYTES NFR BLD: 0.81 K/UL (ref 0.1–1.3)
MONOCYTES NFR BLD: 7 % (ref 1–7)
MORPHOLOGY: ABNORMAL
NEGATIVE BASE EXCESS, ART: 0.5 MMOL/L (ref 0–2)
NEUTROPHIL AB SER QL FC: NEGATIVE
NEUTROPHILS NFR BLD: 79 % (ref 36–66)
NEUTS SEG NFR BLD: 9.07 K/UL (ref 1.3–9.1)
NUCLEAR IGG SER IA-RTO: 0.1 U/ML
O2 SAT, ARTERIAL: 96.8 % (ref 95–98)
PCO2 ARTERIAL: 36.6 MMHG (ref 35–45)
PEEP RESPIRATORY: 8 CM[H2O]
PH ARTERIAL: 7.42 (ref 7.35–7.45)
PLATELET # BLD AUTO: 60 K/UL (ref 150–450)
PMV BLD AUTO: 10.8 FL (ref 6–12)
PO2 ARTERIAL: 134 MMHG (ref 80–100)
POTASSIUM SERPL-SCNC: 3.3 MMOL/L (ref 3.7–5.3)
PT. POSITION: ABNORMAL
RBC # BLD AUTO: 3.82 M/UL (ref 4.5–5.9)
SODIUM SERPL-SCNC: 139 MMOL/L (ref 135–144)
TEXT FOR RESPIRATORY: ABNORMAL
TOTAL RATE: 30
TRIGL SERPL-MCNC: 616 MG/DL
VAS LEFT ABI: 1.17
VAS LEFT ARM BP: 155 MMHG
VAS LEFT ATA DIST PSV: 130 CM/S
VAS LEFT CFA DIST PSV: 117.5 CM/S
VAS LEFT DORSALIS PEDIS BP: 159 MMHG
VAS LEFT PERONEAL DIST PSV: 89.3 CM/S
VAS LEFT PFA PROX PSV: 66.7 CM/S
VAS LEFT POP A DIST PSV: 122.1 CM/S
VAS LEFT POP A PROX PSV: 149.7 CM/S
VAS LEFT POP A PROX VEL RATIO: 1.16
VAS LEFT PTA BP: 181 MMHG
VAS LEFT PTA DIST PSV: 193.1 CM/S
VAS LEFT SFA DIST PSV: 129.3 CM/S
VAS LEFT SFA DIST VEL RATIO: 0.9
VAS LEFT SFA MID PSV: 143.8 CM/S
VAS LEFT SFA MID VEL RATIO: 1.46
VAS LEFT SFA PROX PSV: 98.5 CM/S
VAS LEFT SFA PROX VEL RATIO: 0.84
VAS LEFT TBI: 0.27
VAS LEFT TOE PRESSURE: 42 MMHG
VENTILATION MODE VENT: ABNORMAL
VT: 500
WBC OTHER # BLD: 11.5 K/UL (ref 3.5–11)

## 2024-06-24 PROCEDURE — 93926 LOWER EXTREMITY STUDY: CPT

## 2024-06-24 PROCEDURE — 2580000003 HC RX 258: Performed by: INTERNAL MEDICINE

## 2024-06-24 PROCEDURE — 83605 ASSAY OF LACTIC ACID: CPT

## 2024-06-24 PROCEDURE — 93926 LOWER EXTREMITY STUDY: CPT | Performed by: SURGERY

## 2024-06-24 PROCEDURE — 2500000003 HC RX 250 WO HCPCS: Performed by: ORTHOPAEDIC SURGERY

## 2024-06-24 PROCEDURE — 6360000002 HC RX W HCPCS: Performed by: ORTHOPAEDIC SURGERY

## 2024-06-24 PROCEDURE — 93923 UPR/LXTR ART STDY 3+ LVLS: CPT | Performed by: SURGERY

## 2024-06-24 PROCEDURE — 2000000000 HC ICU R&B

## 2024-06-24 PROCEDURE — 99232 SBSQ HOSP IP/OBS MODERATE 35: CPT | Performed by: INTERNAL MEDICINE

## 2024-06-24 PROCEDURE — 2580000003 HC RX 258: Performed by: ORTHOPAEDIC SURGERY

## 2024-06-24 PROCEDURE — 2500000003 HC RX 250 WO HCPCS: Performed by: INTERNAL MEDICINE

## 2024-06-24 PROCEDURE — 95822 EEG COMA OR SLEEP ONLY: CPT

## 2024-06-24 PROCEDURE — 86140 C-REACTIVE PROTEIN: CPT

## 2024-06-24 PROCEDURE — 93306 TTE W/DOPPLER COMPLETE: CPT | Performed by: INTERNAL MEDICINE

## 2024-06-24 PROCEDURE — 36415 COLL VENOUS BLD VENIPUNCTURE: CPT

## 2024-06-24 PROCEDURE — 6360000002 HC RX W HCPCS: Performed by: INTERNAL MEDICINE

## 2024-06-24 PROCEDURE — 82947 ASSAY GLUCOSE BLOOD QUANT: CPT

## 2024-06-24 PROCEDURE — 99211 OFF/OP EST MAY X REQ PHY/QHP: CPT

## 2024-06-24 PROCEDURE — 99233 SBSQ HOSP IP/OBS HIGH 50: CPT | Performed by: INTERNAL MEDICINE

## 2024-06-24 PROCEDURE — 93923 UPR/LXTR ART STDY 3+ LVLS: CPT

## 2024-06-24 PROCEDURE — 99223 1ST HOSP IP/OBS HIGH 75: CPT | Performed by: NURSE PRACTITIONER

## 2024-06-24 PROCEDURE — 82436 ASSAY OF URINE CHLORIDE: CPT

## 2024-06-24 PROCEDURE — 82805 BLOOD GASES W/O2 SATURATION: CPT

## 2024-06-24 PROCEDURE — 83735 ASSAY OF MAGNESIUM: CPT

## 2024-06-24 PROCEDURE — 2700000000 HC OXYGEN THERAPY PER DAY

## 2024-06-24 PROCEDURE — 6360000004 HC RX CONTRAST MEDICATION

## 2024-06-24 PROCEDURE — 6370000000 HC RX 637 (ALT 250 FOR IP): Performed by: ORTHOPAEDIC SURGERY

## 2024-06-24 PROCEDURE — 85025 COMPLETE CBC W/AUTO DIFF WBC: CPT

## 2024-06-24 PROCEDURE — 85652 RBC SED RATE AUTOMATED: CPT

## 2024-06-24 PROCEDURE — 76770 US EXAM ABDO BACK WALL COMP: CPT

## 2024-06-24 PROCEDURE — 36600 WITHDRAWAL OF ARTERIAL BLOOD: CPT

## 2024-06-24 PROCEDURE — 90935 HEMODIALYSIS ONE EVALUATION: CPT

## 2024-06-24 PROCEDURE — 84478 ASSAY OF TRIGLYCERIDES: CPT

## 2024-06-24 PROCEDURE — 99231 SBSQ HOSP IP/OBS SF/LOW 25: CPT | Performed by: PODIATRIST

## 2024-06-24 PROCEDURE — 80048 BASIC METABOLIC PNL TOTAL CA: CPT

## 2024-06-24 PROCEDURE — 94003 VENT MGMT INPAT SUBQ DAY: CPT

## 2024-06-24 PROCEDURE — 71045 X-RAY EXAM CHEST 1 VIEW: CPT

## 2024-06-24 PROCEDURE — 95816 EEG AWAKE AND DROWSY: CPT | Performed by: PSYCHIATRY & NEUROLOGY

## 2024-06-24 PROCEDURE — 99232 SBSQ HOSP IP/OBS MODERATE 35: CPT | Performed by: PSYCHIATRY & NEUROLOGY

## 2024-06-24 PROCEDURE — 94761 N-INVAS EAR/PLS OXIMETRY MLT: CPT

## 2024-06-24 PROCEDURE — C8929 TTE W OR WO FOL WCON,DOPPLER: HCPCS

## 2024-06-24 RX ORDER — FENTANYL CITRATE 0.05 MG/ML
50 INJECTION, SOLUTION INTRAMUSCULAR; INTRAVENOUS EVERY 30 MIN PRN
Status: DISCONTINUED | OUTPATIENT
Start: 2024-06-24 | End: 2024-07-01

## 2024-06-24 RX ORDER — POTASSIUM CHLORIDE 7.45 MG/ML
10 INJECTION INTRAVENOUS
Status: COMPLETED | OUTPATIENT
Start: 2024-06-24 | End: 2024-06-24

## 2024-06-24 RX ORDER — ENOXAPARIN SODIUM 100 MG/ML
30 INJECTION SUBCUTANEOUS DAILY
Status: DISCONTINUED | OUTPATIENT
Start: 2024-06-24 | End: 2024-06-24

## 2024-06-24 RX ADMIN — SERTRALINE HYDROCHLORIDE 150 MG: 100 TABLET ORAL at 07:22

## 2024-06-24 RX ADMIN — DEXTROSE MONOHYDRATE: 100 INJECTION, SOLUTION INTRAVENOUS at 08:04

## 2024-06-24 RX ADMIN — DEXTROSE AND SODIUM CHLORIDE: 5; 900 INJECTION, SOLUTION INTRAVENOUS at 20:39

## 2024-06-24 RX ADMIN — Medication 1.2 ML: at 16:55

## 2024-06-24 RX ADMIN — POTASSIUM CHLORIDE 10 MEQ: 7.46 INJECTION, SOLUTION INTRAVENOUS at 08:05

## 2024-06-24 RX ADMIN — POTASSIUM CHLORIDE 10 MEQ: 7.46 INJECTION, SOLUTION INTRAVENOUS at 06:38

## 2024-06-24 RX ADMIN — DEXTROSE MONOHYDRATE 125 ML: 100 INJECTION, SOLUTION INTRAVENOUS at 00:30

## 2024-06-24 RX ADMIN — SODIUM CHLORIDE, PRESERVATIVE FREE 10 ML: 5 INJECTION INTRAVENOUS at 20:31

## 2024-06-24 RX ADMIN — DEXTROSE MONOHYDRATE: 100 INJECTION, SOLUTION INTRAVENOUS at 04:38

## 2024-06-24 RX ADMIN — PERFLUTREN 2 ML: 6.52 INJECTION, SUSPENSION INTRAVENOUS at 10:35

## 2024-06-24 RX ADMIN — PROPOFOL 30 MCG/KG/MIN: 10 INJECTION, EMULSION INTRAVENOUS at 06:41

## 2024-06-24 RX ADMIN — HEPARIN SODIUM 5000 UNITS: 5000 INJECTION INTRAVENOUS; SUBCUTANEOUS at 18:35

## 2024-06-24 RX ADMIN — FAMOTIDINE 20 MG: 10 INJECTION, SOLUTION INTRAVENOUS at 07:22

## 2024-06-24 RX ADMIN — HEPARIN SODIUM 5000 UNITS: 5000 INJECTION INTRAVENOUS; SUBCUTANEOUS at 21:53

## 2024-06-24 RX ADMIN — PIPERACILLIN AND TAZOBACTAM 3375 MG: 3; .375 INJECTION, POWDER, LYOPHILIZED, FOR SOLUTION INTRAVENOUS at 01:41

## 2024-06-24 RX ADMIN — PIPERACILLIN AND TAZOBACTAM 3375 MG: 3; .375 INJECTION, POWDER, LYOPHILIZED, FOR SOLUTION INTRAVENOUS at 17:42

## 2024-06-24 RX ADMIN — MIDAZOLAM HYDROCHLORIDE 2 MG/HR: 5 INJECTION, SOLUTION INTRAMUSCULAR; INTRAVENOUS at 10:58

## 2024-06-24 RX ADMIN — SODIUM CHLORIDE, PRESERVATIVE FREE 10 ML: 5 INJECTION INTRAVENOUS at 07:33

## 2024-06-24 RX ADMIN — PROPOFOL 30 MCG/KG/MIN: 10 INJECTION, EMULSION INTRAVENOUS at 01:46

## 2024-06-24 RX ADMIN — DEXTROSE MONOHYDRATE: 100 INJECTION, SOLUTION INTRAVENOUS at 01:30

## 2024-06-24 RX ADMIN — DEXTROSE AND SODIUM CHLORIDE: 5; 900 INJECTION, SOLUTION INTRAVENOUS at 06:39

## 2024-06-24 RX ADMIN — FENTANYL CITRATE 50 MCG: 0.05 INJECTION, SOLUTION INTRAMUSCULAR; INTRAVENOUS at 16:19

## 2024-06-24 RX ADMIN — FENTANYL CITRATE 50 MCG: 0.05 INJECTION, SOLUTION INTRAMUSCULAR; INTRAVENOUS at 20:20

## 2024-06-24 ASSESSMENT — PULMONARY FUNCTION TESTS
PIF_VALUE: 27
PIF_VALUE: 37
PIF_VALUE: 28
PIF_VALUE: 27
PIF_VALUE: 36
PIF_VALUE: 27
PIF_VALUE: 27
PIF_VALUE: 26
PIF_VALUE: 29
PIF_VALUE: 20
PIF_VALUE: 26
PIF_VALUE: 31
PIF_VALUE: 9
PIF_VALUE: 34
PIF_VALUE: 24
PIF_VALUE: 32
PIF_VALUE: 27
PIF_VALUE: 30
PIF_VALUE: 25
PIF_VALUE: 27
PIF_VALUE: 34
PIF_VALUE: 25
PIF_VALUE: 26
PIF_VALUE: 27
PIF_VALUE: 29
PIF_VALUE: 37

## 2024-06-24 NOTE — PROCEDURES
PROCEDURE NOTE  Date: 6/24/2024   Name: Ramon Roger  YOB: 1960    Procedures    EEG REPORT       Patient: Ramon Roger Age: 63 y.o.  MRN: [id number]      Referring Provider: No ref. provider found    History: This routine 30 minute scalp EEG was recorded with video- monitoring for a 63 y.o.. male who presented with encephalopathy. This EEG was performed to evaluate for focal and epileptiform abnormalities.     Ramon Roger   Current Facility-Administered Medications   Medication Dose Route Frequency Provider Last Rate Last Admin    midazolam (VERSED) 100 mg in sodium chloride 0.9 % 100 mL infusion  1-10 mg/hr IntraVENous Continuous Cabrera Justice MD 4 mL/hr at 06/24/24 1439 4 mg/hr at 06/24/24 1439    fentaNYL (SUBLIMAZE) injection 50 mcg  50 mcg IntraVENous Q30 Min PRN Cabrera Justice MD   50 mcg at 06/24/24 1619    pantoprazole (PROTONIX) 40 mg in sodium chloride (PF) 0.9 % 10 mL injection  40 mg IntraVENous Daily Cabrera Justice MD        piperacillin-tazobactam (ZOSYN) 3,375 mg in sodium chloride 0.9 % 50 mL IVPB (mini-bag)  3,375 mg IntraVENous Q12H Ying Rosales MD   Stopped at 06/24/24 0541    dextrose 5 % and 0.9 % sodium chloride infusion   IntraVENous Continuous Karishma Pelayo MD 75 mL/hr at 06/24/24 0639 New Bag at 06/24/24 0639    heparin (PF) 100 UNIT/ML injection 500 Units  500 Units IntraCATHeter PRN Reji Duenas MD        anticoagulant sodium citrate 4 % injection 1.2 mL  1.2 mL IntraCATHeter PRN Karishma Pelayo MD   1.2 mL at 06/22/24 1746    anticoagulant sodium citrate 4 % injection 1.2 mL  1.2 mL IntraCATHeter PRN Karishma Pelayo MD   1.2 mL at 06/22/24 1745    sodium chloride flush 0.9 % injection 5-40 mL  5-40 mL IntraVENous 2 times per day Reji Duenas MD   10 mL at 06/22/24 0947    sodium chloride flush 0.9 % injection 5-40 mL  5-40 mL IntraVENous PRN Reji Duenas MD        0.9 % sodium chloride infusion   IntraVENous PRN Reji Duenas

## 2024-06-24 NOTE — CONSULTS
[facility] -   Infectious diseases evaluation  admission date 6/21/2024    reason for consultation:   Elevated WBC, infected wound    Impression :   Current:  Left knee septic arthritis status post arthroscopic I&D  Group A streptococcus bacteremia  Left foot infected wound with underlying osteomyelitis/Enterococcus cloacae and group A streptococcus growth on foot culture  Sepsis   Metabolic acidosis  Acute respiratory failure required intubation June 21, 2024  Acute on chronic renal failure  Peripheral vascular disease  Diabetes mellitus  Rhabdomyolysis  Status post right below-knee amputation.      HENCE:   IV cefepime  Vancomycin was discontinued  Follow cultures, adjust antibiotic as needed  Follow CBC and renal function  Podiatry following, consider vascular surgery evaluation, may need amputation   Supportive care    Infection Control Recommendations   [place] Precautions      Antimicrobial Stewardship Recommendations   Simplification of therapy  Targeted therapy      History of Present Illness:   Initial history:  Ramon Roger is a 63 y.o.-year-old male was brought to the hospital after he was found unresponsive on the floor by family soiled in stool and urine reportedly.  The patient was febrile at the ER with temperature max of 102.9, tachycardic .  The patient was initially confused, was placed on BiPAP initially, subsequently was intubated.  Patient is currently intubated, sedated, unable to provide history that was obtained from chart review and nursing staff.  Initial WBC 21.9, creatinine 2.8, CK3 375, lactic acid elevated  He was found to have left foot wound that was debrided at the bedside by podiatry.  The patient was found to have swelling to the left knee status post aspiration with a purulent fluid.  Status post arthroscopic debridement.  Left foot MRI suggestive of osteomyelitis  IMPRESSION:  1. Deep soft tissue ulceration medial to the 1st 
      Division of Vascular Surgery        New Consult    History of Present Illness:      Ramon Roger is a 63 y.o. male with left foot wound. He also has concern for purulent fluid in his left knee. Underwent left knee arthroscopy with Dr. Deunas on 6/22/24. He had PVR study done at [facility] in May, which showed normal DP. Venous duplex done yesterday which I reviewed myself, shows no evidence of DVT. History of right BKA. Was found down before being brought to the hospital.    Medical History:     Past Medical History:   Diagnosis Date    Adenomatous polyp of colon     ALT (SGPT) level raised     Asbestos exposure     Asthma     BPH (benign prostatic hyperplasia)     Diabetes mellitus (HCC)     ED (erectile dysfunction)     Esophageal reflux     Hearing loss     History of depression     Lumbar radiculopathy     Neuropathy     Osteoarthritis     Tinnitus of left ear        Surgical History:     Past Surgical History:   Procedure Laterality Date    APPENDECTOMY      BACK SURGERY      ANT SPINAL DISKECTOMY    COLONOSCOPY  05/06/2011    POLYPECTOMY-DR LORENZO MIMS    KNEE ARTHROSCOPY Left 6/22/2024    KNEE ARTHROSCOPY I/D performed by Reji Duenas MD at [facility] OR    NASAL SEPTUM SURGERY      RETROPHYARYNGEAL ABCESS INCISION/DRAIN      SINUS SURGERY         Family History:     Family History   Problem Relation Age of Onset    Diabetes Mother     Heart Attack Mother     Diabetes Father     Cancer Father     Diabetes Sister     Heart Attack Sister        Allergies:       Patient has no known allergies.    Medications:      Current Facility-Administered Medications   Medication Dose Route Frequency Provider Last Rate Last Admin    potassium chloride 10 mEq in sodium chloride 0.9 % 1,000 mL infusion   IntraVENous Continuous Karishma Pelayo MD 75 mL/hr at 06/23/24 1348 New Bag at 06/23/24 1348    piperacillin-tazobactam (ZOSYN) 3,375 mg in sodium chloride 0.9 % 50 mL IVPB (mini-bag)  3,375 mg IntraVENous Q12H 
    Consultation Note  Foot and Ankle Surgery     Subjective     Chief Complaint: Left foot wounds    HPI:  Ramon Roger is a 63 y.o. male diabetic patient seen at [facility] for foot wound to the left foot lower extremity. Patient has been following up with Dr. Phillips for routine foot care and debridement. They have had this wound for unknown amount of time. Patient was found unconscious by family today at home. Last known contact with patient 3 days ago.  Patient was unable to answer questions in ED.  Podiatry was consulted for further wound care intervention.     PCP is No primary care provider on file.     ROS:   Review of Systems   Unable to perform ROS: Patient unresponsive       Past Medical History   has a past medical history of Adenomatous polyp of colon, ALT (SGPT) level raised, Asbestos exposure, Asthma, BPH (benign prostatic hyperplasia), Diabetes mellitus (HCC), ED (erectile dysfunction), Esophageal reflux, Hearing loss, History of depression, Lumbar radiculopathy, Neuropathy, Osteoarthritis, and Tinnitus of left ear.    Past Surgical History   has a past surgical history that includes Appendectomy; back surgery; Nasal septum surgery; Incision and drainage retrophyaryngeal abcess; sinus surgery; and Colonoscopy (05/06/2011).    Medications  Prior to Admission medications    Medication Sig Start Date End Date Taking? Authorizing Provider   ibuprofen (ADVIL;MOTRIN) 800 MG tablet Take 1 tablet by mouth every 8 hours as needed for Pain 5/19/20   Grant Bedolla,    ibuprofen (ADVIL;MOTRIN) 600 MG tablet Take 600 mg by mouth nightly    ProviderGermaine MD   gabapentin (NEURONTIN) 300 MG capsule Take 600 mg by mouth nightly.  3/7/20   Germaine Willis MD   FREESTYLE LITE strip  3/2/20   Germaine Willis MD   NOVOLOG FLEXPEN 100 UNIT/ML injection pen Inject into the skin 3 times daily (before meals) maximum 150 units/day 3/7/20   Germaine Willis MD   TRESIBA FLEXTOUCH 200 UNIT/ML 
  [facility]  Len Kaba MD/Cabrera BELTRAN AGACNP-BC, NP-C      Stephanie BELTRAN NP-C      Adis BELTRAN NP-C     Pulmonary and Critical Care CONSULT NOTE:      DATE OF CONSULT 6/22/2024    REASON FOR CONSULTATION:  Critical care      PCP No primary care provider on file.     CHIEF COMPLAINT: Altered mental status    HISTORY OF PRESENT ILLNESS:   This is a 63-year-old gentleman with insulin-dependent diabetes poorly controlled with history of noncompliance and history of leg wounds requiring right below the knee amputation.  Patient apparently had been previously living with one of his children who moved out and has been on his own now for several months.  He has not really been able to manage his own health care and presented to the ER yesterday with left lower extremity cellulitis, septic arthritis of the left knee bacteremia and profound hypoglycemia.  He developed respiratory muscle fatigue and required emergent intubation shortly after being admitted.  I was contacted by the nurse after the patient lateral to the intensive care unit so I did ask that the ER come up and intubate the patient since I had not met the patient or been contacted previously.    The patient is not on pressors but did suffer acute kidney injury superimposed on stage IIIb chronic kidney disease.  He will likely require dialysis at least short-term if not long-term.  I did speak with his son Barrie on the phone.  He is agreeable for me to place a dialysis catheter and to initiate dialysis later today.  Patient has already been seen by Dr. Pelayo.    He has also had the left knee drained earlier today which confirmed purulent secretions.  Plan is for arthroscopy of the left knee later today I am told.    Patient had decreased mental status but I suspect this was all related to hypoglycemia.  There was question of seizure which is entirely possible with 
  [facility]   IN-PATIENT SERVICE      NEUROLOGY CONSULT  NOTE            Date:   6/21/2024  Patient name:  Ramon Roger  Date of admission:  6/21/2024  YOB: 1960      Chief Complaint:     Chief Complaint   Patient presents with    Altered Mental Status       Reason for Consult:      [facility]    History of Present Illness:     63-year-old male with past medical history of diabetes, PAD, depression, peripheral polyneuropathy, status post right BKA, CKD, who was brought to the ED for altered mentation after he was found on the floor by family.  Neurology consulted for further evaluation.  History limited given patient's mentation.  Obtained primarily from primary team and chart review.    Patient was brought to the ED today after he was found by family on the floor, minimally responsive.  Appeared to be soiled in stool and urine.  He was last seen normal on Tuesday at his usual baseline although apparently wasn't feeling well.  In the ED, he was febrile, met sepsis criteria.  There was concern for hypoglycemia when he was found by EMS.  He was also noted to have AMANDA, UTI, rhabdomyolysis.  Per ED physician, he was initially confused but able to answer some questions.  He was then transferred to the ICU.  In the ICU, appears his mentation worsened. BG noted with be 21.  I was called to the bedside emergently.  Primary team had noticed that he was having generalized tremoring/shivering. Had fever 102.9.  He was given Ativan by primary team due to concern for possible seizure activity.  During my examination, he was obtunded, opened eyes briefly to noxious stimuli.  Intermittently, he was tracking, not following commands however.  Noted to have generalized shivering.  No clear seizure-like activity witnessed including gaze deviation or stereotypical motor movements.  He was subsequently intubated for airway protection.    Past Medical History:     Past Medical History:   Diagnosis Date    Adenomatous 
..  Palliative Care Inpatient Consult    NAME:  Ramon Roger  MEDICAL RECORD NUMBER:  [id number]  AGE: 63 y.o.   GENDER: male  : 1960  TODAY'S DATE:  2024    Reasons for Consultation:    Symptom and/or pain management  Provision of information regarding PC and/or hospice philosophies  Complex, time-intensive communication and interdisciplinary psychosocial support  Clarification of goals of care and/or assistance with difficult decision-making  Guidance in regards to resources and transition(s)    Members of PC team contributing to this consultation are : Avril Osorio, Palliative Care APRN-CNP    Plan      Palliative Interaction: I went to see patient and spoke to RN. He is on 35% Fi02 and pep 8. He is sedated on propofol grimacing to pain per nursing. RN reports HD on  and likely today. Patient initially did not have any UO but now has urine in pratt bag. She reports Neuro eventually wants MRI and EEG. She reports no more tremors or thoughts of seizures. She reports likely partial foot amputation being planned. No family was present.     I reviewed HCPOA in chart. Patient also has a LW. He has Barrie (son) named as primary agent. I reached out to Barrie and explained my role to him. He reports wanting to add ex-wife d/t being nurse and other children to HIPAA contact list so this was done per request.     I discussed patients chronic history and he reports that patient was independent with prosthetic. He reports that he was going to wound care and being compliant with his treatments. He reports he knew the risk of infection/amputation.     I discussed hospitalized problems and POA/son reports knowing that he will likely need partial amputation of foot but reports another doctor mentioning AKA so would like them to communicate as a team. He reports that he knows patient is on HD and is hoping this is not long term but understands sometimes it takes sometime to see.     I discussed goals and he 
[facility]  Consult Note      NAME:  Ramon Roger  MEDICAL RECORD NUMBER:  [id number]  AGE: 63 y.o.   GENDER: male  : 1960  TODAY'S DATE:  2024    [facility] nurse consult for left foot wound.  Podiatry and vascular both following with recommendation for transmetatarsal amputation to salvage leg.  Will defer treatment to them and sign off at this time.  Please reconsult if any other wound care needs arise.     Inge Byrd, DEREKN, RN, CWOCN, [facility]  Wound, Ostomy, and Continence Nursing  [phone]          
knee joint (HCC)  Resolved Problems:    * No resolved hospital problems. *  Left knee aspirated approximately 80 cc of wilmer pus sent for aerobic and anaerobic cultures    Plan on arthroscopic I&D left knee later today      Electronically signed by Reji Duenas MD on 6/22/2024 at 11:59 AM    Rounding Hospitalist  
  WBC 21.9* 14.0*   HGB 15.1 12.2*    110*     BMP:    Recent Labs     06/21/24  1120 06/21/24  2018 06/22/24  0425     --  137   K 4.5  --  4.8   CL 95*  --  105   CO2 20  --  16*   BUN 78*  --  90*   CREATININE 2.8* 3.4* 4.2*   GLUCOSE 153*  --  72     Lab Results   Component Value Date/Time    NITRU POSITIVE 06/21/2024 01:17 PM    COLORU Dark Yellow 06/21/2024 01:17 PM    PHUR 5.5 06/21/2024 01:17 PM    PHUR 5.5 05/11/2020 12:53 AM    WBCUA 0 TO 2 06/21/2024 01:17 PM    RBCUA 10 TO 20 06/21/2024 01:17 PM    MUCUS NOT REPORTED 05/11/2020 12:53 AM    TRICHOMONAS NOT REPORTED 05/11/2020 12:53 AM    YEAST NOT REPORTED 05/11/2020 12:53 AM    BACTERIA FEW 06/21/2024 01:17 PM    LEUKOCYTESUR SMALL 06/21/2024 01:17 PM    UROBILINOGEN Normal 06/21/2024 01:17 PM    BILIRUBINUR NEGATIVE  Verified by ictotest. 06/21/2024 01:17 PM    GLUCOSEU MOD 06/21/2024 01:17 PM    KETUA NEGATIVE 06/21/2024 01:17 PM    AMORPHOUS NOT REPORTED 05/11/2020 12:53 AM     IMPRESSION/RECOMMENDATIONS:      1.  Acute kidney injury - secondary to toxic acute tubular necrosis complicating rhabdomyolysis.  Baseline serum creatinine was 1.06 mg/dL on 5/31/2024.  Obstructive uropathy is a differential but bladder scan shows less than 100 mL of urine.  Patient is oligo-anuric and is at increasing risk for uremic complications.  I discussed with patient's son Barrie over the phone and he gave consent for acute hemodialysis after explaining indications for, benefits and possible complications of dialysis.  Plan: Hepatitis B and C serologies.  Isotonic bicarbonate drip at 100 mL/h.  Renal ultrasound to rule out hydronephrosis.  Urinalysis and urine microscopy.  Random urine electrolytes.  KAYLIE and complements.  Plasma free light chain with ratio.  Monitor urine output closely.  Acute hemodialysis today for 2 hours.  Basic metabolic profile daily.  Check CPK levels daily.    2.  Anion gap metabolic acidosis - secondary to uremia and lactic 
   POC Glucose 64 (L) 75 - 110 mg/dL   POC Glucose Fingerstick   Result Value Ref Range    POC Glucose 96 75 - 110 mg/dL   POC Glucose Fingerstick   Result Value Ref Range    POC Glucose 82 75 - 110 mg/dL   POC Glucose Fingerstick   Result Value Ref Range    POC Glucose 52 (L) 75 - 110 mg/dL   POC Glucose Fingerstick   Result Value Ref Range    POC Glucose 88 75 - 110 mg/dL   POC Glucose Fingerstick   Result Value Ref Range    POC Glucose 78 75 - 110 mg/dL   POC Glucose Fingerstick   Result Value Ref Range    POC Glucose 73 (L) 75 - 110 mg/dL   POC Glucose Fingerstick   Result Value Ref Range    POC Glucose 55 (L) 75 - 110 mg/dL   POC Glucose Fingerstick   Result Value Ref Range    POC Glucose 87 75 - 110 mg/dL   POC Glucose Fingerstick   Result Value Ref Range    POC Glucose 72 (L) 75 - 110 mg/dL   POC Glucose Fingerstick   Result Value Ref Range    POC Glucose 74 (L) 75 - 110 mg/dL   POC Glucose Fingerstick   Result Value Ref Range    POC Glucose 62 (L) 75 - 110 mg/dL   POC Glucose Fingerstick   Result Value Ref Range    POC Glucose 88 75 - 110 mg/dL   Vascular duplex lower extremity venous left   Result Value Ref Range    Body Surface Area 2.22 m2         IMAGING DATA:    XR ABDOMEN (KUB) (SINGLE AP VIEW)    Result Date: 6/23/2024  EXAMINATION: ONE SUPINE XRAY VIEW(S) OF THE ABDOMEN 6/23/2024 1:42 pm COMPARISON: None. HISTORY: ORDERING SYSTEM PROVIDED HISTORY: abdominal distension TECHNOLOGIST PROVIDED HISTORY: abdominal distension Reason for Exam: abdominal distension FINDINGS: There is a nonobstructive bowel gas pattern.  There is no free air.  There are no suspicious calcifications.  There is a gastric tube with the tip in the gastric body.  There is no acute osseous abnormality.  The surrounding soft tissues are unremarkable.     Nonobstructive bowel gas pattern. Gastric tube with the tip in the mid gastric body.     XR CHEST (SINGLE VIEW FRONTAL)    Result Date: 6/23/2024  EXAMINATION: ONE XRAY VIEW OF THE

## 2024-06-24 NOTE — CARE COORDINATION
At this time, p[patient meets LTAC criteria for Wound Care.    Electronically signed by Emma Roberson RN on 6/24/2024 at 11:47 AM

## 2024-06-24 NOTE — CARE COORDINATION
ONGOING DISCHARGE PLAN:    Patient is sedated on VENT 35%    Spoke with patient's son regarding discharge plan and discussed rehab. Patient has been to [facility].    POD #2 Left Knee-JAY drain, I&D    ID consult-Group A streptococcus bacteremia   IV zosyn     Podiatry consult-TMA   MRI left foot-deep tissue ulcer, osteo 1st proximal phalanx, cellulitis    Vascular consult  Vascular arterial studies    Nephrology consult-acute HD 6/22, 6/24  Renal US-unremarkable   6/22 Alex Catheter    Neurology- EEG pending    Echo EF 50%    Palliative Care     Will continue to follow for additional discharge needs.    If patient is discharged prior to next notation, then this note serves as note for discharge by case management.    Electronically signed by Stacy Xiong RN on 6/24/2024 at 2:32 PM

## 2024-06-24 NOTE — OP NOTE
[place] [address]                            OPERATIVE REPORT      PATIENT NAME: SAMINA JEFFERSON             : 1960  MED REC NO: [id number]                          ROOM:   ACCOUNT NO: [id number]                       ADMIT DATE: 2024  PROVIDER: Reji Duenas MD      DATE OF PROCEDURE:  2024    SURGEON:  Reji Duenas MD    PREOPERATIVE DIAGNOSIS:  Septic arthritis, left knee.    POSTOPERATIVE DIAGNOSIS:  Septic arthritis, left knee.    ASSISTANT:  None.    ANESTHESIA:  General.    BLOOD LOSS:  Minimal.    COMPLICATIONS:  None.    SPECIMEN:  None.    IMPLANTS:  None.    DRAINS:  7 mm JAY placed arthroscopically, secured with a single silk suture.    FINDINGS:    1. Wilmer pus on introduction of cannula.  2. Drain placed post completion of procedure.  3. Patient with end-stage degenerative arthritis of the knee.  4. Minimal visualization of the lateral compartment in part.  The patient appeared to bring a little bit more pus into the arthroscopic fluid every time I went over to look at it obscuring view.    DESCRIPTION OF PROCEDURE:  The patient was taken to the operating room, placed under general anesthesia.  Left knee was prepped and draped in the usual sterile fashion.    Prior to prepping the knee, the dressing on the foot was taken down.  As the patient was asleep, this was inspected.  It actually looked quite good with not great granulation, but good granulation tissue throughout.  No signs of wilmer pus.    Knee was prepped and draped with Betadine in the usual sterile fashion.    Stab incisions for lateral and medial portal were established.    On introduction of the arthroscopic cannula, wilmer pus, roughly 15 mL, came out.    At this juncture, the scope was inserted, and the knee was sequentially irrigated through, I believe, almost 10 L of fluid with some minimal shaving of the synovium to aid in

## 2024-06-24 NOTE — ACP (ADVANCE CARE PLANNING)
Advance Care Planning     Advance Care Planning (ACP) Physician/NP/PA Conversation    Date of Conversation: 6/21/2024  Conducted with: Healthcare Decision Maker    Healthcare Decision Maker:      Primary Decision Maker: Barrie Roger - [place] - [phone]    Click here to complete Healthcare Decision Makers including selection of the Healthcare Decision Maker Relationship (ie \"Primary\")  Today we documented Decision Maker(s) consistent with ACP documents on file.    Care Preferences:    Hospitalization:  \"If your health worsens and it becomes clear that your chance of recovery is unlikely, what would be your preference regarding hospitalization?\"  The patient would prefer hospitalization.    Ventilation:  \"If you were unable to breath on your own and your chance of recovery was unlikely, what would be your preference about the use of a ventilator (breathing machine) if it was available to you?\"  The patient would desire the use of a ventilator.    Resuscitation:  \"In the event your heart stopped as a result of an underlying serious health condition, would you want attempts made to restart your heart, or would you prefer a natural death?\"  No, do NOT attempt to resuscitate.    treatment goals, benefit/burden of treatment options, ventilation preferences, hospitalization preferences, and resuscitation preferences    Conversation Outcomes / Follow-Up Plan:  ACP complete - no further action today  Reviewed DNR/DNI and patient elects DNR order - completed portable DNR form & placed order    Length of Voluntary ACP Conversation in minutes:  <16 minutes (Non-Billable)    DEVIN BAL - CNP

## 2024-06-25 ENCOUNTER — APPOINTMENT (OUTPATIENT)
Dept: GENERAL RADIOLOGY | Age: 64
DRG: 870 | End: 2024-06-25
Payer: MEDICARE

## 2024-06-25 PROBLEM — G93.41 ACUTE METABOLIC ENCEPHALOPATHY: Status: ACTIVE | Noted: 2024-06-25

## 2024-06-25 LAB
ABSOLUTE BANDS: 0.99 K/UL (ref 0–1)
ANION GAP SERPL CALCULATED.3IONS-SCNC: 14 MMOL/L (ref 9–17)
ARTERIAL PATENCY WRIST A: ABNORMAL
BANDS: 8 % (ref 0–10)
BASOPHILS # BLD: 0 K/UL (ref 0–0.2)
BASOPHILS NFR BLD: 0 % (ref 0–2)
BDY SITE: ABNORMAL
BUN SERPL-MCNC: 59 MG/DL (ref 8–23)
CALCIUM SERPL-MCNC: 7.5 MG/DL (ref 8.6–10.4)
CHLORIDE SERPL-SCNC: 104 MMOL/L (ref 98–107)
CK SERPL-CCNC: 258 U/L (ref 39–308)
CO2 SERPL-SCNC: 22 MMOL/L (ref 20–31)
COHGB MFR BLD: 1.3 % (ref 0–5)
CREAT SERPL-MCNC: 3.4 MG/DL (ref 0.7–1.2)
CRP SERPL HS-MCNC: 236.6 MG/L (ref 0–5)
EOSINOPHIL # BLD: 0.12 K/UL (ref 0–0.4)
EOSINOPHILS RELATIVE PERCENT: 1 % (ref 0–4)
ERYTHROCYTE [DISTWIDTH] IN BLOOD BY AUTOMATED COUNT: 17.4 % (ref 11.5–14.9)
ERYTHROCYTE [SEDIMENTATION RATE] IN BLOOD BY PHOTOMETRIC METHOD: 88 MM/HR (ref 0–20)
FIO2 ON VENT: 30 %
GAS FLOW.O2 O2 DELIVERY SYS: ABNORMAL L/MIN
GAS FLOW.O2 SETTING OXYMISER: 28 L/MIN
GFR, ESTIMATED: 19 ML/MIN/1.73M2
GLUCOSE BLD-MCNC: 116 MG/DL (ref 75–110)
GLUCOSE BLD-MCNC: 129 MG/DL (ref 75–110)
GLUCOSE BLD-MCNC: 154 MG/DL (ref 75–110)
GLUCOSE BLD-MCNC: 167 MG/DL (ref 75–110)
GLUCOSE SERPL-MCNC: 138 MG/DL (ref 70–99)
HCO3 ARTERIAL: 25.4 MMOL/L (ref 22–26)
HCT VFR BLD AUTO: 33.3 % (ref 41–53)
HGB BLD-MCNC: 10.7 G/DL (ref 13.5–17.5)
LYMPHOCYTES NFR BLD: 2.11 K/UL (ref 1–4.8)
LYMPHOCYTES RELATIVE PERCENT: 17 % (ref 24–44)
MCH RBC QN AUTO: 26.9 PG (ref 26–34)
MCHC RBC AUTO-ENTMCNC: 32.1 G/DL (ref 31–37)
MCV RBC AUTO: 83.8 FL (ref 80–100)
METAMYELOCYTES ABSOLUTE COUNT: 0.12 K/UL
METAMYELOCYTES: 1 %
METHEMOGLOBIN: 1.3 % (ref 0–1.9)
MONOCYTES NFR BLD: 0.62 K/UL (ref 0.1–1.3)
MONOCYTES NFR BLD: 5 % (ref 1–7)
MORPHOLOGY: ABNORMAL
NEUTROPHILS NFR BLD: 68 % (ref 36–66)
NEUTS SEG NFR BLD: 8.44 K/UL (ref 1.3–9.1)
O2 SAT, ARTERIAL: 96.6 % (ref 95–98)
PCO2 ARTERIAL: 37.4 MMHG (ref 35–45)
PEEP RESPIRATORY: 8 CM[H2O]
PH ARTERIAL: 7.44 (ref 7.35–7.45)
PLATELET # BLD AUTO: 74 K/UL (ref 150–450)
PMV BLD AUTO: 10.6 FL (ref 6–12)
PO2 ARTERIAL: 123 MMHG (ref 80–100)
POSITIVE BASE EXCESS, ART: 1.3 MMOL/L (ref 0–2)
POTASSIUM SERPL-SCNC: 3.8 MMOL/L (ref 3.7–5.3)
PT. POSITION: ABNORMAL
RBC # BLD AUTO: 3.98 M/UL (ref 4.5–5.9)
RESPIRATORY RATE: 32
SODIUM SERPL-SCNC: 140 MMOL/L (ref 135–144)
TEXT FOR RESPIRATORY: ABNORMAL
TOTAL RATE: 32
VENTILATION MODE VENT: ABNORMAL
VT: 500
WBC OTHER # BLD: 12.4 K/UL (ref 3.5–11)

## 2024-06-25 PROCEDURE — 2580000003 HC RX 258: Performed by: ORTHOPAEDIC SURGERY

## 2024-06-25 PROCEDURE — 99232 SBSQ HOSP IP/OBS MODERATE 35: CPT | Performed by: INTERNAL MEDICINE

## 2024-06-25 PROCEDURE — 82805 BLOOD GASES W/O2 SATURATION: CPT

## 2024-06-25 PROCEDURE — 6360000002 HC RX W HCPCS: Performed by: INTERNAL MEDICINE

## 2024-06-25 PROCEDURE — 85025 COMPLETE CBC W/AUTO DIFF WBC: CPT

## 2024-06-25 PROCEDURE — 87070 CULTURE OTHR SPECIMN AEROBIC: CPT

## 2024-06-25 PROCEDURE — 2580000003 HC RX 258: Performed by: INTERNAL MEDICINE

## 2024-06-25 PROCEDURE — 80048 BASIC METABOLIC PNL TOTAL CA: CPT

## 2024-06-25 PROCEDURE — 82550 ASSAY OF CK (CPK): CPT

## 2024-06-25 PROCEDURE — 99231 SBSQ HOSP IP/OBS SF/LOW 25: CPT | Performed by: PSYCHIATRY & NEUROLOGY

## 2024-06-25 PROCEDURE — 6360000002 HC RX W HCPCS: Performed by: ORTHOPAEDIC SURGERY

## 2024-06-25 PROCEDURE — 99233 SBSQ HOSP IP/OBS HIGH 50: CPT | Performed by: INTERNAL MEDICINE

## 2024-06-25 PROCEDURE — 11042 DBRDMT SUBQ TIS 1ST 20SQCM/<: CPT | Performed by: PODIATRIST

## 2024-06-25 PROCEDURE — 99231 SBSQ HOSP IP/OBS SF/LOW 25: CPT | Performed by: STUDENT IN AN ORGANIZED HEALTH CARE EDUCATION/TRAINING PROGRAM

## 2024-06-25 PROCEDURE — C9113 INJ PANTOPRAZOLE SODIUM, VIA: HCPCS | Performed by: INTERNAL MEDICINE

## 2024-06-25 PROCEDURE — 2500000003 HC RX 250 WO HCPCS

## 2024-06-25 PROCEDURE — 36415 COLL VENOUS BLD VENIPUNCTURE: CPT

## 2024-06-25 PROCEDURE — 36600 WITHDRAWAL OF ARTERIAL BLOOD: CPT

## 2024-06-25 PROCEDURE — 85652 RBC SED RATE AUTOMATED: CPT

## 2024-06-25 PROCEDURE — 99024 POSTOP FOLLOW-UP VISIT: CPT | Performed by: ORTHOPAEDIC SURGERY

## 2024-06-25 PROCEDURE — 87205 SMEAR GRAM STAIN: CPT

## 2024-06-25 PROCEDURE — 86140 C-REACTIVE PROTEIN: CPT

## 2024-06-25 PROCEDURE — 2500000003 HC RX 250 WO HCPCS: Performed by: ORTHOPAEDIC SURGERY

## 2024-06-25 PROCEDURE — 82947 ASSAY GLUCOSE BLOOD QUANT: CPT

## 2024-06-25 PROCEDURE — 71045 X-RAY EXAM CHEST 1 VIEW: CPT

## 2024-06-25 PROCEDURE — 2000000000 HC ICU R&B

## 2024-06-25 RX ORDER — METOPROLOL TARTRATE 1 MG/ML
5 INJECTION, SOLUTION INTRAVENOUS EVERY 4 HOURS
Status: DISCONTINUED | OUTPATIENT
Start: 2024-06-25 | End: 2024-06-27

## 2024-06-25 RX ORDER — HYDRALAZINE HYDROCHLORIDE 20 MG/ML
10 INJECTION INTRAMUSCULAR; INTRAVENOUS ONCE
Status: COMPLETED | OUTPATIENT
Start: 2024-06-25 | End: 2024-06-26

## 2024-06-25 RX ORDER — BISACODYL 10 MG
10 SUPPOSITORY, RECTAL RECTAL DAILY PRN
Status: DISCONTINUED | OUTPATIENT
Start: 2024-06-25 | End: 2024-07-01 | Stop reason: HOSPADM

## 2024-06-25 RX ADMIN — SODIUM CHLORIDE, PRESERVATIVE FREE 10 ML: 5 INJECTION INTRAVENOUS at 08:28

## 2024-06-25 RX ADMIN — HEPARIN SODIUM 5000 UNITS: 5000 INJECTION INTRAVENOUS; SUBCUTANEOUS at 20:47

## 2024-06-25 RX ADMIN — DEXTROSE AND SODIUM CHLORIDE: 5; 900 INJECTION, SOLUTION INTRAVENOUS at 20:43

## 2024-06-25 RX ADMIN — SODIUM CHLORIDE, PRESERVATIVE FREE 40 MG: 5 INJECTION INTRAVENOUS at 08:19

## 2024-06-25 RX ADMIN — METOPROLOL TARTRATE 5 MG: 1 INJECTION, SOLUTION INTRAVENOUS at 13:10

## 2024-06-25 RX ADMIN — METOPROLOL TARTRATE 5 MG: 1 INJECTION, SOLUTION INTRAVENOUS at 18:03

## 2024-06-25 RX ADMIN — PIPERACILLIN AND TAZOBACTAM 3375 MG: 3; .375 INJECTION, POWDER, LYOPHILIZED, FOR SOLUTION INTRAVENOUS at 04:10

## 2024-06-25 RX ADMIN — FENTANYL CITRATE 50 MCG: 0.05 INJECTION, SOLUTION INTRAMUSCULAR; INTRAVENOUS at 20:45

## 2024-06-25 RX ADMIN — DEXTROSE AND SODIUM CHLORIDE: 5; 900 INJECTION, SOLUTION INTRAVENOUS at 08:37

## 2024-06-25 RX ADMIN — HEPARIN SODIUM 5000 UNITS: 5000 INJECTION INTRAVENOUS; SUBCUTANEOUS at 13:10

## 2024-06-25 RX ADMIN — PIPERACILLIN AND TAZOBACTAM 3375 MG: 3; .375 INJECTION, POWDER, LYOPHILIZED, FOR SOLUTION INTRAVENOUS at 16:23

## 2024-06-25 RX ADMIN — MIDAZOLAM HYDROCHLORIDE 4 MG/HR: 5 INJECTION, SOLUTION INTRAMUSCULAR; INTRAVENOUS at 08:31

## 2024-06-25 RX ADMIN — METOPROLOL TARTRATE 5 MG: 1 INJECTION, SOLUTION INTRAVENOUS at 22:02

## 2024-06-25 ASSESSMENT — PULMONARY FUNCTION TESTS
PIF_VALUE: 27
PIF_VALUE: 26
PIF_VALUE: 27
PIF_VALUE: 25
PIF_VALUE: 25
PIF_VALUE: 26
PIF_VALUE: 35
PIF_VALUE: 26
PIF_VALUE: 27
PIF_VALUE: 27
PIF_VALUE: 25
PIF_VALUE: 29
PIF_VALUE: 28
PIF_VALUE: 24
PIF_VALUE: 26
PIF_VALUE: 29
PIF_VALUE: 26
PIF_VALUE: 24
PIF_VALUE: 27
PIF_VALUE: 26
PIF_VALUE: 24
PIF_VALUE: 29
PIF_VALUE: 26
PIF_VALUE: 25
PIF_VALUE: 31
PIF_VALUE: 28
PIF_VALUE: 26

## 2024-06-25 NOTE — CARE COORDINATION
ONGOING DISCHARGE PLAN:    VENT 30%    Spoke with patient's son regarding discharge plan and discussed rehab.     POD #3 Left Knee-JAY drain, I&D    ID consult-Group A streptococcus bacteremia   IV zosyn     Podiatry consult-TMA   MRI left foot-deep tissue ulcer, osteo 1st proximal phalanx, cellulitis    Vascular consult  Vascular arterial studies-normal JACKIE, no vascular intervention    Nephrology consult-acute HD 6/22, 6/24  Renal US-unremarkable   6/22 Alex Catheter    Neurology- EEG pending    Echo EF 50%    Palliative Care     Will continue to follow for additional discharge needs.    If patient is discharged prior to next notation, then this note serves as note for discharge by case management.    Electronically signed by Stacy Xiong RN on 6/25/2024 at 4:33 PM

## 2024-06-26 ENCOUNTER — APPOINTMENT (OUTPATIENT)
Dept: GENERAL RADIOLOGY | Age: 64
DRG: 870 | End: 2024-06-26
Payer: MEDICARE

## 2024-06-26 LAB
ABSOLUTE BANDS: 0.89 K/UL (ref 0–1)
ANION GAP SERPL CALCULATED.3IONS-SCNC: 13 MMOL/L (ref 9–17)
ARTERIAL PATENCY WRIST A: ABNORMAL
BANDS: 6 % (ref 0–10)
BASOPHILS # BLD: 0 K/UL (ref 0–0.2)
BASOPHILS NFR BLD: 0 % (ref 0–2)
BDY SITE: ABNORMAL
BODY TEMPERATURE: 37
BUN SERPL-MCNC: 78 MG/DL (ref 8–23)
CALCIUM SERPL-MCNC: 7.7 MG/DL (ref 8.6–10.4)
CHLORIDE SERPL-SCNC: 109 MMOL/L (ref 98–107)
CO2 SERPL-SCNC: 21 MMOL/L (ref 20–31)
COHGB MFR BLD: 1.2 % (ref 0–5)
CREAT SERPL-MCNC: 4 MG/DL (ref 0.7–1.2)
CRP SERPL HS-MCNC: 129.1 MG/L (ref 0–5)
DATE LAST DOSE: NORMAL
EKG ATRIAL RATE: 124 BPM
EKG P AXIS: 48 DEGREES
EKG P-R INTERVAL: 142 MS
EKG Q-T INTERVAL: 300 MS
EKG QRS DURATION: 72 MS
EKG QTC CALCULATION (BAZETT): 431 MS
EKG R AXIS: -15 DEGREES
EKG T AXIS: 76 DEGREES
EKG VENTRICULAR RATE: 124 BPM
EOSINOPHIL # BLD: 0.15 K/UL (ref 0–0.4)
EOSINOPHILS RELATIVE PERCENT: 1 % (ref 0–4)
ERYTHROCYTE [DISTWIDTH] IN BLOOD BY AUTOMATED COUNT: 17.2 % (ref 11.5–14.9)
ERYTHROCYTE [SEDIMENTATION RATE] IN BLOOD BY PHOTOMETRIC METHOD: 92 MM/HR (ref 0–20)
FIO2 ON VENT: 30 %
FREE KAPPA/LAMBDA RATIO: 0.71 (ref 0.22–1.74)
GAS FLOW.O2 O2 DELIVERY SYS: ABNORMAL L/MIN
GAS FLOW.O2 SETTING OXYMISER: 28 L/MIN
GFR, ESTIMATED: 16 ML/MIN/1.73M2
GLUCOSE BLD-MCNC: 250 MG/DL (ref 75–110)
GLUCOSE BLD-MCNC: 264 MG/DL (ref 75–110)
GLUCOSE BLD-MCNC: 270 MG/DL (ref 75–110)
GLUCOSE BLD-MCNC: 280 MG/DL (ref 75–110)
GLUCOSE BLD-MCNC: 296 MG/DL (ref 75–110)
GLUCOSE SERPL-MCNC: 328 MG/DL (ref 70–99)
HCO3 ARTERIAL: 24.1 MMOL/L (ref 22–26)
HCT VFR BLD AUTO: 34.1 % (ref 41–53)
HGB BLD-MCNC: 10.9 G/DL (ref 13.5–17.5)
KAPPA LC FREE SER-MCNC: 376 MG/L
LAMBDA LC FREE SERPL-MCNC: 528 MG/L (ref 4.2–27.7)
LYMPHOCYTES NFR BLD: 1.92 K/UL (ref 1–4.8)
LYMPHOCYTES RELATIVE PERCENT: 13 % (ref 24–44)
MCH RBC QN AUTO: 26.8 PG (ref 26–34)
MCHC RBC AUTO-ENTMCNC: 31.9 G/DL (ref 31–37)
MCV RBC AUTO: 83.8 FL (ref 80–100)
METAMYELOCYTES ABSOLUTE COUNT: 0.15 K/UL
METAMYELOCYTES: 1 %
METHEMOGLOBIN: 1.3 % (ref 0–1.9)
MONOCYTES NFR BLD: 0.89 K/UL (ref 0.1–1.3)
MONOCYTES NFR BLD: 6 % (ref 1–7)
MORPHOLOGY: ABNORMAL
NEGATIVE BASE EXCESS, ART: 0.9 MMOL/L (ref 0–2)
NEUTROPHILS NFR BLD: 73 % (ref 36–66)
NEUTS SEG NFR BLD: 10.8 K/UL (ref 1.3–9.1)
O2 SAT, ARTERIAL: 96.6 % (ref 95–98)
PCO2 ARTERIAL: 40.1 MMHG (ref 35–45)
PEEP RESPIRATORY: 8 CM[H2O]
PH ARTERIAL: 7.39 (ref 7.35–7.45)
PLATELET # BLD AUTO: 103 K/UL (ref 150–450)
PMV BLD AUTO: 9.2 FL (ref 6–12)
PO2 ARTERIAL: 134 MMHG (ref 80–100)
POTASSIUM SERPL-SCNC: 3.9 MMOL/L (ref 3.7–5.3)
PT. POSITION: ABNORMAL
RBC # BLD AUTO: 4.07 M/UL (ref 4.5–5.9)
RESPIRATORY RATE: 30
SODIUM SERPL-SCNC: 143 MMOL/L (ref 135–144)
TEXT FOR RESPIRATORY: ABNORMAL
TME LAST DOSE: 1309 H
TOTAL RATE: 30
VANCOMYCIN DOSE: NORMAL MG
VANCOMYCIN SERPL-MCNC: 8.3 UG/ML
VENTILATION MODE VENT: ABNORMAL
VT: 500
WBC OTHER # BLD: 14.8 K/UL (ref 3.5–11)

## 2024-06-26 PROCEDURE — 2500000003 HC RX 250 WO HCPCS: Performed by: INTERNAL MEDICINE

## 2024-06-26 PROCEDURE — 84166 PROTEIN E-PHORESIS/URINE/CSF: CPT

## 2024-06-26 PROCEDURE — 2580000003 HC RX 258: Performed by: INTERNAL MEDICINE

## 2024-06-26 PROCEDURE — 73560 X-RAY EXAM OF KNEE 1 OR 2: CPT

## 2024-06-26 PROCEDURE — 99233 SBSQ HOSP IP/OBS HIGH 50: CPT | Performed by: INTERNAL MEDICINE

## 2024-06-26 PROCEDURE — 86334 IMMUNOFIX E-PHORESIS SERUM: CPT

## 2024-06-26 PROCEDURE — 82805 BLOOD GASES W/O2 SATURATION: CPT

## 2024-06-26 PROCEDURE — 6360000002 HC RX W HCPCS: Performed by: ORTHOPAEDIC SURGERY

## 2024-06-26 PROCEDURE — 84165 PROTEIN E-PHORESIS SERUM: CPT

## 2024-06-26 PROCEDURE — 2580000003 HC RX 258: Performed by: NURSE PRACTITIONER

## 2024-06-26 PROCEDURE — 86335 IMMUNFIX E-PHORSIS/URINE/CSF: CPT

## 2024-06-26 PROCEDURE — 80202 ASSAY OF VANCOMYCIN: CPT

## 2024-06-26 PROCEDURE — 6360000002 HC RX W HCPCS: Performed by: INTERNAL MEDICINE

## 2024-06-26 PROCEDURE — 2580000003 HC RX 258: Performed by: ORTHOPAEDIC SURGERY

## 2024-06-26 PROCEDURE — 99232 SBSQ HOSP IP/OBS MODERATE 35: CPT | Performed by: INTERNAL MEDICINE

## 2024-06-26 PROCEDURE — 6360000002 HC RX W HCPCS

## 2024-06-26 PROCEDURE — 36600 WITHDRAWAL OF ARTERIAL BLOOD: CPT

## 2024-06-26 PROCEDURE — 86140 C-REACTIVE PROTEIN: CPT

## 2024-06-26 PROCEDURE — 85025 COMPLETE CBC W/AUTO DIFF WBC: CPT

## 2024-06-26 PROCEDURE — 2000000000 HC ICU R&B

## 2024-06-26 PROCEDURE — 36415 COLL VENOUS BLD VENIPUNCTURE: CPT

## 2024-06-26 PROCEDURE — 6370000000 HC RX 637 (ALT 250 FOR IP)

## 2024-06-26 PROCEDURE — 83521 IG LIGHT CHAINS FREE EACH: CPT

## 2024-06-26 PROCEDURE — 6360000002 HC RX W HCPCS: Performed by: NURSE PRACTITIONER

## 2024-06-26 PROCEDURE — 71045 X-RAY EXAM CHEST 1 VIEW: CPT

## 2024-06-26 PROCEDURE — 94003 VENT MGMT INPAT SUBQ DAY: CPT

## 2024-06-26 PROCEDURE — 82947 ASSAY GLUCOSE BLOOD QUANT: CPT

## 2024-06-26 PROCEDURE — 2500000003 HC RX 250 WO HCPCS

## 2024-06-26 PROCEDURE — C9113 INJ PANTOPRAZOLE SODIUM, VIA: HCPCS | Performed by: INTERNAL MEDICINE

## 2024-06-26 PROCEDURE — 85652 RBC SED RATE AUTOMATED: CPT

## 2024-06-26 PROCEDURE — 80048 BASIC METABOLIC PNL TOTAL CA: CPT

## 2024-06-26 PROCEDURE — 84155 ASSAY OF PROTEIN SERUM: CPT

## 2024-06-26 PROCEDURE — 84156 ASSAY OF PROTEIN URINE: CPT

## 2024-06-26 PROCEDURE — 6370000000 HC RX 637 (ALT 250 FOR IP): Performed by: ORTHOPAEDIC SURGERY

## 2024-06-26 PROCEDURE — 90935 HEMODIALYSIS ONE EVALUATION: CPT

## 2024-06-26 RX ORDER — INSULIN LISPRO 100 [IU]/ML
0-4 INJECTION, SOLUTION INTRAVENOUS; SUBCUTANEOUS
Status: DISCONTINUED | OUTPATIENT
Start: 2024-06-26 | End: 2024-06-27

## 2024-06-26 RX ORDER — LABETALOL HYDROCHLORIDE 5 MG/ML
20 INJECTION, SOLUTION INTRAVENOUS ONCE
Status: COMPLETED | OUTPATIENT
Start: 2024-06-26 | End: 2024-06-26

## 2024-06-26 RX ORDER — LINEZOLID 2 MG/ML
600 INJECTION, SOLUTION INTRAVENOUS EVERY 12 HOURS
Status: DISCONTINUED | OUTPATIENT
Start: 2024-06-26 | End: 2024-06-26

## 2024-06-26 RX ORDER — SODIUM CHLORIDE 9 MG/ML
INJECTION, SOLUTION INTRAVENOUS CONTINUOUS
Status: DISCONTINUED | OUTPATIENT
Start: 2024-06-26 | End: 2024-07-01 | Stop reason: HOSPADM

## 2024-06-26 RX ORDER — INSULIN LISPRO 100 [IU]/ML
0-4 INJECTION, SOLUTION INTRAVENOUS; SUBCUTANEOUS NIGHTLY
Status: DISCONTINUED | OUTPATIENT
Start: 2024-06-26 | End: 2024-06-27

## 2024-06-26 RX ADMIN — METOPROLOL TARTRATE 5 MG: 1 INJECTION, SOLUTION INTRAVENOUS at 14:13

## 2024-06-26 RX ADMIN — Medication 1.2 ML: at 13:40

## 2024-06-26 RX ADMIN — HEPARIN SODIUM 5000 UNITS: 5000 INJECTION INTRAVENOUS; SUBCUTANEOUS at 05:56

## 2024-06-26 RX ADMIN — SODIUM CHLORIDE, PRESERVATIVE FREE 10 ML: 5 INJECTION INTRAVENOUS at 07:48

## 2024-06-26 RX ADMIN — SERTRALINE HYDROCHLORIDE 150 MG: 100 TABLET ORAL at 07:46

## 2024-06-26 RX ADMIN — INSULIN LISPRO 2 UNITS: 100 INJECTION, SOLUTION INTRAVENOUS; SUBCUTANEOUS at 05:54

## 2024-06-26 RX ADMIN — ACETAMINOPHEN 650 MG: 325 TABLET ORAL at 08:03

## 2024-06-26 RX ADMIN — LABETALOL HYDROCHLORIDE 20 MG: 5 INJECTION, SOLUTION INTRAVENOUS at 22:06

## 2024-06-26 RX ADMIN — SODIUM CHLORIDE, PRESERVATIVE FREE 10 ML: 5 INJECTION INTRAVENOUS at 07:49

## 2024-06-26 RX ADMIN — HYDRALAZINE HYDROCHLORIDE 100 MG: 50 TABLET ORAL at 14:13

## 2024-06-26 RX ADMIN — METOPROLOL TARTRATE 5 MG: 1 INJECTION, SOLUTION INTRAVENOUS at 17:53

## 2024-06-26 RX ADMIN — HYDRALAZINE HYDROCHLORIDE 100 MG: 50 TABLET ORAL at 20:10

## 2024-06-26 RX ADMIN — PIPERACILLIN AND TAZOBACTAM 3375 MG: 3; .375 INJECTION, POWDER, LYOPHILIZED, FOR SOLUTION INTRAVENOUS at 16:17

## 2024-06-26 RX ADMIN — METOPROLOL TARTRATE 5 MG: 1 INJECTION, SOLUTION INTRAVENOUS at 20:19

## 2024-06-26 RX ADMIN — INSULIN LISPRO 2 UNITS: 100 INJECTION, SOLUTION INTRAVENOUS; SUBCUTANEOUS at 16:14

## 2024-06-26 RX ADMIN — FENTANYL CITRATE 50 MCG: 0.05 INJECTION, SOLUTION INTRAMUSCULAR; INTRAVENOUS at 17:53

## 2024-06-26 RX ADMIN — METOPROLOL TARTRATE 5 MG: 1 INJECTION, SOLUTION INTRAVENOUS at 06:04

## 2024-06-26 RX ADMIN — INSULIN LISPRO 2 UNITS: 100 INJECTION, SOLUTION INTRAVENOUS; SUBCUTANEOUS at 08:04

## 2024-06-26 RX ADMIN — HYDRALAZINE HYDROCHLORIDE 100 MG: 50 TABLET ORAL at 07:46

## 2024-06-26 RX ADMIN — INSULIN LISPRO 2 UNITS: 100 INJECTION, SOLUTION INTRAVENOUS; SUBCUTANEOUS at 11:31

## 2024-06-26 RX ADMIN — HEPARIN SODIUM 5000 UNITS: 5000 INJECTION INTRAVENOUS; SUBCUTANEOUS at 14:13

## 2024-06-26 RX ADMIN — SODIUM CHLORIDE, PRESERVATIVE FREE 40 MG: 5 INJECTION INTRAVENOUS at 07:46

## 2024-06-26 RX ADMIN — PIPERACILLIN AND TAZOBACTAM 3375 MG: 3; .375 INJECTION, POWDER, LYOPHILIZED, FOR SOLUTION INTRAVENOUS at 05:49

## 2024-06-26 RX ADMIN — FENTANYL CITRATE 50 MCG: 0.05 INJECTION, SOLUTION INTRAMUSCULAR; INTRAVENOUS at 14:33

## 2024-06-26 RX ADMIN — METOPROLOL TARTRATE 5 MG: 1 INJECTION, SOLUTION INTRAVENOUS at 01:58

## 2024-06-26 RX ADMIN — HYDRALAZINE HYDROCHLORIDE 10 MG: 20 INJECTION INTRAMUSCULAR; INTRAVENOUS at 00:03

## 2024-06-26 RX ADMIN — Medication 1.2 ML: at 13:41

## 2024-06-26 RX ADMIN — HEPARIN SODIUM 5000 UNITS: 5000 INJECTION INTRAVENOUS; SUBCUTANEOUS at 20:09

## 2024-06-26 RX ADMIN — SODIUM CHLORIDE: 9 INJECTION, SOLUTION INTRAVENOUS at 06:10

## 2024-06-26 RX ADMIN — MIDAZOLAM HYDROCHLORIDE 3 MG/HR: 5 INJECTION, SOLUTION INTRAMUSCULAR; INTRAVENOUS at 14:23

## 2024-06-26 RX ADMIN — AMLODIPINE BESYLATE 5 MG: 5 TABLET ORAL at 07:46

## 2024-06-26 RX ADMIN — FENTANYL CITRATE 50 MCG: 0.05 INJECTION, SOLUTION INTRAMUSCULAR; INTRAVENOUS at 07:55

## 2024-06-26 RX ADMIN — LINEZOLID 600 MG: 600 INJECTION, SOLUTION INTRAVENOUS at 10:30

## 2024-06-26 ASSESSMENT — PULMONARY FUNCTION TESTS
PIF_VALUE: 31
PIF_VALUE: 28
PIF_VALUE: 35
PIF_VALUE: 32
PIF_VALUE: 27
PIF_VALUE: 39
PIF_VALUE: 32
PIF_VALUE: 27
PIF_VALUE: 38
PIF_VALUE: 37
PIF_VALUE: 39
PIF_VALUE: 35
PIF_VALUE: 28
PIF_VALUE: 28
PIF_VALUE: 37
PIF_VALUE: 28
PIF_VALUE: 35
PIF_VALUE: 28
PIF_VALUE: 37
PIF_VALUE: 39
PIF_VALUE: 35
PIF_VALUE: 31
PIF_VALUE: 34
PIF_VALUE: 28
PIF_VALUE: 35
PIF_VALUE: 30
PIF_VALUE: 27
PIF_VALUE: 37
PIF_VALUE: 37
PIF_VALUE: 34
PIF_VALUE: 33
PIF_VALUE: 32
PIF_VALUE: 30
PIF_VALUE: 30
PIF_VALUE: 38
PIF_VALUE: 34
PIF_VALUE: 49
PIF_VALUE: 40
PIF_VALUE: 27
PIF_VALUE: 32
PIF_VALUE: 34

## 2024-06-26 NOTE — CARE COORDINATION
ONGOING DISCHARGE PLAN:    Unable to speak to Pt. At this time.     Remains on the VENT, 30% FIO2. Per notes, not following commands,Pulmonary on board.     No family at the bedside.     Writer reviewed chart notes.     Pt. Is POD #4, Left Knee, JAY Drain, I & D w/ Ortho.      Remains on IV Zosyn, ID on board.     Podiatry on board, per notes, no plan for TMA.     No Vascular interventions at this time.     Per Nephro- continue w/ Dialysis, Tunneled Dialysis cath to be placed on Friday in IR.     On TF.     PT/OT on board.     Meets LTACH Criteria.     Will continue to follow for additional discharge needs.    If patient is discharged prior to next notation, then this note serves as note for discharge by case management.    Electronically signed by Adrianne Sarmiento RN on 6/26/2024 at 3:09 PM

## 2024-06-27 ENCOUNTER — APPOINTMENT (OUTPATIENT)
Dept: INTERVENTIONAL RADIOLOGY/VASCULAR | Age: 64
DRG: 870 | End: 2024-06-27
Attending: INTERNAL MEDICINE
Payer: MEDICARE

## 2024-06-27 ENCOUNTER — APPOINTMENT (OUTPATIENT)
Dept: GENERAL RADIOLOGY | Age: 64
DRG: 870 | End: 2024-06-27
Payer: MEDICARE

## 2024-06-27 LAB
ANION GAP SERPL CALCULATED.3IONS-SCNC: 13 MMOL/L (ref 9–17)
ARTERIAL PATENCY WRIST A: ABNORMAL
BACTERIA URNS QL MICRO: ABNORMAL
BASOPHILS # BLD: 0 K/UL (ref 0–0.2)
BASOPHILS NFR BLD: 0 % (ref 0–2)
BDY SITE: ABNORMAL
BILIRUB UR QL STRIP: NEGATIVE
BODY TEMPERATURE: 37
BUN SERPL-MCNC: 71 MG/DL (ref 8–23)
CALCIUM SERPL-MCNC: 7.7 MG/DL (ref 8.6–10.4)
CASTS #/AREA URNS LPF: ABNORMAL /LPF
CHLORIDE SERPL-SCNC: 102 MMOL/L (ref 98–107)
CLARITY UR: ABNORMAL
CO2 SERPL-SCNC: 22 MMOL/L (ref 20–31)
COHGB MFR BLD: 1.3 % (ref 0–5)
COLOR UR: ABNORMAL
CREAT SERPL-MCNC: 3.3 MG/DL (ref 0.7–1.2)
CRP SERPL HS-MCNC: 99 MG/L (ref 0–5)
EOSINOPHIL # BLD: 0.17 K/UL (ref 0–0.4)
EOSINOPHILS RELATIVE PERCENT: 1 % (ref 0–4)
EPI CELLS #/AREA URNS HPF: ABNORMAL /HPF
ERYTHROCYTE [DISTWIDTH] IN BLOOD BY AUTOMATED COUNT: 17.8 % (ref 11.5–14.9)
ERYTHROCYTE [SEDIMENTATION RATE] IN BLOOD BY PHOTOMETRIC METHOD: 73 MM/HR (ref 0–20)
FIO2 ON VENT: 30 %
GAS FLOW.O2 O2 DELIVERY SYS: ABNORMAL L/MIN
GAS FLOW.O2 SETTING OXYMISER: 28 L/MIN
GFR, ESTIMATED: 20 ML/MIN/1.73M2
GLUCOSE BLD-MCNC: 251 MG/DL (ref 75–110)
GLUCOSE BLD-MCNC: 254 MG/DL (ref 75–110)
GLUCOSE BLD-MCNC: 288 MG/DL (ref 75–110)
GLUCOSE BLD-MCNC: 291 MG/DL (ref 75–110)
GLUCOSE BLD-MCNC: 333 MG/DL (ref 75–110)
GLUCOSE BLD-MCNC: 349 MG/DL (ref 75–110)
GLUCOSE SERPL-MCNC: 400 MG/DL (ref 70–99)
GLUCOSE UR STRIP-MCNC: ABNORMAL MG/DL
HCO3 ARTERIAL: 25.6 MMOL/L (ref 22–26)
HCT VFR BLD AUTO: 35.9 % (ref 41–53)
HGB BLD-MCNC: 11.3 G/DL (ref 13.5–17.5)
HGB UR QL STRIP.AUTO: ABNORMAL
KETONES UR STRIP-MCNC: NEGATIVE MG/DL
LEUKOCYTE ESTERASE UR QL STRIP: ABNORMAL
LYMPHOCYTES NFR BLD: 2.03 K/UL (ref 1–4.8)
LYMPHOCYTES RELATIVE PERCENT: 12 % (ref 24–44)
MCH RBC QN AUTO: 27.1 PG (ref 26–34)
MCHC RBC AUTO-ENTMCNC: 31.5 G/DL (ref 31–37)
MCV RBC AUTO: 86 FL (ref 80–100)
METHEMOGLOBIN: 1.6 % (ref 0–1.9)
MICROORGANISM SPEC CULT: ABNORMAL
MICROORGANISM SPEC CULT: ABNORMAL
MICROORGANISM SPEC CULT: NORMAL
MICROORGANISM SPEC CULT: NORMAL
MICROORGANISM/AGENT SPEC: ABNORMAL
MICROORGANISM/AGENT SPEC: NORMAL
MONOCYTES NFR BLD: 1.01 K/UL (ref 0.1–1.3)
MONOCYTES NFR BLD: 6 % (ref 1–7)
MORPHOLOGY: ABNORMAL
NEUTROPHILS NFR BLD: 81 % (ref 36–66)
NEUTS SEG NFR BLD: 13.69 K/UL (ref 1.3–9.1)
NITRITE UR QL STRIP: NEGATIVE
O2 SAT, ARTERIAL: 96.5 % (ref 95–98)
PCO2 ARTERIAL: 42.1 MMHG (ref 35–45)
PEEP RESPIRATORY: 8 CM[H2O]
PH ARTERIAL: 7.39 (ref 7.35–7.45)
PH UR STRIP: 6 [PH] (ref 5–8)
PLATELET # BLD AUTO: 133 K/UL (ref 150–450)
PMV BLD AUTO: 9.1 FL (ref 6–12)
PO2 ARTERIAL: 141 MMHG (ref 80–100)
POSITIVE BASE EXCESS, ART: 0.7 MMOL/L (ref 0–2)
POTASSIUM SERPL-SCNC: 4.2 MMOL/L (ref 3.7–5.3)
PROT UR STRIP-MCNC: ABNORMAL MG/DL
PT. POSITION: ABNORMAL
RBC # BLD AUTO: 4.18 M/UL (ref 4.5–5.9)
RBC #/AREA URNS HPF: ABNORMAL /HPF
RESPIRATORY RATE: 28
SODIUM SERPL-SCNC: 137 MMOL/L (ref 135–144)
SP GR UR STRIP: 1.01 (ref 1–1.03)
SPECIMEN DESCRIPTION: ABNORMAL
SPECIMEN DESCRIPTION: NORMAL
TEXT FOR RESPIRATORY: ABNORMAL
TOTAL RATE: 32
TRIGL SERPL-MCNC: 617 MG/DL
UROBILINOGEN UR STRIP-ACNC: NORMAL EU/DL (ref 0–1)
VENTILATION MODE VENT: ABNORMAL
VT: 500
WBC #/AREA URNS HPF: ABNORMAL /HPF
WBC OTHER # BLD: 16.9 K/UL (ref 3.5–11)

## 2024-06-27 PROCEDURE — 99233 SBSQ HOSP IP/OBS HIGH 50: CPT | Performed by: INTERNAL MEDICINE

## 2024-06-27 PROCEDURE — 99232 SBSQ HOSP IP/OBS MODERATE 35: CPT | Performed by: INTERNAL MEDICINE

## 2024-06-27 PROCEDURE — 71045 X-RAY EXAM CHEST 1 VIEW: CPT

## 2024-06-27 PROCEDURE — 2000000000 HC ICU R&B

## 2024-06-27 PROCEDURE — 6370000000 HC RX 637 (ALT 250 FOR IP)

## 2024-06-27 PROCEDURE — 02HV33Z INSERTION OF INFUSION DEVICE INTO SUPERIOR VENA CAVA, PERCUTANEOUS APPROACH: ICD-10-PCS | Performed by: INTERNAL MEDICINE

## 2024-06-27 PROCEDURE — 2500000003 HC RX 250 WO HCPCS

## 2024-06-27 PROCEDURE — 6360000002 HC RX W HCPCS: Performed by: INTERNAL MEDICINE

## 2024-06-27 PROCEDURE — 84478 ASSAY OF TRIGLYCERIDES: CPT

## 2024-06-27 PROCEDURE — 51798 US URINE CAPACITY MEASURE: CPT

## 2024-06-27 PROCEDURE — 80048 BASIC METABOLIC PNL TOTAL CA: CPT

## 2024-06-27 PROCEDURE — 86140 C-REACTIVE PROTEIN: CPT

## 2024-06-27 PROCEDURE — 6370000000 HC RX 637 (ALT 250 FOR IP): Performed by: INTERNAL MEDICINE

## 2024-06-27 PROCEDURE — 6360000002 HC RX W HCPCS: Performed by: RADIOLOGY

## 2024-06-27 PROCEDURE — 77001 FLUOROGUIDE FOR VEIN DEVICE: CPT

## 2024-06-27 PROCEDURE — 99211 OFF/OP EST MAY X REQ PHY/QHP: CPT

## 2024-06-27 PROCEDURE — 2580000003 HC RX 258

## 2024-06-27 PROCEDURE — 2580000003 HC RX 258: Performed by: INTERNAL MEDICINE

## 2024-06-27 PROCEDURE — 82947 ASSAY GLUCOSE BLOOD QUANT: CPT

## 2024-06-27 PROCEDURE — 94003 VENT MGMT INPAT SUBQ DAY: CPT

## 2024-06-27 PROCEDURE — 87040 BLOOD CULTURE FOR BACTERIA: CPT

## 2024-06-27 PROCEDURE — 36600 WITHDRAWAL OF ARTERIAL BLOOD: CPT

## 2024-06-27 PROCEDURE — 85025 COMPLETE CBC W/AUTO DIFF WBC: CPT

## 2024-06-27 PROCEDURE — 82805 BLOOD GASES W/O2 SATURATION: CPT

## 2024-06-27 PROCEDURE — 85652 RBC SED RATE AUTOMATED: CPT

## 2024-06-27 PROCEDURE — C9113 INJ PANTOPRAZOLE SODIUM, VIA: HCPCS | Performed by: INTERNAL MEDICINE

## 2024-06-27 PROCEDURE — 2580000003 HC RX 258: Performed by: ORTHOPAEDIC SURGERY

## 2024-06-27 PROCEDURE — 36556 INSERT NON-TUNNEL CV CATH: CPT

## 2024-06-27 PROCEDURE — 6370000000 HC RX 637 (ALT 250 FOR IP): Performed by: NURSE PRACTITIONER

## 2024-06-27 PROCEDURE — 6360000002 HC RX W HCPCS: Performed by: ORTHOPAEDIC SURGERY

## 2024-06-27 PROCEDURE — 81001 URINALYSIS AUTO W/SCOPE: CPT

## 2024-06-27 PROCEDURE — 76937 US GUIDE VASCULAR ACCESS: CPT

## 2024-06-27 PROCEDURE — 2709999900 IR NONTUNNELED VASCULAR CATHETER > 5 YEARS

## 2024-06-27 PROCEDURE — 87086 URINE CULTURE/COLONY COUNT: CPT

## 2024-06-27 PROCEDURE — 36415 COLL VENOUS BLD VENIPUNCTURE: CPT

## 2024-06-27 RX ORDER — INSULIN LISPRO 100 [IU]/ML
0-16 INJECTION, SOLUTION INTRAVENOUS; SUBCUTANEOUS EVERY 4 HOURS
Status: DISCONTINUED | OUTPATIENT
Start: 2024-06-27 | End: 2024-07-01

## 2024-06-27 RX ORDER — INSULIN LISPRO 100 [IU]/ML
5 INJECTION, SOLUTION INTRAVENOUS; SUBCUTANEOUS ONCE
Status: COMPLETED | OUTPATIENT
Start: 2024-06-27 | End: 2024-06-27

## 2024-06-27 RX ORDER — INSULIN GLARGINE 100 [IU]/ML
15 INJECTION, SOLUTION SUBCUTANEOUS NIGHTLY
Status: DISCONTINUED | OUTPATIENT
Start: 2024-06-27 | End: 2024-06-28

## 2024-06-27 RX ORDER — AMLODIPINE BESYLATE 5 MG/1
5 TABLET ORAL ONCE
Status: COMPLETED | OUTPATIENT
Start: 2024-06-27 | End: 2024-06-27

## 2024-06-27 RX ORDER — CLONIDINE HYDROCHLORIDE 0.1 MG/1
0.1 TABLET ORAL 3 TIMES DAILY
Status: DISCONTINUED | OUTPATIENT
Start: 2024-06-27 | End: 2024-06-27

## 2024-06-27 RX ORDER — INSULIN LISPRO 100 [IU]/ML
0-16 INJECTION, SOLUTION INTRAVENOUS; SUBCUTANEOUS
Status: DISCONTINUED | OUTPATIENT
Start: 2024-06-27 | End: 2024-06-27

## 2024-06-27 RX ORDER — HEPARIN SODIUM 1000 [USP'U]/ML
INJECTION, SOLUTION INTRAVENOUS; SUBCUTANEOUS PRN
Status: COMPLETED | OUTPATIENT
Start: 2024-06-27 | End: 2024-06-27

## 2024-06-27 RX ORDER — INSULIN LISPRO 100 [IU]/ML
0-4 INJECTION, SOLUTION INTRAVENOUS; SUBCUTANEOUS NIGHTLY
Status: DISCONTINUED | OUTPATIENT
Start: 2024-06-27 | End: 2024-07-01

## 2024-06-27 RX ORDER — AMLODIPINE BESYLATE 10 MG/1
10 TABLET ORAL DAILY
Status: DISCONTINUED | OUTPATIENT
Start: 2024-06-28 | End: 2024-07-01

## 2024-06-27 RX ORDER — CLONIDINE HYDROCHLORIDE 0.1 MG/1
0.1 TABLET ORAL 3 TIMES DAILY
Status: DISCONTINUED | OUTPATIENT
Start: 2024-06-27 | End: 2024-06-30

## 2024-06-27 RX ORDER — METOPROLOL TARTRATE 1 MG/ML
5 INJECTION, SOLUTION INTRAVENOUS ONCE
Status: COMPLETED | OUTPATIENT
Start: 2024-06-27 | End: 2024-06-27

## 2024-06-27 RX ADMIN — PIPERACILLIN AND TAZOBACTAM 3375 MG: 3; .375 INJECTION, POWDER, LYOPHILIZED, FOR SOLUTION INTRAVENOUS at 04:28

## 2024-06-27 RX ADMIN — INSULIN LISPRO 8 UNITS: 100 INJECTION, SOLUTION INTRAVENOUS; SUBCUTANEOUS at 17:11

## 2024-06-27 RX ADMIN — INSULIN LISPRO 8 UNITS: 100 INJECTION, SOLUTION INTRAVENOUS; SUBCUTANEOUS at 19:44

## 2024-06-27 RX ADMIN — HYDRALAZINE HYDROCHLORIDE 100 MG: 50 TABLET ORAL at 13:28

## 2024-06-27 RX ADMIN — CLONIDINE HYDROCHLORIDE 0.1 MG: 0.1 TABLET ORAL at 19:43

## 2024-06-27 RX ADMIN — INSULIN LISPRO 5 UNITS: 100 INJECTION, SOLUTION INTRAVENOUS; SUBCUTANEOUS at 09:46

## 2024-06-27 RX ADMIN — METOPROLOL TARTRATE 5 MG: 1 INJECTION, SOLUTION INTRAVENOUS at 02:22

## 2024-06-27 RX ADMIN — METOPROLOL TARTRATE 5 MG: 1 INJECTION, SOLUTION INTRAVENOUS at 04:15

## 2024-06-27 RX ADMIN — METOPROLOL TARTRATE 5 MG: 1 INJECTION, SOLUTION INTRAVENOUS at 09:47

## 2024-06-27 RX ADMIN — VANCOMYCIN HYDROCHLORIDE 1750 MG: 1 INJECTION, POWDER, LYOPHILIZED, FOR SOLUTION INTRAVENOUS at 17:17

## 2024-06-27 RX ADMIN — INSULIN LISPRO 8 UNITS: 100 INJECTION, SOLUTION INTRAVENOUS; SUBCUTANEOUS at 12:35

## 2024-06-27 RX ADMIN — INSULIN LISPRO 5 UNITS: 100 INJECTION, SOLUTION INTRAVENOUS; SUBCUTANEOUS at 05:57

## 2024-06-27 RX ADMIN — METOPROLOL TARTRATE 12.5 MG: 25 TABLET, FILM COATED ORAL at 19:43

## 2024-06-27 RX ADMIN — INSULIN LISPRO 8 UNITS: 100 INJECTION, SOLUTION INTRAVENOUS; SUBCUTANEOUS at 23:55

## 2024-06-27 RX ADMIN — SODIUM CHLORIDE, PRESERVATIVE FREE 40 MG: 5 INJECTION INTRAVENOUS at 07:36

## 2024-06-27 RX ADMIN — HEPARIN SODIUM 1000 UNITS: 1000 INJECTION INTRAVENOUS; SUBCUTANEOUS at 15:58

## 2024-06-27 RX ADMIN — HEPARIN SODIUM 5000 UNITS: 5000 INJECTION INTRAVENOUS; SUBCUTANEOUS at 04:15

## 2024-06-27 RX ADMIN — HEPARIN SODIUM 5000 UNITS: 5000 INJECTION INTRAVENOUS; SUBCUTANEOUS at 21:14

## 2024-06-27 RX ADMIN — INSULIN LISPRO 3 UNITS: 100 INJECTION, SOLUTION INTRAVENOUS; SUBCUTANEOUS at 07:36

## 2024-06-27 RX ADMIN — SODIUM CHLORIDE, PRESERVATIVE FREE 10 ML: 5 INJECTION INTRAVENOUS at 08:16

## 2024-06-27 RX ADMIN — AMLODIPINE BESYLATE 5 MG: 5 TABLET ORAL at 07:36

## 2024-06-27 RX ADMIN — METOPROLOL TARTRATE 5 MG: 1 INJECTION, SOLUTION INTRAVENOUS at 14:52

## 2024-06-27 RX ADMIN — HYDRALAZINE HYDROCHLORIDE 100 MG: 50 TABLET ORAL at 06:19

## 2024-06-27 RX ADMIN — MIDAZOLAM HYDROCHLORIDE 1 MG/HR: 5 INJECTION, SOLUTION INTRAMUSCULAR; INTRAVENOUS at 04:08

## 2024-06-27 RX ADMIN — HEPARIN SODIUM 1200 UNITS: 1000 INJECTION INTRAVENOUS; SUBCUTANEOUS at 15:58

## 2024-06-27 RX ADMIN — FENTANYL CITRATE 50 MCG: 0.05 INJECTION, SOLUTION INTRAMUSCULAR; INTRAVENOUS at 14:11

## 2024-06-27 RX ADMIN — AMLODIPINE BESYLATE 5 MG: 5 TABLET ORAL at 12:37

## 2024-06-27 RX ADMIN — INSULIN GLARGINE 15 UNITS: 100 INJECTION, SOLUTION SUBCUTANEOUS at 21:13

## 2024-06-27 RX ADMIN — SODIUM CHLORIDE: 9 INJECTION, SOLUTION INTRAVENOUS at 02:16

## 2024-06-27 RX ADMIN — HYDRALAZINE HYDROCHLORIDE 100 MG: 50 TABLET ORAL at 19:43

## 2024-06-27 RX ADMIN — CLONIDINE HYDROCHLORIDE 0.1 MG: 0.1 TABLET ORAL at 17:21

## 2024-06-27 RX ADMIN — PIPERACILLIN AND TAZOBACTAM 3375 MG: 3; .375 INJECTION, POWDER, LYOPHILIZED, FOR SOLUTION INTRAVENOUS at 17:15

## 2024-06-27 RX ADMIN — HEPARIN SODIUM 5000 UNITS: 5000 INJECTION INTRAVENOUS; SUBCUTANEOUS at 14:14

## 2024-06-27 ASSESSMENT — PULMONARY FUNCTION TESTS
PIF_VALUE: 28
PIF_VALUE: 30
PIF_VALUE: 32
PIF_VALUE: 30
PIF_VALUE: 31
PIF_VALUE: 32
PIF_VALUE: 31
PIF_VALUE: 30
PIF_VALUE: 31
PIF_VALUE: 30
PIF_VALUE: 32
PIF_VALUE: 29
PIF_VALUE: 32
PIF_VALUE: 30
PIF_VALUE: 31
PIF_VALUE: 34
PIF_VALUE: 32
PIF_VALUE: 31
PIF_VALUE: 29
PIF_VALUE: 31
PIF_VALUE: 31
PIF_VALUE: 32
PIF_VALUE: 35
PIF_VALUE: 34
PIF_VALUE: 32
PIF_VALUE: 33
PIF_VALUE: 47
PIF_VALUE: 34
PIF_VALUE: 32
PIF_VALUE: 34
PIF_VALUE: 47
PIF_VALUE: 30
PIF_VALUE: 35
PIF_VALUE: 35
PIF_VALUE: 29
PIF_VALUE: 30
PIF_VALUE: 31
PIF_VALUE: 34
PIF_VALUE: 32

## 2024-06-27 NOTE — CARE COORDINATION
ONGOING DISCHARGE PLAN:    Patient remains on the Vent, 30% FIO2. Unable to wean today.    Spoke with patient's Son, Barrie, regarding discharge plan and he confirms that plan is undecided at this time.     Writer informed him that pt. Meets LTACH Criteria.     Barrie/Family is unsure of DC plans/needs at this time.                        Post Acute Facility/Agency List     Provided child with the following list, the list includes the overall star ratings obtained from CMS per the Medicare Web site (www.Medicare.gov):     [x] Long Term Acute Care Facilities  [] Acute Inpatient Rehabilitation Facilities  [x] Skilled Nursing Facilities  [] Home Care    Provided verbal instructions on how to utilize the QR Code to obtain additional detailed star ratings from www.Medicare.gov     offered to print and provide the detailed list:    [x]Accepted   []Declined    Following for choices.     Pt. Is POD #4, Left Knee, JAY Drain, I & D w/ Ortho.       Remains on IV Zosyn, ID on board. +BC.     Podiatry on board, per notes, no plan for TMA.     Per Nephro Notes, Temp Dialysis Cath Exchange. CR. 3.3.     Per Pulm notes, Family is unsure, but leaning against Trach/Peg.     Will follow.     Will continue to follow for additional discharge needs.    If patient is discharged prior to next notation, then this note serves as note for discharge by case management.    Electronically signed by Adrianne Sarmiento RN on 6/27/2024 at 2:02 PM

## 2024-06-28 ENCOUNTER — HOSPITAL ENCOUNTER (INPATIENT)
Dept: VASCULAR LAB | Age: 64
Discharge: HOME | DRG: 870 | End: 2024-06-30
Payer: MEDICARE

## 2024-06-28 ENCOUNTER — APPOINTMENT (OUTPATIENT)
Dept: GENERAL RADIOLOGY | Age: 64
DRG: 870 | End: 2024-06-28
Payer: MEDICARE

## 2024-06-28 LAB
ABSOLUTE BANDS: 1.04 K/UL (ref 0–1)
ALBUMIN PERCENT: 32 % (ref 45–65)
ALBUMIN SERPL-MCNC: 2 G/DL (ref 3.2–5.2)
ALPHA 2 PERCENT: 17 % (ref 6–13)
ALPHA1 GLOB SERPL ELPH-MCNC: 0.6 G/DL (ref 0.1–0.4)
ALPHA1 GLOB SERPL ELPH-MCNC: 10 % (ref 3–6)
ALPHA2 GLOB SERPL ELPH-MCNC: 1.1 G/DL (ref 0.5–0.9)
ANION GAP SERPL CALCULATED.3IONS-SCNC: 12 MMOL/L (ref 9–17)
ARTERIAL PATENCY WRIST A: ABNORMAL
B-GLOBULIN SERPL ELPH-MCNC: 0.7 G/DL (ref 0.5–1.1)
B-GLOBULIN SERPL ELPH-MCNC: 12 % (ref 11–19)
BANDS: 7 % (ref 0–10)
BASOPHILS # BLD: 0 K/UL (ref 0–0.2)
BASOPHILS NFR BLD: 0 % (ref 0–2)
BDY SITE: ABNORMAL
BODY TEMPERATURE: 37
BUN SERPL-MCNC: 81 MG/DL (ref 8–23)
CALCIUM SERPL-MCNC: 7.7 MG/DL (ref 8.6–10.4)
CHLORIDE SERPL-SCNC: 105 MMOL/L (ref 98–107)
CO2 SERPL-SCNC: 20 MMOL/L (ref 20–31)
COHGB MFR BLD: 1.3 % (ref 0–5)
CREAT SERPL-MCNC: 3.6 MG/DL (ref 0.7–1.2)
CRP SERPL HS-MCNC: 77.4 MG/L (ref 0–5)
DATE LAST DOSE: NORMAL
EOSINOPHIL # BLD: 0.3 K/UL (ref 0–0.4)
EOSINOPHILS RELATIVE PERCENT: 2 % (ref 0–4)
ERYTHROCYTE [DISTWIDTH] IN BLOOD BY AUTOMATED COUNT: 17.9 % (ref 11.5–14.9)
ERYTHROCYTE [SEDIMENTATION RATE] IN BLOOD BY PHOTOMETRIC METHOD: 80 MM/HR (ref 0–20)
FIO2 ON VENT: 30 %
GAMMA GLOB SERPL ELPH-MCNC: 1.9 G/DL (ref 0.5–1.5)
GAMMA GLOBULIN %: 30 % (ref 9–20)
GAS FLOW.O2 O2 DELIVERY SYS: ABNORMAL L/MIN
GAS FLOW.O2 SETTING OXYMISER: 24 L/MIN
GFR, ESTIMATED: 18 ML/MIN/1.73M2
GLUCOSE BLD-MCNC: 159 MG/DL (ref 75–110)
GLUCOSE BLD-MCNC: 237 MG/DL (ref 75–110)
GLUCOSE BLD-MCNC: 267 MG/DL (ref 75–110)
GLUCOSE BLD-MCNC: 286 MG/DL (ref 75–110)
GLUCOSE BLD-MCNC: 287 MG/DL (ref 75–110)
GLUCOSE SERPL-MCNC: 289 MG/DL (ref 70–99)
HCO3 ARTERIAL: 24.8 MMOL/L (ref 22–26)
HCT VFR BLD AUTO: 31.9 % (ref 41–53)
HGB BLD-MCNC: 10.3 G/DL (ref 13.5–17.5)
ITYP INTERPRETATION: NORMAL
LYMPHOCYTES NFR BLD: 1.64 K/UL (ref 1–4.8)
LYMPHOCYTES RELATIVE PERCENT: 11 % (ref 24–44)
MCH RBC QN AUTO: 27.2 PG (ref 26–34)
MCHC RBC AUTO-ENTMCNC: 32.3 G/DL (ref 31–37)
MCV RBC AUTO: 84.2 FL (ref 80–100)
METAMYELOCYTES ABSOLUTE COUNT: 0.3 K/UL
METAMYELOCYTES: 2 %
METHEMOGLOBIN: 1.5 % (ref 0–1.9)
MICROORGANISM SPEC CULT: NO GROWTH
MONOCYTES NFR BLD: 0.3 K/UL (ref 0.1–1.3)
MONOCYTES NFR BLD: 2 % (ref 1–7)
MORPHOLOGY: ABNORMAL
NEGATIVE BASE EXCESS, ART: 0.1 MMOL/L (ref 0–2)
NEUTROPHILS NFR BLD: 76 % (ref 36–66)
NEUTS SEG NFR BLD: 11.32 K/UL (ref 1.3–9.1)
O2 SAT, ARTERIAL: 96 % (ref 95–98)
PATH REV: NORMAL
PATHOLOGIST: ABNORMAL
PCO2 ARTERIAL: 40.5 MMHG (ref 35–45)
PEEP RESPIRATORY: 8 CM[H2O]
PH ARTERIAL: 7.39 (ref 7.35–7.45)
PLATELET # BLD AUTO: 142 K/UL (ref 150–450)
PMV BLD AUTO: 9.3 FL (ref 6–12)
PO2 ARTERIAL: 112 MMHG (ref 80–100)
POTASSIUM SERPL-SCNC: 3.9 MMOL/L (ref 3.7–5.3)
PROT PATTERN SERPL ELPH-IMP: ABNORMAL
PROT SERPL-MCNC: 6.3 G/DL (ref 6.6–8.7)
PT. POSITION: ABNORMAL
RBC # BLD AUTO: 3.78 M/UL (ref 4.5–5.9)
RESPIRATORY RATE: 24
SERVICE CMNT-IMP: NORMAL
SODIUM SERPL-SCNC: 137 MMOL/L (ref 135–144)
SPECIMEN DESCRIPTION: NORMAL
TEXT FOR RESPIRATORY: ABNORMAL
TME LAST DOSE: 1717 H
TOTAL PROT. SUM,%: 101 % (ref 98–102)
TOTAL PROT. SUM: 6.3 G/DL (ref 6.3–8.2)
TOTAL RATE: 28
VANCOMYCIN DOSE: NORMAL MG
VANCOMYCIN SERPL-MCNC: 20.9 UG/ML
VENTILATION MODE VENT: ABNORMAL
VT: 500
WBC OTHER # BLD: 14.9 K/UL (ref 3.5–11)

## 2024-06-28 PROCEDURE — 2580000003 HC RX 258: Performed by: INTERNAL MEDICINE

## 2024-06-28 PROCEDURE — 2000000000 HC ICU R&B

## 2024-06-28 PROCEDURE — 85025 COMPLETE CBC W/AUTO DIFF WBC: CPT

## 2024-06-28 PROCEDURE — 94003 VENT MGMT INPAT SUBQ DAY: CPT

## 2024-06-28 PROCEDURE — 99291 CRITICAL CARE FIRST HOUR: CPT | Performed by: INTERNAL MEDICINE

## 2024-06-28 PROCEDURE — 6360000002 HC RX W HCPCS: Performed by: INTERNAL MEDICINE

## 2024-06-28 PROCEDURE — 6370000000 HC RX 637 (ALT 250 FOR IP)

## 2024-06-28 PROCEDURE — 80202 ASSAY OF VANCOMYCIN: CPT

## 2024-06-28 PROCEDURE — 36415 COLL VENOUS BLD VENIPUNCTURE: CPT

## 2024-06-28 PROCEDURE — 2580000003 HC RX 258: Performed by: ORTHOPAEDIC SURGERY

## 2024-06-28 PROCEDURE — 80048 BASIC METABOLIC PNL TOTAL CA: CPT

## 2024-06-28 PROCEDURE — 99232 SBSQ HOSP IP/OBS MODERATE 35: CPT | Performed by: INTERNAL MEDICINE

## 2024-06-28 PROCEDURE — 6360000002 HC RX W HCPCS: Performed by: ORTHOPAEDIC SURGERY

## 2024-06-28 PROCEDURE — 36600 WITHDRAWAL OF ARTERIAL BLOOD: CPT

## 2024-06-28 PROCEDURE — 82805 BLOOD GASES W/O2 SATURATION: CPT

## 2024-06-28 PROCEDURE — 99233 SBSQ HOSP IP/OBS HIGH 50: CPT | Performed by: INTERNAL MEDICINE

## 2024-06-28 PROCEDURE — 6370000000 HC RX 637 (ALT 250 FOR IP): Performed by: INTERNAL MEDICINE

## 2024-06-28 PROCEDURE — 85652 RBC SED RATE AUTOMATED: CPT

## 2024-06-28 PROCEDURE — 94761 N-INVAS EAR/PLS OXIMETRY MLT: CPT

## 2024-06-28 PROCEDURE — 99222 1ST HOSP IP/OBS MODERATE 55: CPT

## 2024-06-28 PROCEDURE — 82947 ASSAY GLUCOSE BLOOD QUANT: CPT

## 2024-06-28 PROCEDURE — 93971 EXTREMITY STUDY: CPT

## 2024-06-28 PROCEDURE — 71045 X-RAY EXAM CHEST 1 VIEW: CPT

## 2024-06-28 PROCEDURE — 86140 C-REACTIVE PROTEIN: CPT

## 2024-06-28 PROCEDURE — 90935 HEMODIALYSIS ONE EVALUATION: CPT

## 2024-06-28 PROCEDURE — 2700000000 HC OXYGEN THERAPY PER DAY

## 2024-06-28 PROCEDURE — C9113 INJ PANTOPRAZOLE SODIUM, VIA: HCPCS | Performed by: INTERNAL MEDICINE

## 2024-06-28 PROCEDURE — 51798 US URINE CAPACITY MEASURE: CPT

## 2024-06-28 RX ORDER — HEPARIN SODIUM 5000 [USP'U]/ML
5000 INJECTION, SOLUTION INTRAVENOUS; SUBCUTANEOUS EVERY 8 HOURS
Status: DISCONTINUED | OUTPATIENT
Start: 2024-06-29 | End: 2024-07-01

## 2024-06-28 RX ORDER — INSULIN GLARGINE 100 [IU]/ML
25 INJECTION, SOLUTION SUBCUTANEOUS NIGHTLY
Status: DISCONTINUED | OUTPATIENT
Start: 2024-06-28 | End: 2024-06-30

## 2024-06-28 RX ADMIN — INSULIN LISPRO 8 UNITS: 100 INJECTION, SOLUTION INTRAVENOUS; SUBCUTANEOUS at 11:51

## 2024-06-28 RX ADMIN — INSULIN GLARGINE 25 UNITS: 100 INJECTION, SOLUTION SUBCUTANEOUS at 21:53

## 2024-06-28 RX ADMIN — CLONIDINE HYDROCHLORIDE 0.1 MG: 0.1 TABLET ORAL at 15:50

## 2024-06-28 RX ADMIN — VANCOMYCIN HYDROCHLORIDE 1750 MG: 1 INJECTION, POWDER, LYOPHILIZED, FOR SOLUTION INTRAVENOUS at 19:33

## 2024-06-28 RX ADMIN — HEPARIN SODIUM 5000 UNITS: 5000 INJECTION INTRAVENOUS; SUBCUTANEOUS at 05:42

## 2024-06-28 RX ADMIN — METOPROLOL TARTRATE 12.5 MG: 25 TABLET, FILM COATED ORAL at 21:54

## 2024-06-28 RX ADMIN — AMLODIPINE BESYLATE 10 MG: 10 TABLET ORAL at 07:55

## 2024-06-28 RX ADMIN — HEPARIN SODIUM 5000 UNITS: 5000 INJECTION INTRAVENOUS; SUBCUTANEOUS at 15:50

## 2024-06-28 RX ADMIN — INSULIN LISPRO 8 UNITS: 100 INJECTION, SOLUTION INTRAVENOUS; SUBCUTANEOUS at 16:07

## 2024-06-28 RX ADMIN — HYDRALAZINE HYDROCHLORIDE 100 MG: 50 TABLET ORAL at 21:54

## 2024-06-28 RX ADMIN — INSULIN LISPRO 8 UNITS: 100 INJECTION, SOLUTION INTRAVENOUS; SUBCUTANEOUS at 08:00

## 2024-06-28 RX ADMIN — CLONIDINE HYDROCHLORIDE 0.1 MG: 0.1 TABLET ORAL at 21:54

## 2024-06-28 RX ADMIN — SODIUM CHLORIDE, PRESERVATIVE FREE 40 MG: 5 INJECTION INTRAVENOUS at 07:55

## 2024-06-28 RX ADMIN — HYDRALAZINE HYDROCHLORIDE 100 MG: 50 TABLET ORAL at 15:50

## 2024-06-28 RX ADMIN — CLONIDINE HYDROCHLORIDE 0.1 MG: 0.1 TABLET ORAL at 07:55

## 2024-06-28 RX ADMIN — INSULIN LISPRO 4 UNITS: 100 INJECTION, SOLUTION INTRAVENOUS; SUBCUTANEOUS at 03:42

## 2024-06-28 RX ADMIN — MIDAZOLAM HYDROCHLORIDE 5 MG/HR: 5 INJECTION, SOLUTION INTRAMUSCULAR; INTRAVENOUS at 05:41

## 2024-06-28 RX ADMIN — SODIUM CHLORIDE, PRESERVATIVE FREE 10 ML: 5 INJECTION INTRAVENOUS at 08:04

## 2024-06-28 RX ADMIN — HYDRALAZINE HYDROCHLORIDE 100 MG: 50 TABLET ORAL at 07:55

## 2024-06-28 RX ADMIN — PIPERACILLIN AND TAZOBACTAM 3375 MG: 3; .375 INJECTION, POWDER, LYOPHILIZED, FOR SOLUTION INTRAVENOUS at 03:53

## 2024-06-28 RX ADMIN — PIPERACILLIN AND TAZOBACTAM 3375 MG: 3; .375 INJECTION, POWDER, LYOPHILIZED, FOR SOLUTION INTRAVENOUS at 16:05

## 2024-06-28 ASSESSMENT — PULMONARY FUNCTION TESTS
PIF_VALUE: 27
PIF_VALUE: 26
PIF_VALUE: 28
PIF_VALUE: 30
PIF_VALUE: 26
PIF_VALUE: 26
PIF_VALUE: 27
PIF_VALUE: 26
PIF_VALUE: 30
PIF_VALUE: 27
PIF_VALUE: 31
PIF_VALUE: 27
PIF_VALUE: 28
PIF_VALUE: 28
PIF_VALUE: 27
PIF_VALUE: 27
PIF_VALUE: 28
PIF_VALUE: 30
PIF_VALUE: 27
PIF_VALUE: 28
PIF_VALUE: 27
PIF_VALUE: 30
PIF_VALUE: 33
PIF_VALUE: 32
PIF_VALUE: 30
PIF_VALUE: 33
PIF_VALUE: 27
PIF_VALUE: 28
PIF_VALUE: 27
PIF_VALUE: 34
PIF_VALUE: 30
PIF_VALUE: 29
PIF_VALUE: 27
PIF_VALUE: 28
PIF_VALUE: 27
PIF_VALUE: 30
PIF_VALUE: 27
PIF_VALUE: 32
PIF_VALUE: 29
PIF_VALUE: 27
PIF_VALUE: 26
PIF_VALUE: 29
PIF_VALUE: 33
PIF_VALUE: 29
PIF_VALUE: 27
PIF_VALUE: 31
PIF_VALUE: 26

## 2024-06-28 NOTE — CARE COORDINATION
ONGOING DISCHARGE PLAN:    Patient remains on the Vent, 30% FIO2. Unable to wean, Again, today.     Spoke with patient's Son, Barrie, regarding discharge plan and he confirms that plan is undecided at this time.      Barrie has no choices, from LTACH/SNF lists that writer gave him yesterday. He is unsure of what would be needed at this time.     Per Notes, Family will meet again next week if Pt. Can not come off Vent.     Pt. Is POD #5, Left Knee, JAY Drain, I & D w/ Ortho.       Remains on IV Zosyn,  WBC 14.9, ID on board. +BC.     Podiatry on board, per notes, no plan for TMA.      Per Nephro Notes, Temp Dialysis Cath Exchange. CR. 3.6.     Wound care on board.     PT/OT on board.      Will continue to follow for additional discharge needs.    If patient is discharged prior to next notation, then this note serves as note for discharge by case management.    Electronically signed by Adrianne Sarmiento RN on 6/28/2024 at 1:23 PM

## 2024-06-29 ENCOUNTER — APPOINTMENT (OUTPATIENT)
Dept: GENERAL RADIOLOGY | Age: 64
DRG: 870 | End: 2024-06-29
Payer: MEDICARE

## 2024-06-29 ENCOUNTER — APPOINTMENT (OUTPATIENT)
Dept: MRI IMAGING | Age: 64
DRG: 870 | End: 2024-06-29
Payer: MEDICARE

## 2024-06-29 LAB
ABSOLUTE BANDS: 0.26 K/UL (ref 0–1)
ALBUMIN SERPL-MCNC: 2 G/DL (ref 3.5–5.2)
ALP SERPL-CCNC: 324 U/L (ref 40–129)
ALT SERPL-CCNC: 43 U/L (ref 5–41)
ANION GAP SERPL CALCULATED.3IONS-SCNC: 8 MMOL/L (ref 9–17)
ARTERIAL PATENCY WRIST A: ABNORMAL
AST SERPL-CCNC: 45 U/L
BANDS: 2 % (ref 0–10)
BASOPHILS # BLD: 0 K/UL (ref 0–0.2)
BASOPHILS NFR BLD: 0 % (ref 0–2)
BDY SITE: ABNORMAL
BILIRUB DIRECT SERPL-MCNC: 1.1 MG/DL
BILIRUB INDIRECT SERPL-MCNC: 0.4 MG/DL (ref 0–1)
BILIRUB SERPL-MCNC: 1.5 MG/DL (ref 0.3–1.2)
BODY TEMPERATURE: 37
BUN SERPL-MCNC: 58 MG/DL (ref 8–23)
CALCIUM SERPL-MCNC: 7.4 MG/DL (ref 8.6–10.4)
CHLORIDE SERPL-SCNC: 102 MMOL/L (ref 98–107)
CK SERPL-CCNC: 42 U/L (ref 39–308)
CO2 SERPL-SCNC: 25 MMOL/L (ref 20–31)
COHGB MFR BLD: 1.5 % (ref 0–5)
CREAT SERPL-MCNC: 2.6 MG/DL (ref 0.7–1.2)
CRP SERPL HS-MCNC: 91.9 MG/L (ref 0–5)
DATE LAST DOSE: NORMAL
ECHO BSA: 2.25 M2
EOSINOPHIL # BLD: 0 K/UL (ref 0–0.4)
EOSINOPHILS RELATIVE PERCENT: 0 % (ref 0–4)
ERYTHROCYTE [DISTWIDTH] IN BLOOD BY AUTOMATED COUNT: 17.3 % (ref 11.5–14.9)
ERYTHROCYTE [SEDIMENTATION RATE] IN BLOOD BY PHOTOMETRIC METHOD: 119 MM/HR (ref 0–20)
FIO2 ON VENT: 30 %
GAS FLOW.O2 O2 DELIVERY SYS: ABNORMAL L/MIN
GAS FLOW.O2 SETTING OXYMISER: 24 L/MIN
GFR, ESTIMATED: 27 ML/MIN/1.73M2
GLUCOSE BLD-MCNC: 190 MG/DL (ref 75–110)
GLUCOSE BLD-MCNC: 201 MG/DL (ref 75–110)
GLUCOSE BLD-MCNC: 205 MG/DL (ref 75–110)
GLUCOSE BLD-MCNC: 214 MG/DL (ref 75–110)
GLUCOSE BLD-MCNC: 219 MG/DL (ref 75–110)
GLUCOSE BLD-MCNC: 233 MG/DL (ref 75–110)
GLUCOSE BLD-MCNC: 250 MG/DL (ref 75–110)
GLUCOSE BLD-MCNC: 275 MG/DL (ref 75–110)
GLUCOSE SERPL-MCNC: 259 MG/DL (ref 70–99)
HCO3 ARTERIAL: 27.3 MMOL/L (ref 22–26)
HCT VFR BLD AUTO: 31.2 % (ref 41–53)
HGB BLD-MCNC: 10.1 G/DL (ref 13.5–17.5)
LYMPHOCYTES NFR BLD: 1.72 K/UL (ref 1–4.8)
LYMPHOCYTES RELATIVE PERCENT: 13 % (ref 24–44)
MCH RBC QN AUTO: 26.7 PG (ref 26–34)
MCHC RBC AUTO-ENTMCNC: 32.3 G/DL (ref 31–37)
MCV RBC AUTO: 82.7 FL (ref 80–100)
METAMYELOCYTES ABSOLUTE COUNT: 0.13 K/UL
METAMYELOCYTES: 1 %
METHEMOGLOBIN: 1.4 % (ref 0–1.9)
MONOCYTES NFR BLD: 0.53 K/UL (ref 0.1–1.3)
MONOCYTES NFR BLD: 4 % (ref 1–7)
MORPHOLOGY: ABNORMAL
MORPHOLOGY: ABNORMAL
NEUTROPHILS NFR BLD: 80 % (ref 36–66)
NEUTS SEG NFR BLD: 10.56 K/UL (ref 1.3–9.1)
O2 SAT, ARTERIAL: 96 % (ref 95–98)
PCO2 ARTERIAL: 38.6 MMHG (ref 35–45)
PEEP RESPIRATORY: 8 CM[H2O]
PH ARTERIAL: 7.46 (ref 7.35–7.45)
PLATELET # BLD AUTO: 175 K/UL (ref 150–450)
PMV BLD AUTO: 9.6 FL (ref 6–12)
PO2 ARTERIAL: 114 MMHG (ref 80–100)
POSITIVE BASE EXCESS, ART: 3.4 MMOL/L (ref 0–2)
POTASSIUM SERPL-SCNC: 3.9 MMOL/L (ref 3.7–5.3)
PROT SERPL-MCNC: 8.2 G/DL (ref 6.4–8.3)
PT. POSITION: ABNORMAL
RBC # BLD AUTO: 3.78 M/UL (ref 4.5–5.9)
RESPIRATORY RATE: 24
SODIUM SERPL-SCNC: 135 MMOL/L (ref 135–144)
TEXT FOR RESPIRATORY: ABNORMAL
TME LAST DOSE: 1933 H
TOTAL RATE: 29
VANCOMYCIN DOSE: 1750 MG
VANCOMYCIN SERPL-MCNC: 27.9 UG/ML
VENTILATION MODE VENT: ABNORMAL
VT: 500
WBC OTHER # BLD: 13.2 K/UL (ref 3.5–11)

## 2024-06-29 PROCEDURE — 71045 X-RAY EXAM CHEST 1 VIEW: CPT

## 2024-06-29 PROCEDURE — 36415 COLL VENOUS BLD VENIPUNCTURE: CPT

## 2024-06-29 PROCEDURE — 6370000000 HC RX 637 (ALT 250 FOR IP): Performed by: INTERNAL MEDICINE

## 2024-06-29 PROCEDURE — 2580000003 HC RX 258

## 2024-06-29 PROCEDURE — 94761 N-INVAS EAR/PLS OXIMETRY MLT: CPT

## 2024-06-29 PROCEDURE — 85025 COMPLETE CBC W/AUTO DIFF WBC: CPT

## 2024-06-29 PROCEDURE — 82805 BLOOD GASES W/O2 SATURATION: CPT

## 2024-06-29 PROCEDURE — 6360000002 HC RX W HCPCS: Performed by: ORTHOPAEDIC SURGERY

## 2024-06-29 PROCEDURE — 2580000003 HC RX 258: Performed by: INTERNAL MEDICINE

## 2024-06-29 PROCEDURE — 99232 SBSQ HOSP IP/OBS MODERATE 35: CPT | Performed by: PSYCHIATRY & NEUROLOGY

## 2024-06-29 PROCEDURE — 6360000002 HC RX W HCPCS: Performed by: INTERNAL MEDICINE

## 2024-06-29 PROCEDURE — 94003 VENT MGMT INPAT SUBQ DAY: CPT

## 2024-06-29 PROCEDURE — C9113 INJ PANTOPRAZOLE SODIUM, VIA: HCPCS | Performed by: INTERNAL MEDICINE

## 2024-06-29 PROCEDURE — 73120 X-RAY EXAM OF HAND: CPT

## 2024-06-29 PROCEDURE — 6370000000 HC RX 637 (ALT 250 FOR IP)

## 2024-06-29 PROCEDURE — 80048 BASIC METABOLIC PNL TOTAL CA: CPT

## 2024-06-29 PROCEDURE — 6370000000 HC RX 637 (ALT 250 FOR IP): Performed by: ORTHOPAEDIC SURGERY

## 2024-06-29 PROCEDURE — 99291 CRITICAL CARE FIRST HOUR: CPT | Performed by: INTERNAL MEDICINE

## 2024-06-29 PROCEDURE — 99233 SBSQ HOSP IP/OBS HIGH 50: CPT | Performed by: INTERNAL MEDICINE

## 2024-06-29 PROCEDURE — 80202 ASSAY OF VANCOMYCIN: CPT

## 2024-06-29 PROCEDURE — 51798 US URINE CAPACITY MEASURE: CPT

## 2024-06-29 PROCEDURE — 82550 ASSAY OF CK (CPK): CPT

## 2024-06-29 PROCEDURE — 2580000003 HC RX 258: Performed by: ORTHOPAEDIC SURGERY

## 2024-06-29 PROCEDURE — 85652 RBC SED RATE AUTOMATED: CPT

## 2024-06-29 PROCEDURE — 93971 EXTREMITY STUDY: CPT | Performed by: SURGERY

## 2024-06-29 PROCEDURE — 86140 C-REACTIVE PROTEIN: CPT

## 2024-06-29 PROCEDURE — 82947 ASSAY GLUCOSE BLOOD QUANT: CPT

## 2024-06-29 PROCEDURE — 36600 WITHDRAWAL OF ARTERIAL BLOOD: CPT

## 2024-06-29 PROCEDURE — 2000000000 HC ICU R&B

## 2024-06-29 PROCEDURE — 6360000002 HC RX W HCPCS

## 2024-06-29 PROCEDURE — 70551 MRI BRAIN STEM W/O DYE: CPT

## 2024-06-29 PROCEDURE — 99232 SBSQ HOSP IP/OBS MODERATE 35: CPT | Performed by: INTERNAL MEDICINE

## 2024-06-29 PROCEDURE — 2500000003 HC RX 250 WO HCPCS: Performed by: INTERNAL MEDICINE

## 2024-06-29 PROCEDURE — 2700000000 HC OXYGEN THERAPY PER DAY

## 2024-06-29 PROCEDURE — 80076 HEPATIC FUNCTION PANEL: CPT

## 2024-06-29 RX ORDER — DEXMEDETOMIDINE HYDROCHLORIDE 4 UG/ML
.1-1.5 INJECTION, SOLUTION INTRAVENOUS CONTINUOUS
Status: DISCONTINUED | OUTPATIENT
Start: 2024-06-29 | End: 2024-07-01

## 2024-06-29 RX ADMIN — HEPARIN SODIUM 5000 UNITS: 5000 INJECTION INTRAVENOUS; SUBCUTANEOUS at 00:28

## 2024-06-29 RX ADMIN — CEFEPIME 2000 MG: 2 INJECTION, POWDER, FOR SOLUTION INTRAVENOUS at 12:24

## 2024-06-29 RX ADMIN — CLONIDINE HYDROCHLORIDE 0.1 MG: 0.1 TABLET ORAL at 07:39

## 2024-06-29 RX ADMIN — CLONIDINE HYDROCHLORIDE 0.1 MG: 0.1 TABLET ORAL at 13:54

## 2024-06-29 RX ADMIN — INSULIN LISPRO 4 UNITS: 100 INJECTION, SOLUTION INTRAVENOUS; SUBCUTANEOUS at 20:17

## 2024-06-29 RX ADMIN — INSULIN LISPRO 8 UNITS: 100 INJECTION, SOLUTION INTRAVENOUS; SUBCUTANEOUS at 04:35

## 2024-06-29 RX ADMIN — HEPARIN SODIUM 5000 UNITS: 5000 INJECTION INTRAVENOUS; SUBCUTANEOUS at 07:39

## 2024-06-29 RX ADMIN — FENTANYL CITRATE 50 MCG: 0.05 INJECTION, SOLUTION INTRAMUSCULAR; INTRAVENOUS at 22:09

## 2024-06-29 RX ADMIN — SODIUM CHLORIDE, PRESERVATIVE FREE 10 ML: 5 INJECTION INTRAVENOUS at 07:40

## 2024-06-29 RX ADMIN — HEPARIN SODIUM 5000 UNITS: 5000 INJECTION INTRAVENOUS; SUBCUTANEOUS at 15:57

## 2024-06-29 RX ADMIN — HYDRALAZINE HYDROCHLORIDE 100 MG: 50 TABLET ORAL at 13:53

## 2024-06-29 RX ADMIN — CLONIDINE HYDROCHLORIDE 0.1 MG: 0.1 TABLET ORAL at 21:14

## 2024-06-29 RX ADMIN — ACETAMINOPHEN 650 MG: 325 TABLET ORAL at 12:16

## 2024-06-29 RX ADMIN — INSULIN LISPRO 8 UNITS: 100 INJECTION, SOLUTION INTRAVENOUS; SUBCUTANEOUS at 15:57

## 2024-06-29 RX ADMIN — HYDRALAZINE HYDROCHLORIDE 100 MG: 50 TABLET ORAL at 21:14

## 2024-06-29 RX ADMIN — HYDRALAZINE HYDROCHLORIDE 100 MG: 50 TABLET ORAL at 07:38

## 2024-06-29 RX ADMIN — DEXMEDETOMIDINE HYDROCHLORIDE 0.2 MCG/KG/HR: 400 INJECTION INTRAVENOUS at 15:34

## 2024-06-29 RX ADMIN — METOPROLOL TARTRATE 12.5 MG: 25 TABLET, FILM COATED ORAL at 07:39

## 2024-06-29 RX ADMIN — FENTANYL CITRATE 50 MCG: 0.05 INJECTION, SOLUTION INTRAMUSCULAR; INTRAVENOUS at 10:33

## 2024-06-29 RX ADMIN — PIPERACILLIN AND TAZOBACTAM 3375 MG: 3; .375 INJECTION, POWDER, LYOPHILIZED, FOR SOLUTION INTRAVENOUS at 04:24

## 2024-06-29 RX ADMIN — INSULIN GLARGINE 25 UNITS: 100 INJECTION, SOLUTION SUBCUTANEOUS at 21:15

## 2024-06-29 RX ADMIN — AMLODIPINE BESYLATE 10 MG: 10 TABLET ORAL at 07:39

## 2024-06-29 RX ADMIN — MIDAZOLAM HYDROCHLORIDE 5 MG/HR: 5 INJECTION, SOLUTION INTRAMUSCULAR; INTRAVENOUS at 06:29

## 2024-06-29 RX ADMIN — SODIUM CHLORIDE, PRESERVATIVE FREE 40 MG: 5 INJECTION INTRAVENOUS at 07:38

## 2024-06-29 RX ADMIN — INSULIN LISPRO 4 UNITS: 100 INJECTION, SOLUTION INTRAVENOUS; SUBCUTANEOUS at 07:38

## 2024-06-29 RX ADMIN — INSULIN LISPRO 4 UNITS: 100 INJECTION, SOLUTION INTRAVENOUS; SUBCUTANEOUS at 12:27

## 2024-06-29 ASSESSMENT — PULMONARY FUNCTION TESTS
PIF_VALUE: 27
PIF_VALUE: 31
PIF_VALUE: 31
PIF_VALUE: 32
PIF_VALUE: 28
PIF_VALUE: 27
PIF_VALUE: 27
PIF_VALUE: 31
PIF_VALUE: 31
PIF_VALUE: 30
PIF_VALUE: 33
PIF_VALUE: 25
PIF_VALUE: 27
PIF_VALUE: 37
PIF_VALUE: 29
PIF_VALUE: 27
PIF_VALUE: 30
PIF_VALUE: 30
PIF_VALUE: 31
PIF_VALUE: 29
PIF_VALUE: 33
PIF_VALUE: 27
PIF_VALUE: 31
PIF_VALUE: 28
PIF_VALUE: 31
PIF_VALUE: 35
PIF_VALUE: 27
PIF_VALUE: 29
PIF_VALUE: 28
PIF_VALUE: 24
PIF_VALUE: 32
PIF_VALUE: 29
PIF_VALUE: 27
PIF_VALUE: 31
PIF_VALUE: 27
PIF_VALUE: 27
PIF_VALUE: 31
PIF_VALUE: 30
PIF_VALUE: 31
PIF_VALUE: 31
PIF_VALUE: 27
PIF_VALUE: 31
PIF_VALUE: 35

## 2024-06-30 ENCOUNTER — APPOINTMENT (OUTPATIENT)
Dept: CT IMAGING | Age: 64
DRG: 870 | End: 2024-06-30
Payer: MEDICARE

## 2024-06-30 ENCOUNTER — APPOINTMENT (OUTPATIENT)
Dept: GENERAL RADIOLOGY | Age: 64
DRG: 870 | End: 2024-06-30
Payer: MEDICARE

## 2024-06-30 PROBLEM — I63.9 OCCIPITAL STROKE (HCC): Status: ACTIVE | Noted: 2024-06-30

## 2024-06-30 LAB
ANION GAP SERPL CALCULATED.3IONS-SCNC: 10 MMOL/L (ref 9–17)
ARTERIAL PATENCY WRIST A: ABNORMAL
BASOPHILS # BLD: 0 K/UL (ref 0–0.2)
BASOPHILS NFR BLD: 0 % (ref 0–2)
BDY SITE: ABNORMAL
BODY TEMPERATURE: 37
BUN SERPL-MCNC: 81 MG/DL (ref 8–23)
CALCIUM SERPL-MCNC: 8 MG/DL (ref 8.6–10.4)
CHLORIDE SERPL-SCNC: 105 MMOL/L (ref 98–107)
CO2 SERPL-SCNC: 23 MMOL/L (ref 20–31)
COHGB MFR BLD: 1.4 % (ref 0–5)
CREAT SERPL-MCNC: 3.3 MG/DL (ref 0.7–1.2)
CRP SERPL HS-MCNC: 103.8 MG/L (ref 0–5)
DATE LAST DOSE: NORMAL
EOSINOPHIL # BLD: 0.1 K/UL (ref 0–0.4)
EOSINOPHILS RELATIVE PERCENT: 1 % (ref 0–4)
ERYTHROCYTE [DISTWIDTH] IN BLOOD BY AUTOMATED COUNT: 17.1 % (ref 11.5–14.9)
ERYTHROCYTE [SEDIMENTATION RATE] IN BLOOD BY PHOTOMETRIC METHOD: 110 MM/HR (ref 0–20)
FIO2 ON VENT: 30 %
GAS FLOW.O2 O2 DELIVERY SYS: ABNORMAL L/MIN
GAS FLOW.O2 SETTING OXYMISER: 24 L/MIN
GFR, ESTIMATED: 20 ML/MIN/1.73M2
GLUCOSE BLD-MCNC: 177 MG/DL (ref 75–110)
GLUCOSE BLD-MCNC: 223 MG/DL (ref 75–110)
GLUCOSE BLD-MCNC: 230 MG/DL (ref 75–110)
GLUCOSE BLD-MCNC: 237 MG/DL (ref 75–110)
GLUCOSE BLD-MCNC: 239 MG/DL (ref 75–110)
GLUCOSE SERPL-MCNC: 254 MG/DL (ref 70–99)
HCO3 ARTERIAL: 22.1 MMOL/L (ref 22–26)
HCT VFR BLD AUTO: 30.3 % (ref 41–53)
HGB BLD-MCNC: 9.8 G/DL (ref 13.5–17.5)
LYMPHOCYTES NFR BLD: 1.5 K/UL (ref 1–4.8)
LYMPHOCYTES RELATIVE PERCENT: 11 % (ref 24–44)
MCH RBC QN AUTO: 26.9 PG (ref 26–34)
MCHC RBC AUTO-ENTMCNC: 32.3 G/DL (ref 31–37)
MCV RBC AUTO: 83.5 FL (ref 80–100)
METHEMOGLOBIN: 0.1 % (ref 0–1.9)
MONOCYTES NFR BLD: 0.6 K/UL (ref 0.1–1.3)
MONOCYTES NFR BLD: 5 % (ref 1–7)
NEGATIVE BASE EXCESS, ART: 1.3 MMOL/L (ref 0–2)
NEUTROPHILS NFR BLD: 83 % (ref 36–66)
NEUTS SEG NFR BLD: 11.2 K/UL (ref 1.3–9.1)
O2 SAT, ARTERIAL: 97.5 % (ref 95–98)
PCO2 ARTERIAL: 32.9 MMHG (ref 35–45)
PEEP RESPIRATORY: 8 CM[H2O]
PH ARTERIAL: 7.45 (ref 7.35–7.45)
PLATELET # BLD AUTO: 215 K/UL (ref 150–450)
PMV BLD AUTO: 9.4 FL (ref 6–12)
PO2 ARTERIAL: 91 MMHG (ref 80–100)
POTASSIUM SERPL-SCNC: 4.3 MMOL/L (ref 3.7–5.3)
PT. POSITION: ABNORMAL
RBC # BLD AUTO: 3.63 M/UL (ref 4.5–5.9)
SODIUM SERPL-SCNC: 138 MMOL/L (ref 135–144)
TEXT FOR RESPIRATORY: ABNORMAL
TME LAST DOSE: 1953 H
TOTAL RATE: 26
TRIGL SERPL-MCNC: 306 MG/DL
VANCOMYCIN DOSE: 1750 MG
VANCOMYCIN SERPL-MCNC: 20.2 UG/ML
VENTILATION MODE VENT: ABNORMAL
VT: 500
WBC OTHER # BLD: 13.4 K/UL (ref 3.5–11)

## 2024-06-30 PROCEDURE — C9113 INJ PANTOPRAZOLE SODIUM, VIA: HCPCS | Performed by: INTERNAL MEDICINE

## 2024-06-30 PROCEDURE — 82947 ASSAY GLUCOSE BLOOD QUANT: CPT

## 2024-06-30 PROCEDURE — 82805 BLOOD GASES W/O2 SATURATION: CPT

## 2024-06-30 PROCEDURE — 87205 SMEAR GRAM STAIN: CPT

## 2024-06-30 PROCEDURE — 94761 N-INVAS EAR/PLS OXIMETRY MLT: CPT

## 2024-06-30 PROCEDURE — 6360000002 HC RX W HCPCS: Performed by: INTERNAL MEDICINE

## 2024-06-30 PROCEDURE — 2000000000 HC ICU R&B

## 2024-06-30 PROCEDURE — 2580000003 HC RX 258: Performed by: INTERNAL MEDICINE

## 2024-06-30 PROCEDURE — 6370000000 HC RX 637 (ALT 250 FOR IP)

## 2024-06-30 PROCEDURE — 36415 COLL VENOUS BLD VENIPUNCTURE: CPT

## 2024-06-30 PROCEDURE — 73700 CT LOWER EXTREMITY W/O DYE: CPT

## 2024-06-30 PROCEDURE — 80202 ASSAY OF VANCOMYCIN: CPT

## 2024-06-30 PROCEDURE — 86140 C-REACTIVE PROTEIN: CPT

## 2024-06-30 PROCEDURE — 36600 WITHDRAWAL OF ARTERIAL BLOOD: CPT

## 2024-06-30 PROCEDURE — 73560 X-RAY EXAM OF KNEE 1 OR 2: CPT

## 2024-06-30 PROCEDURE — 87070 CULTURE OTHR SPECIMN AEROBIC: CPT

## 2024-06-30 PROCEDURE — 2580000003 HC RX 258

## 2024-06-30 PROCEDURE — 85025 COMPLETE CBC W/AUTO DIFF WBC: CPT

## 2024-06-30 PROCEDURE — 85652 RBC SED RATE AUTOMATED: CPT

## 2024-06-30 PROCEDURE — 84478 ASSAY OF TRIGLYCERIDES: CPT

## 2024-06-30 PROCEDURE — 87077 CULTURE AEROBIC IDENTIFY: CPT

## 2024-06-30 PROCEDURE — 6370000000 HC RX 637 (ALT 250 FOR IP): Performed by: INTERNAL MEDICINE

## 2024-06-30 PROCEDURE — 6360000002 HC RX W HCPCS

## 2024-06-30 PROCEDURE — 71045 X-RAY EXAM CHEST 1 VIEW: CPT

## 2024-06-30 PROCEDURE — 73200 CT UPPER EXTREMITY W/O DYE: CPT

## 2024-06-30 PROCEDURE — 2500000003 HC RX 250 WO HCPCS: Performed by: INTERNAL MEDICINE

## 2024-06-30 PROCEDURE — 99232 SBSQ HOSP IP/OBS MODERATE 35: CPT | Performed by: INTERNAL MEDICINE

## 2024-06-30 PROCEDURE — 6370000000 HC RX 637 (ALT 250 FOR IP): Performed by: ORTHOPAEDIC SURGERY

## 2024-06-30 PROCEDURE — 99291 CRITICAL CARE FIRST HOUR: CPT | Performed by: INTERNAL MEDICINE

## 2024-06-30 PROCEDURE — 94003 VENT MGMT INPAT SUBQ DAY: CPT

## 2024-06-30 PROCEDURE — 99233 SBSQ HOSP IP/OBS HIGH 50: CPT | Performed by: PSYCHIATRY & NEUROLOGY

## 2024-06-30 PROCEDURE — 80048 BASIC METABOLIC PNL TOTAL CA: CPT

## 2024-06-30 PROCEDURE — 99233 SBSQ HOSP IP/OBS HIGH 50: CPT | Performed by: INTERNAL MEDICINE

## 2024-06-30 PROCEDURE — 6360000002 HC RX W HCPCS: Performed by: ORTHOPAEDIC SURGERY

## 2024-06-30 PROCEDURE — 2700000000 HC OXYGEN THERAPY PER DAY

## 2024-06-30 PROCEDURE — 87075 CULTR BACTERIA EXCEPT BLOOD: CPT

## 2024-06-30 RX ORDER — INSULIN GLARGINE 100 [IU]/ML
20 INJECTION, SOLUTION SUBCUTANEOUS 2 TIMES DAILY
Status: DISCONTINUED | OUTPATIENT
Start: 2024-06-30 | End: 2024-06-30

## 2024-06-30 RX ORDER — ASPIRIN 81 MG/1
81 TABLET, CHEWABLE ORAL DAILY
Status: DISCONTINUED | OUTPATIENT
Start: 2024-06-30 | End: 2024-07-01

## 2024-06-30 RX ORDER — CLONIDINE HYDROCHLORIDE 0.2 MG/1
0.2 TABLET ORAL 3 TIMES DAILY
Status: DISCONTINUED | OUTPATIENT
Start: 2024-06-30 | End: 2024-07-01

## 2024-06-30 RX ORDER — INSULIN GLARGINE 100 [IU]/ML
22 INJECTION, SOLUTION SUBCUTANEOUS 2 TIMES DAILY
Status: DISCONTINUED | OUTPATIENT
Start: 2024-06-30 | End: 2024-07-01

## 2024-06-30 RX ADMIN — DAPTOMYCIN 500 MG: 500 INJECTION, POWDER, LYOPHILIZED, FOR SOLUTION INTRAVENOUS at 14:55

## 2024-06-30 RX ADMIN — INSULIN GLARGINE 22 UNITS: 100 INJECTION, SOLUTION SUBCUTANEOUS at 21:39

## 2024-06-30 RX ADMIN — INSULIN LISPRO 4 UNITS: 100 INJECTION, SOLUTION INTRAVENOUS; SUBCUTANEOUS at 08:15

## 2024-06-30 RX ADMIN — INSULIN LISPRO 4 UNITS: 100 INJECTION, SOLUTION INTRAVENOUS; SUBCUTANEOUS at 20:58

## 2024-06-30 RX ADMIN — ACETAMINOPHEN 650 MG: 325 TABLET ORAL at 08:13

## 2024-06-30 RX ADMIN — INSULIN LISPRO 4 UNITS: 100 INJECTION, SOLUTION INTRAVENOUS; SUBCUTANEOUS at 00:18

## 2024-06-30 RX ADMIN — DEXMEDETOMIDINE HYDROCHLORIDE 0.3 MCG/KG/HR: 400 INJECTION INTRAVENOUS at 12:37

## 2024-06-30 RX ADMIN — INSULIN LISPRO 4 UNITS: 100 INJECTION, SOLUTION INTRAVENOUS; SUBCUTANEOUS at 04:20

## 2024-06-30 RX ADMIN — HEPARIN SODIUM 5000 UNITS: 5000 INJECTION INTRAVENOUS; SUBCUTANEOUS at 00:19

## 2024-06-30 RX ADMIN — HEPARIN SODIUM 5000 UNITS: 5000 INJECTION INTRAVENOUS; SUBCUTANEOUS at 16:00

## 2024-06-30 RX ADMIN — CLONIDINE HYDROCHLORIDE 0.2 MG: 0.2 TABLET ORAL at 20:59

## 2024-06-30 RX ADMIN — HEPARIN SODIUM 5000 UNITS: 5000 INJECTION INTRAVENOUS; SUBCUTANEOUS at 08:16

## 2024-06-30 RX ADMIN — INSULIN GLARGINE 20 UNITS: 100 INJECTION, SOLUTION SUBCUTANEOUS at 08:57

## 2024-06-30 RX ADMIN — ASPIRIN 81 MG 81 MG: 81 TABLET ORAL at 14:57

## 2024-06-30 RX ADMIN — CLONIDINE HYDROCHLORIDE 0.2 MG: 0.2 TABLET ORAL at 14:50

## 2024-06-30 RX ADMIN — CEFEPIME 1000 MG: 1 INJECTION, POWDER, FOR SOLUTION INTRAMUSCULAR; INTRAVENOUS at 12:43

## 2024-06-30 RX ADMIN — INSULIN LISPRO 4 UNITS: 100 INJECTION, SOLUTION INTRAVENOUS; SUBCUTANEOUS at 12:40

## 2024-06-30 RX ADMIN — SODIUM CHLORIDE, PRESERVATIVE FREE 40 MG: 5 INJECTION INTRAVENOUS at 08:16

## 2024-06-30 ASSESSMENT — PULMONARY FUNCTION TESTS
PIF_VALUE: 26
PIF_VALUE: 26
PIF_VALUE: 25
PIF_VALUE: 22
PIF_VALUE: 19
PIF_VALUE: 22
PIF_VALUE: 25
PIF_VALUE: 25
PIF_VALUE: 27
PIF_VALUE: 26
PIF_VALUE: 26
PIF_VALUE: 22
PIF_VALUE: 24
PIF_VALUE: 26
PIF_VALUE: 22
PIF_VALUE: 22
PIF_VALUE: 23
PIF_VALUE: 26
PIF_VALUE: 25
PIF_VALUE: 25
PIF_VALUE: 26
PIF_VALUE: 25
PIF_VALUE: 26
PIF_VALUE: 26
PIF_VALUE: 25
PIF_VALUE: 26
PIF_VALUE: 25
PIF_VALUE: 26
PIF_VALUE: 27
PIF_VALUE: 22
PIF_VALUE: 25
PIF_VALUE: 25
PIF_VALUE: 22
PIF_VALUE: 23
PIF_VALUE: 26
PIF_VALUE: 23
PIF_VALUE: 25
PIF_VALUE: 26
PIF_VALUE: 25
PIF_VALUE: 23
PIF_VALUE: 26
PIF_VALUE: 25
PIF_VALUE: 26
PIF_VALUE: 26
PIF_VALUE: 24
PIF_VALUE: 26
PIF_VALUE: 25
PIF_VALUE: 24
PIF_VALUE: 25
PIF_VALUE: 24
PIF_VALUE: 25
PIF_VALUE: 23
PIF_VALUE: 25
PIF_VALUE: 25
PIF_VALUE: 24
PIF_VALUE: 25
PIF_VALUE: 25
PIF_VALUE: 23
PIF_VALUE: 25
PIF_VALUE: 21
PIF_VALUE: 22
PIF_VALUE: 23
PIF_VALUE: 24
PIF_VALUE: 24
PIF_VALUE: 25
PIF_VALUE: 22
PIF_VALUE: 26
PIF_VALUE: 25
PIF_VALUE: 24
PIF_VALUE: 27
PIF_VALUE: 26
PIF_VALUE: 25
PIF_VALUE: 28
PIF_VALUE: 26
PIF_VALUE: 26
PIF_VALUE: 22
PIF_VALUE: 25

## 2024-07-01 ENCOUNTER — APPOINTMENT (OUTPATIENT)
Dept: GENERAL RADIOLOGY | Age: 64
DRG: 870 | End: 2024-07-01
Payer: MEDICARE

## 2024-07-01 VITALS
SYSTOLIC BLOOD PRESSURE: 190 MMHG | DIASTOLIC BLOOD PRESSURE: 66 MMHG | OXYGEN SATURATION: 100 % | BODY MASS INDEX: 31.48 KG/M2 | HEART RATE: 88 BPM | RESPIRATION RATE: 42 BRPM | WEIGHT: 224.87 LBS | TEMPERATURE: 99.9 F | HEIGHT: 71 IN

## 2024-07-01 LAB
ANION GAP SERPL CALCULATED.3IONS-SCNC: 11 MMOL/L (ref 9–17)
ARTERIAL PATENCY WRIST A: NORMAL
BASOPHILS # BLD: 0 K/UL (ref 0–0.2)
BASOPHILS NFR BLD: 0 % (ref 0–2)
BDY SITE: NORMAL
BUN SERPL-MCNC: 85 MG/DL (ref 8–23)
CALCIUM SERPL-MCNC: 8.3 MG/DL (ref 8.6–10.4)
CHLORIDE SERPL-SCNC: 106 MMOL/L (ref 98–107)
CHOLEST SERPL-MCNC: 108 MG/DL
CHOLESTEROL/HDL RATIO: 6.4
CO2 SERPL-SCNC: 22 MMOL/L (ref 20–31)
COHGB MFR BLD: 1.3 % (ref 0–5)
CREAT SERPL-MCNC: 3.5 MG/DL (ref 0.7–1.2)
EOSINOPHIL # BLD: 0.1 K/UL (ref 0–0.4)
EOSINOPHILS RELATIVE PERCENT: 1 % (ref 0–4)
ERYTHROCYTE [DISTWIDTH] IN BLOOD BY AUTOMATED COUNT: 17.1 % (ref 11.5–14.9)
ERYTHROCYTE [SEDIMENTATION RATE] IN BLOOD BY PHOTOMETRIC METHOD: 85 MM/HR (ref 0–20)
FIO2 ON VENT: 30 %
GAS FLOW.O2 O2 DELIVERY SYS: NORMAL L/MIN
GAS FLOW.O2 SETTING OXYMISER: 24 L/MIN
GFR, ESTIMATED: 19 ML/MIN/1.73M2
GLUCOSE BLD-MCNC: 217 MG/DL (ref 75–110)
GLUCOSE BLD-MCNC: 220 MG/DL (ref 75–110)
GLUCOSE BLD-MCNC: 238 MG/DL (ref 75–110)
GLUCOSE SERPL-MCNC: 249 MG/DL (ref 70–99)
HCO3 ARTERIAL: 23.9 MMOL/L (ref 22–26)
HCT VFR BLD AUTO: 29.1 % (ref 41–53)
HDLC SERPL-MCNC: 17 MG/DL
HGB BLD-MCNC: 9.4 G/DL (ref 13.5–17.5)
ITYP INTERPRETATION: NORMAL
LDLC SERPL CALC-MCNC: 38 MG/DL (ref 0–130)
LYMPHOCYTES NFR BLD: 1.5 K/UL (ref 1–4.8)
LYMPHOCYTES RELATIVE PERCENT: 11 % (ref 24–44)
MCH RBC QN AUTO: 27.1 PG (ref 26–34)
MCHC RBC AUTO-ENTMCNC: 32.3 G/DL (ref 31–37)
MCV RBC AUTO: 83.8 FL (ref 80–100)
METHEMOGLOBIN: 0.3 % (ref 0–1.9)
MONOCYTES NFR BLD: 0.6 K/UL (ref 0.1–1.3)
MONOCYTES NFR BLD: 4 % (ref 1–7)
NEUTROPHILS NFR BLD: 84 % (ref 36–66)
NEUTS SEG NFR BLD: 12 K/UL (ref 1.3–9.1)
O2 SAT, ARTERIAL: 96.5 % (ref 95–98)
P E INTERPRETATION, U: NORMAL
PATHOLOGIST REVIEW: NORMAL
PATHOLOGIST: NORMAL
PCO2 ARTERIAL: 35.7 MMHG (ref 35–45)
PEEP RESPIRATORY: 8 CM[H2O]
PH ARTERIAL: 7.44 (ref 7.35–7.45)
PLATELET # BLD AUTO: 233 K/UL (ref 150–450)
PMV BLD AUTO: 9.3 FL (ref 6–12)
PO2 ARTERIAL: 98.2 MMHG (ref 80–100)
POSITIVE BASE EXCESS, ART: 0.1 MMOL/L (ref 0–2)
POTASSIUM SERPL-SCNC: 4.1 MMOL/L (ref 3.7–5.3)
PT. POSITION: NORMAL
RBC # BLD AUTO: 3.47 M/UL (ref 4.5–5.9)
RESPIRATORY RATE: 24
SODIUM SERPL-SCNC: 139 MMOL/L (ref 135–144)
SPECIMEN TYPE: NORMAL
SPECIMEN TYPE: NORMAL
SPECIMEN VOL UR: NORMAL ML
TEXT FOR RESPIRATORY: NORMAL
TOTAL PROTEIN, URINE: 127 MG/DL
TOTAL RATE: 24
TRIGL SERPL-MCNC: 266 MG/DL
URINE TOTAL PROTEIN: 130 MG/DL
VENTILATION MODE VENT: NORMAL
VT: 500
WBC OTHER # BLD: 14.2 K/UL (ref 3.5–11)

## 2024-07-01 PROCEDURE — 2700000000 HC OXYGEN THERAPY PER DAY

## 2024-07-01 PROCEDURE — 80048 BASIC METABOLIC PNL TOTAL CA: CPT

## 2024-07-01 PROCEDURE — 6370000000 HC RX 637 (ALT 250 FOR IP)

## 2024-07-01 PROCEDURE — 6360000002 HC RX W HCPCS: Performed by: ORTHOPAEDIC SURGERY

## 2024-07-01 PROCEDURE — 94003 VENT MGMT INPAT SUBQ DAY: CPT

## 2024-07-01 PROCEDURE — 85652 RBC SED RATE AUTOMATED: CPT

## 2024-07-01 PROCEDURE — 6360000002 HC RX W HCPCS: Performed by: NURSE PRACTITIONER

## 2024-07-01 PROCEDURE — 82805 BLOOD GASES W/O2 SATURATION: CPT

## 2024-07-01 PROCEDURE — 36415 COLL VENOUS BLD VENIPUNCTURE: CPT

## 2024-07-01 PROCEDURE — 71045 X-RAY EXAM CHEST 1 VIEW: CPT

## 2024-07-01 PROCEDURE — 2580000003 HC RX 258: Performed by: INTERNAL MEDICINE

## 2024-07-01 PROCEDURE — 80061 LIPID PANEL: CPT

## 2024-07-01 PROCEDURE — 6360000002 HC RX W HCPCS: Performed by: INTERNAL MEDICINE

## 2024-07-01 PROCEDURE — 85025 COMPLETE CBC W/AUTO DIFF WBC: CPT

## 2024-07-01 PROCEDURE — 99232 SBSQ HOSP IP/OBS MODERATE 35: CPT | Performed by: INTERNAL MEDICINE

## 2024-07-01 PROCEDURE — 6370000000 HC RX 637 (ALT 250 FOR IP): Performed by: INTERNAL MEDICINE

## 2024-07-01 PROCEDURE — 99233 SBSQ HOSP IP/OBS HIGH 50: CPT | Performed by: INTERNAL MEDICINE

## 2024-07-01 PROCEDURE — 82947 ASSAY GLUCOSE BLOOD QUANT: CPT

## 2024-07-01 PROCEDURE — 2580000003 HC RX 258: Performed by: ORTHOPAEDIC SURGERY

## 2024-07-01 PROCEDURE — 2500000003 HC RX 250 WO HCPCS: Performed by: INTERNAL MEDICINE

## 2024-07-01 RX ORDER — GLYCOPYRROLATE 0.2 MG/ML
0.1 INJECTION INTRAMUSCULAR; INTRAVENOUS EVERY 6 HOURS PRN
Status: DISCONTINUED | OUTPATIENT
Start: 2024-07-01 | End: 2024-07-01 | Stop reason: HOSPADM

## 2024-07-01 RX ORDER — LORAZEPAM 2 MG/ML
2 INJECTION INTRAMUSCULAR
Status: DISCONTINUED | OUTPATIENT
Start: 2024-07-01 | End: 2024-07-01 | Stop reason: HOSPADM

## 2024-07-01 RX ORDER — MORPHINE SULFATE 4 MG/ML
4 INJECTION, SOLUTION INTRAMUSCULAR; INTRAVENOUS
Status: DISCONTINUED | OUTPATIENT
Start: 2024-07-01 | End: 2024-07-01 | Stop reason: HOSPADM

## 2024-07-01 RX ADMIN — INSULIN LISPRO 4 UNITS: 100 INJECTION, SOLUTION INTRAVENOUS; SUBCUTANEOUS at 00:51

## 2024-07-01 RX ADMIN — LORAZEPAM 2 MG: 2 INJECTION INTRAMUSCULAR; INTRAVENOUS at 11:57

## 2024-07-01 RX ADMIN — MORPHINE SULFATE 4 MG: 4 INJECTION, SOLUTION INTRAMUSCULAR; INTRAVENOUS at 11:05

## 2024-07-01 RX ADMIN — SODIUM CHLORIDE, PRESERVATIVE FREE 10 ML: 5 INJECTION INTRAVENOUS at 08:20

## 2024-07-01 RX ADMIN — CLONIDINE HYDROCHLORIDE 0.2 MG: 0.2 TABLET ORAL at 08:12

## 2024-07-01 RX ADMIN — DEXMEDETOMIDINE HYDROCHLORIDE 0.1 MCG/KG/HR: 400 INJECTION INTRAVENOUS at 05:04

## 2024-07-01 RX ADMIN — INSULIN LISPRO 4 UNITS: 100 INJECTION, SOLUTION INTRAVENOUS; SUBCUTANEOUS at 08:13

## 2024-07-01 RX ADMIN — HEPARIN SODIUM 5000 UNITS: 5000 INJECTION INTRAVENOUS; SUBCUTANEOUS at 00:31

## 2024-07-01 RX ADMIN — MORPHINE SULFATE 4 MG: 4 INJECTION, SOLUTION INTRAMUSCULAR; INTRAVENOUS at 15:16

## 2024-07-01 RX ADMIN — HEPARIN SODIUM 5000 UNITS: 5000 INJECTION INTRAVENOUS; SUBCUTANEOUS at 08:14

## 2024-07-01 RX ADMIN — INSULIN GLARGINE 22 UNITS: 100 INJECTION, SOLUTION SUBCUTANEOUS at 08:13

## 2024-07-01 RX ADMIN — SODIUM CHLORIDE, PRESERVATIVE FREE 40 MG: 5 INJECTION INTRAVENOUS at 08:19

## 2024-07-01 RX ADMIN — ASPIRIN 81 MG 81 MG: 81 TABLET ORAL at 08:13

## 2024-07-01 RX ADMIN — METOPROLOL TARTRATE 12.5 MG: 25 TABLET, FILM COATED ORAL at 08:28

## 2024-07-01 RX ADMIN — INSULIN LISPRO 4 UNITS: 100 INJECTION, SOLUTION INTRAVENOUS; SUBCUTANEOUS at 04:36

## 2024-07-01 ASSESSMENT — PULMONARY FUNCTION TESTS
PIF_VALUE: 20
PIF_VALUE: 24
PIF_VALUE: 25
PIF_VALUE: 22
PIF_VALUE: 26
PIF_VALUE: 22
PIF_VALUE: 15
PIF_VALUE: 25
PIF_VALUE: 21
PIF_VALUE: 23
PIF_VALUE: 24
PIF_VALUE: 22
PIF_VALUE: 24
PIF_VALUE: 23
PIF_VALUE: 22
PIF_VALUE: 14
PIF_VALUE: 25
PIF_VALUE: 23
PIF_VALUE: 25
PIF_VALUE: 22
PIF_VALUE: 22
PIF_VALUE: 26
PIF_VALUE: 26
PIF_VALUE: 25
PIF_VALUE: 25
PIF_VALUE: 20
PIF_VALUE: 22
PIF_VALUE: 25
PIF_VALUE: 26
PIF_VALUE: 25
PIF_VALUE: 22
PIF_VALUE: 15
PIF_VALUE: 23
PIF_VALUE: 24
PIF_VALUE: 25
PIF_VALUE: 25
PIF_VALUE: 24
PIF_VALUE: 23
PIF_VALUE: 25
PIF_VALUE: 21
PIF_VALUE: 24

## 2024-07-01 NOTE — PALLIATIVE CARE
..PALLIATIVE CARE TEAM    Patient: Ramon Roger  Room: 2008/2008-01    Reason For Consult   Goals of care evaluation  Distress management  Symptom Management  Guidance and support  Facilitate communications  Assistance in coordinating care  Recommendations for the above    Impression: Ramon Roger is a 63 y.o. year old male with  has a past medical history of Adenomatous polyp of colon, ALT (SGPT) level raised, Asbestos exposure, Asthma, BPH (benign prostatic hyperplasia), Diabetes mellitus (HCC), ED (erectile dysfunction), Esophageal reflux, Hearing loss, History of depression, Lumbar radiculopathy, Neuropathy, Osteoarthritis, and Tinnitus of left ear..  Currently hospitalized for the management of Osteomyelitis.  The Palliative Care Team is following to assist with family support.     Code Status  [id number]  PLAN :   - family is ready to terminally wean the patient   - I talk to son Barrie SALDANA and 3 daughters, and they are all in agreement with the plan to focus on comfort for the patient as they know this would be his wishes  - will follow closely for family support.    Vital Signs:  BP (!) 183/64   Pulse 78   Temp 99.9 °F (37.7 °C) (Oral)   Resp (!) 31   Ht 1.803 m (5' 11\")   Wt 102 kg (224 lb 13.9 oz)   SpO2 100%   BMI 31.36 kg/m²     Patient Active Problem List   Diagnosis    Type 1 diabetes mellitus with diabetic mononeuropathy (HCC)    Essential hypertension    Mixed hyperlipidemia    Diabetic mononeuropathy associated with type 1 diabetes mellitus (HCC)    Lung nodule    Chronic low back pain    Chronic right shoulder pain    Bradycardia    Osteomyelitis (HCC)    Traumatic rhabdomyolysis (HCC)    Acute encephalopathy    Pyogenic arthritis of left knee joint (HCC)    Acute renal failure (HCC)    Atherosclerosis of native arteries of left leg with ulceration of other part of foot (HCC)    Severe sepsis (HCC)    Wound infection    Type 2 diabetes mellitus with left diabetic foot infection (HCC)

## 2024-07-01 NOTE — PLAN OF CARE
Problem: Discharge Planning  Goal: Discharge to home or other facility with appropriate resources  6/22/2024 0344 by Basilia Gallego RN  Flowsheets (Taken 6/22/2024 0344)  Discharge to home or other facility with appropriate resources: Identify barriers to discharge with patient and caregiver  Note: Dependent on pt getting off vent     Problem: Safety - Adult  Goal: Free from fall injury  6/22/2024 0344 by Basilia Gallego RN  Outcome: Progressing  Note: Safety maintained     Problem: Safety - Medical Restraint  Goal: Remains free of injury from restraints (Restraint for Interference with Medical Device)  Description: INTERVENTIONS:  1. Determine that other, less restrictive measures have been tried or would not be effective before applying the restraint  2. Evaluate the patient's condition at the time of restraint application  3. Inform patient/family regarding the reason for restraint  4. Q2H: Monitor safety, psychosocial status, comfort, nutrition and hydration  6/22/2024 0344 by Basilia Gallego RN  Outcome: Progressing  Flowsheets (Taken 6/22/2024 0344)  Remains free of injury from restraints (restraint for interference with medical device):   Determine that other, less restrictive measures have been tried or would not be effective before applying the restraint   Evaluate the patient's condition at the time of restraint application   Every 2 hours: Monitor safety, psychosocial status, comfort, nutrition and hydration   Inform patient/family regarding the reason for restraint  Note: Bilat soft wrist restraints continued to prevent accidental dislodging of ET, safety maintained     Problem: ABCDS Injury Assessment  Goal: Absence of physical injury  Outcome: Progressing     Problem: Skin/Tissue Integrity  Goal: Absence of new skin breakdown  Description: 1.  Monitor for areas of redness and/or skin breakdown  2.  Assess vascular access sites hourly  3.  Every 4-6 hours minimum:  Change oxygen saturation probe 
  Problem: Discharge Planning  Goal: Discharge to home or other facility with appropriate resources  6/22/2024 1622 by Edilma Quevedo RN  Outcome: Progressing  6/22/2024 0344 by Basilia Gallego RN  Flowsheets (Taken 6/22/2024 0344)  Discharge to home or other facility with appropriate resources: Identify barriers to discharge with patient and caregiver  Note: Dependent on pt getting off vent     Problem: Safety - Adult  Goal: Free from fall injury  6/22/2024 1622 by Edilma Quevedo RN  Outcome: Progressing  6/22/2024 0344 by Basilia Gallego RN  Outcome: Progressing  Note: Safety maintained     Problem: Safety - Medical Restraint  Goal: Remains free of injury from restraints (Restraint for Interference with Medical Device)  Description: INTERVENTIONS:  1. Determine that other, less restrictive measures have been tried or would not be effective before applying the restraint  2. Evaluate the patient's condition at the time of restraint application  3. Inform patient/family regarding the reason for restraint  4. Q2H: Monitor safety, psychosocial status, comfort, nutrition and hydration  6/22/2024 1622 by Edilma Quevedo RN  Outcome: Progressing  6/22/2024 0344 by Basilia Gallego RN  Outcome: Progressing  Flowsheets (Taken 6/22/2024 0344)  Remains free of injury from restraints (restraint for interference with medical device):   Determine that other, less restrictive measures have been tried or would not be effective before applying the restraint   Evaluate the patient's condition at the time of restraint application   Every 2 hours: Monitor safety, psychosocial status, comfort, nutrition and hydration   Inform patient/family regarding the reason for restraint  Note: Bilat soft wrist restraints continued to prevent accidental dislodging of ET, safety maintained     Problem: ABCDS Injury Assessment  Goal: Absence of physical injury  6/22/2024 1622 by Edilma Quevedo RN  Outcome: Progressing  6/22/2024 0344 by Basilia Gallego 
  Problem: Discharge Planning  Goal: Discharge to home or other facility with appropriate resources  6/24/2024 0518 by Eri Flannery RN  Outcome: Progressing  6/23/2024 1918 by Edilma Quevedo RN  Outcome: Progressing     Problem: Safety - Adult  Goal: Free from fall injury  6/24/2024 0518 by Eri Flannery RN  Outcome: Progressing  6/23/2024 1918 by Edilma Quevedo RN  Outcome: Progressing     Problem: Safety - Medical Restraint  Goal: Remains free of injury from restraints (Restraint for Interference with Medical Device)  Description: INTERVENTIONS:  1. Determine that other, less restrictive measures have been tried or would not be effective before applying the restraint  2. Evaluate the patient's condition at the time of restraint application  3. Inform patient/family regarding the reason for restraint  4. Q2H: Monitor safety, psychosocial status, comfort, nutrition and hydration  6/24/2024 0518 by Eri Flannery RN  Outcome: Progressing  Flowsheets (Taken 6/23/2024 2000)  Remains free of injury from restraints (restraint for interference with medical device):   Determine that other, less restrictive measures have been tried or would not be effective before applying the restraint   Inform patient/family regarding the reason for restraint   Evaluate the patient's condition at the time of restraint application   Every 2 hours: Monitor safety, psychosocial status, comfort, nutrition and hydration  6/23/2024 1918 by Edilma Quevedo RN  Outcome: Progressing     Problem: ABCDS Injury Assessment  Goal: Absence of physical injury  6/24/2024 0518 by Eri Flannery RN  Outcome: Progressing  6/23/2024 1918 by Edilma Quevedo RN  Outcome: Progressing     Problem: Skin/Tissue Integrity  Goal: Absence of new skin breakdown  Description: 1.  Monitor for areas of redness and/or skin breakdown  2.  Assess vascular access sites hourly  3.  Every 4-6 hours minimum:  Change oxygen saturation probe site  4.  Every 4-6 hours:  If on 
  Problem: Discharge Planning  Goal: Discharge to home or other facility with appropriate resources  6/24/2024 1627 by Edilma Quevedo RN  Outcome: Progressing  6/24/2024 0518 by Eri Flannery RN  Outcome: Progressing     Problem: Safety - Adult  Goal: Free from fall injury  6/24/2024 1627 by Edilma Quevedo RN  Outcome: Progressing  6/24/2024 0518 by Eri Flannery RN  Outcome: Progressing     Problem: ABCDS Injury Assessment  Goal: Absence of physical injury  6/24/2024 1627 by Edilma Quevedo RN  Outcome: Progressing  6/24/2024 0518 by Eri Flannery RN  Outcome: Progressing     Problem: Safety - Medical Restraint  Goal: Remains free of injury from restraints (Restraint for Interference with Medical Device)  Description: INTERVENTIONS:  1. Determine that other, less restrictive measures have been tried or would not be effective before applying the restraint  2. Evaluate the patient's condition at the time of restraint application  3. Inform patient/family regarding the reason for restraint  4. Q2H: Monitor safety, psychosocial status, comfort, nutrition and hydration  6/24/2024 1627 by Edilma Quevedo RN  Outcome: Progressing  6/24/2024 0518 by Eri Flannery RN  Outcome: Progressing  Flowsheets (Taken 6/23/2024 2000)  Remains free of injury from restraints (restraint for interference with medical device):   Determine that other, less restrictive measures have been tried or would not be effective before applying the restraint   Inform patient/family regarding the reason for restraint   Evaluate the patient's condition at the time of restraint application   Every 2 hours: Monitor safety, psychosocial status, comfort, nutrition and hydration     
  Problem: Discharge Planning  Goal: Discharge to home or other facility with appropriate resources  6/25/2024 0312 by Tomy Daugherty  Outcome: Progressing     Problem: Safety - Adult  Goal: Free from fall injury  6/25/2024 0312 by Tomy Daugherty  Outcome: Progressing     Problem: Safety - Medical Restraint  Goal: Remains free of injury from restraints (Restraint for Interference with Medical Device)  Description: INTERVENTIONS:  1. Determine that other, less restrictive measures have been tried or would not be effective before applying the restraint  2. Evaluate the patient's condition at the time of restraint application  3. Inform patient/family regarding the reason for restraint  4. Q2H: Monitor safety, psychosocial status, comfort, nutrition and hydration  6/25/2024 0312 by Tomy Daugherty  Outcome: Progressing  Flowsheets (Taken 6/25/2024 0312)  Remains free of injury from restraints (restraint for interference with medical device):   Determine that other, less restrictive measures have been tried or would not be effective before applying the restraint   Evaluate the patient's condition at the time of restraint application   Every 2 hours: Monitor safety, psychosocial status, comfort, nutrition and hydration     Problem: ABCDS Injury Assessment  Goal: Absence of physical injury  6/25/2024 0312 by Tomy Daugherty  Outcome: Progressing  Flowsheets (Taken 6/25/2024 0312)  Absence of Physical Injury: Implement safety measures based on patient assessment     Problem: Skin/Tissue Integrity  Goal: Absence of new skin breakdown  Description: 1.  Monitor for areas of redness and/or skin breakdown  2.  Assess vascular access sites hourly  3.  Every 4-6 hours minimum:  Change oxygen saturation probe site  4.  Every 4-6 hours:  If on nasal continuous positive airway pressure, respiratory therapy assess nares and determine need for appliance change or resting period.  Outcome: Progressing  Note: Turning and repositioning 
  Problem: Discharge Planning  Goal: Discharge to home or other facility with appropriate resources  6/25/2024 1649 by Ash Myles RN  Outcome: Progressing  6/25/2024 0312 by Tomy Daugherty  Outcome: Progressing     Problem: Safety - Adult  Goal: Free from fall injury  6/25/2024 1649 by Ash Myles RN  Outcome: Progressing  6/25/2024 0312 by Tomy Daugherty  Outcome: Progressing     Problem: Safety - Medical Restraint  Goal: Remains free of injury from restraints (Restraint for Interference with Medical Device)  Description: INTERVENTIONS:  1. Determine that other, less restrictive measures have been tried or would not be effective before applying the restraint  2. Evaluate the patient's condition at the time of restraint application  3. Inform patient/family regarding the reason for restraint  4. Q2H: Monitor safety, psychosocial status, comfort, nutrition and hydration  6/25/2024 1649 by Ash Myles RN  Outcome: Progressing  6/25/2024 0312 by Tomy Daugherty  Outcome: Progressing  Flowsheets (Taken 6/25/2024 0312)  Remains free of injury from restraints (restraint for interference with medical device):   Determine that other, less restrictive measures have been tried or would not be effective before applying the restraint   Evaluate the patient's condition at the time of restraint application   Every 2 hours: Monitor safety, psychosocial status, comfort, nutrition and hydration     Problem: ABCDS Injury Assessment  Goal: Absence of physical injury  6/25/2024 1649 by Ash Myles RN  Outcome: Progressing  Flowsheets (Taken 6/25/2024 1037)  Absence of Physical Injury: Implement safety measures based on patient assessment  6/25/2024 0312 by Tomy Daugherty  Outcome: Progressing  Flowsheets (Taken 6/25/2024 0312)  Absence of Physical Injury: Implement safety measures based on patient assessment     Problem: Skin/Tissue Integrity  Goal: Absence of new skin breakdown  Description: 1.  Monitor for areas of 
  Problem: Discharge Planning  Goal: Discharge to home or other facility with appropriate resources  6/29/2024 1606 by Edilma Quevedo RN  Outcome: Progressing  6/29/2024 0311 by Eri Flannery RN  Outcome: Progressing     Problem: Safety - Adult  Goal: Free from fall injury  6/29/2024 1606 by Edilma Quevedo RN  Outcome: Progressing  6/29/2024 0311 by Eri Flannery RN  Outcome: Progressing     Problem: Safety - Medical Restraint  Goal: Remains free of injury from restraints (Restraint for Interference with Medical Device)  Description: INTERVENTIONS:  1. Determine that other, less restrictive measures have been tried or would not be effective before applying the restraint  2. Evaluate the patient's condition at the time of restraint application  3. Inform patient/family regarding the reason for restraint  4. Q2H: Monitor safety, psychosocial status, comfort, nutrition and hydration  6/29/2024 1606 by Edilma Quevedo RN  Outcome: Progressing  6/29/2024 0311 by Eri Flannery RN  Outcome: Progressing  Flowsheets (Taken 6/28/2024 2000)  Remains free of injury from restraints (restraint for interference with medical device):   Determine that other, less restrictive measures have been tried or would not be effective before applying the restraint   Evaluate the patient's condition at the time of restraint application   Inform patient/family regarding the reason for restraint   Every 2 hours: Monitor safety, psychosocial status, comfort, nutrition and hydration     Problem: ABCDS Injury Assessment  Goal: Absence of physical injury  6/29/2024 1606 by Edilma Quevedo RN  Outcome: Progressing  6/29/2024 0311 by Eri Flannery RN  Outcome: Progressing     Problem: Skin/Tissue Integrity  Goal: Absence of new skin breakdown  Description: 1.  Monitor for areas of redness and/or skin breakdown  2.  Assess vascular access sites hourly  3.  Every 4-6 hours minimum:  Change oxygen saturation probe site  4.  Every 4-6 hours:  If on 
  Problem: Discharge Planning  Goal: Discharge to home or other facility with appropriate resources  6/30/2024 0429 by Eri Flannery RN  Outcome: Progressing  6/29/2024 1606 by Edilma Quevedo RN  Outcome: Progressing     Problem: Safety - Adult  Goal: Free from fall injury  6/30/2024 0429 by Eri Flannery RN  Outcome: Progressing  6/29/2024 1606 by Edilma Quevedo RN  Outcome: Progressing     Problem: Safety - Medical Restraint  Goal: Remains free of injury from restraints (Restraint for Interference with Medical Device)  Description: INTERVENTIONS:  1. Determine that other, less restrictive measures have been tried or would not be effective before applying the restraint  2. Evaluate the patient's condition at the time of restraint application  3. Inform patient/family regarding the reason for restraint  4. Q2H: Monitor safety, psychosocial status, comfort, nutrition and hydration  6/30/2024 0429 by Eri Flannery RN  Outcome: Progressing  Flowsheets (Taken 6/29/2024 2000)  Remains free of injury from restraints (restraint for interference with medical device):   Determine that other, less restrictive measures have been tried or would not be effective before applying the restraint   Evaluate the patient's condition at the time of restraint application   Inform patient/family regarding the reason for restraint   Every 2 hours: Monitor safety, psychosocial status, comfort, nutrition and hydration  6/29/2024 1606 by Edilma Quevedo RN  Outcome: Progressing     Problem: ABCDS Injury Assessment  Goal: Absence of physical injury  6/30/2024 0429 by Eri Flannery RN  Outcome: Progressing  6/29/2024 1606 by Edilma Quevedo RN  Outcome: Progressing     Problem: Skin/Tissue Integrity  Goal: Absence of new skin breakdown  Description: 1.  Monitor for areas of redness and/or skin breakdown  2.  Assess vascular access sites hourly  3.  Every 4-6 hours minimum:  Change oxygen saturation probe site  4.  Every 4-6 hours:  If on 
  Problem: Discharge Planning  Goal: Discharge to home or other facility with appropriate resources  Outcome: Progressing     Problem: Safety - Adult  Goal: Free from fall injury  Outcome: Progressing     Problem: Safety - Medical Restraint  Goal: Remains free of injury from restraints (Restraint for Interference with Medical Device)  Description: INTERVENTIONS:  1. Determine that other, less restrictive measures have been tried or would not be effective before applying the restraint  2. Evaluate the patient's condition at the time of restraint application  3. Inform patient/family regarding the reason for restraint  4. Q2H: Monitor safety, psychosocial status, comfort, nutrition and hydration  Outcome: Progressing     
  Problem: Discharge Planning  Goal: Discharge to home or other facility with appropriate resources  Outcome: Progressing     Problem: Safety - Adult  Goal: Free from fall injury  Outcome: Progressing     Problem: Safety - Medical Restraint  Goal: Remains free of injury from restraints (Restraint for Interference with Medical Device)  Description: INTERVENTIONS:  1. Determine that other, less restrictive measures have been tried or would not be effective before applying the restraint  2. Evaluate the patient's condition at the time of restraint application  3. Inform patient/family regarding the reason for restraint  4. Q2H: Monitor safety, psychosocial status, comfort, nutrition and hydration  Outcome: Progressing     Problem: ABCDS Injury Assessment  Goal: Absence of physical injury  Outcome: Progressing     
  Problem: Safety - Adult  Goal: Free from fall injury  6/23/2024 0444 by Eri Flannery, RN  Outcome: Progressing  6/22/2024 1622 by Edilma Quevedo RN  Outcome: Progressing     Problem: Safety - Medical Restraint  Goal: Remains free of injury from restraints (Restraint for Interference with Medical Device)  Description: INTERVENTIONS:  1. Determine that other, less restrictive measures have been tried or would not be effective before applying the restraint  2. Evaluate the patient's condition at the time of restraint application  3. Inform patient/family regarding the reason for restraint  4. Q2H: Monitor safety, psychosocial status, comfort, nutrition and hydration  6/23/2024 0444 by Eri Flannery, RN  Outcome: Progressing  Flowsheets  Taken 6/23/2024 0400  Remains free of injury from restraints (restraint for interference with medical device):   Determine that other, less restrictive measures have been tried or would not be effective before applying the restraint   Evaluate the patient's condition at the time of restraint application   Inform patient/family regarding the reason for restraint   Every 2 hours: Monitor safety, psychosocial status, comfort, nutrition and hydration  Taken 6/23/2024 0000  Remains free of injury from restraints (restraint for interference with medical device):   Determine that other, less restrictive measures have been tried or would not be effective before applying the restraint   Evaluate the patient's condition at the time of restraint application   Inform patient/family regarding the reason for restraint   Every 2 hours: Monitor safety, psychosocial status, comfort, nutrition and hydration  Taken 6/22/2024 2000  Remains free of injury from restraints (restraint for interference with medical device):   Determine that other, less restrictive measures have been tried or would not be effective before applying the restraint   Evaluate the patient's condition at the time of restraint 
  Problem: Safety - Adult  Goal: Free from fall injury  7/1/2024 0429 by Eri Flannery RN  Outcome: Progressing  7/1/2024 0429 by Eri Flannery RN  Outcome: Progressing  6/30/2024 1758 by Asim Justice RN  Outcome: Progressing  Flowsheets (Taken 6/30/2024 0800)  Free From Fall Injury: Instruct family/caregiver on patient safety     Problem: Safety - Medical Restraint  Goal: Remains free of injury from restraints (Restraint for Interference with Medical Device)  Description: INTERVENTIONS:  1. Determine that other, less restrictive measures have been tried or would not be effective before applying the restraint  2. Evaluate the patient's condition at the time of restraint application  3. Inform patient/family regarding the reason for restraint  4. Q2H: Monitor safety, psychosocial status, comfort, nutrition and hydration  7/1/2024 0429 by Eri Flannery RN  Outcome: Progressing  Flowsheets (Taken 6/30/2024 2000)  Remains free of injury from restraints (restraint for interference with medical device):   Determine that other, less restrictive measures have been tried or would not be effective before applying the restraint   Evaluate the patient's condition at the time of restraint application   Inform patient/family regarding the reason for restraint   Every 2 hours: Monitor safety, psychosocial status, comfort, nutrition and hydration  7/1/2024 0429 by Eri Flannery RN  Outcome: Progressing  Flowsheets (Taken 6/30/2024 2000)  Remains free of injury from restraints (restraint for interference with medical device):   Determine that other, less restrictive measures have been tried or would not be effective before applying the restraint   Evaluate the patient's condition at the time of restraint application   Inform patient/family regarding the reason for restraint   Every 2 hours: Monitor safety, psychosocial status, comfort, nutrition and hydration  6/30/2024 1758 by Asim Justice 
  Problem: Safety - Medical Restraint  Goal: Remains free of injury from restraints (Restraint for Interference with Medical Device)  Description: INTERVENTIONS:  1. Determine that other, less restrictive measures have been tried or would not be effective before applying the restraint  2. Evaluate the patient's condition at the time of restraint application  3. Inform patient/family regarding the reason for restraint  4. Q2H: Monitor safety, psychosocial status, comfort, nutrition and hydration  6/28/2024 1544 by Ellen Morillo RN  Outcome: Progressing  Flowsheets  Taken 6/28/2024 0600 by Shandra Worthy RN  Remains free of injury from restraints (restraint for interference with medical device):   Determine that other, less restrictive measures have been tried or would not be effective before applying the restraint   Evaluate the patient's condition at the time of restraint application   Inform patient/family regarding the reason for restraint   Every 2 hours: Monitor safety, psychosocial status, comfort, nutrition and hydration  Taken 6/28/2024 0400 by Shandra Worthy RN  Remains free of injury from restraints (restraint for interference with medical device):   Determine that other, less restrictive measures have been tried or would not be effective before applying the restraint   Evaluate the patient's condition at the time of restraint application   Inform patient/family regarding the reason for restraint   Every 2 hours: Monitor safety, psychosocial status, comfort, nutrition and hydration  6/28/2024 0220 by Shandra Worthy RN  Outcome: Progressing  Flowsheets  Taken 6/28/2024 0200  Remains free of injury from restraints (restraint for interference with medical device):   Determine that other, less restrictive measures have been tried or would not be effective before applying the restraint   Evaluate the patient's condition at the time of restraint application   Inform patient/family regarding the reason for 
  Problem: Skin/Tissue Integrity  Goal: Absence of new skin breakdown  Description: 1.  Monitor for areas of redness and/or skin breakdown  2.  Assess vascular access sites hourly  3.  Every 4-6 hours minimum:  Change oxygen saturation probe site  4.  Every 4-6 hours:  If on nasal continuous positive airway pressure, respiratory therapy assess nares and determine need for appliance change or resting period.  7/1/2024 1525 by Ash Myles RN  Outcome: Completed  7/1/2024 1522 by Ash Myles RN  Outcome: Adequate for Discharge  7/1/2024 0429 by Eri Flannery RN  Outcome: Progressing     Problem: Respiratory - Adult  Goal: Achieves optimal ventilation and oxygenation  7/1/2024 1525 by Ash Myles RN  Outcome: Completed  7/1/2024 1522 by Ash Myles RN  Outcome: Adequate for Discharge  7/1/2024 0429 by Eri Flannery RN  Outcome: Progressing     Problem: Metabolic/Fluid and Electrolytes - Adult  Goal: Electrolytes maintained within normal limits  7/1/2024 1525 by Ash Myles RN  Outcome: Completed  7/1/2024 1522 by Ash Myles RN  Outcome: Adequate for Discharge  7/1/2024 0429 by Eri Flannery RN  Outcome: Progressing  Goal: Hemodynamic stability and optimal renal function maintained  7/1/2024 1525 by Ash Myles RN  Outcome: Completed  7/1/2024 1522 by Ash Myles RN  Outcome: Adequate for Discharge  7/1/2024 0429 by Eri Flannery RN  Outcome: Progressing  Goal: Glucose maintained within prescribed range  7/1/2024 1525 by Ash Myles RN  Outcome: Completed  7/1/2024 1522 by Ash Myles RN  Outcome: Adequate for Discharge  7/1/2024 0429 by Eri Flannery RN  Outcome: Progressing     Problem: Nutrition Deficit:  Goal: Optimize nutritional status  7/1/2024 1525 by Ash Myles RN  Outcome: Completed  7/1/2024 1522 by Ash Myles RN  Outcome: Adequate for Discharge  7/1/2024 0429 by Eri Flannery RN  Outcome: Progressing     Problem: Pain  Goal: Verbalizes/displays 
ABG results noted     Problem: Metabolic/Fluid and Electrolytes - Adult  Goal: Electrolytes maintained within normal limits  Outcome: Progressing     Problem: Metabolic/Fluid and Electrolytes - Adult  Goal: Hemodynamic stability and optimal renal function maintained  Outcome: Progressing     Problem: Metabolic/Fluid and Electrolytes - Adult  Goal: Glucose maintained within prescribed range  Outcome: Progressing     Problem: Skin/Tissue Integrity - Adult  Goal: Incisions, wounds, or drain sites healing without S/S of infection  Outcome: Progressing     Problem: Musculoskeletal - Adult  Goal: Return mobility to safest level of function  Outcome: Progressing     Problem: Infection - Adult  Goal: Absence of infection at discharge  Outcome: Progressing     Problem: Nutrition Deficit:  Goal: Optimize nutritional status  Outcome: Progressing     Problem: Pain  Goal: Verbalizes/displays adequate comfort level or baseline comfort level  Outcome: Adequate for Discharge     Problem: Chronic Conditions and Co-morbidities  Goal: Patient's chronic conditions and co-morbidity symptoms are monitored and maintained or improved  Outcome: Progressing     
interference with medical device):   Determine that other, less restrictive measures have been tried or would not be effective before applying the restraint   Evaluate the patient's condition at the time of restraint application   Inform patient/family regarding the reason for restraint   Every 2 hours: Monitor safety, psychosocial status, comfort, nutrition and hydration  Taken 6/28/2024 0400 by Shandra Worthy RN  Remains free of injury from restraints (restraint for interference with medical device):   Determine that other, less restrictive measures have been tried or would not be effective before applying the restraint   Evaluate the patient's condition at the time of restraint application   Inform patient/family regarding the reason for restraint   Every 2 hours: Monitor safety, psychosocial status, comfort, nutrition and hydration     Problem: ABCDS Injury Assessment  Goal: Absence of physical injury  6/29/2024 0311 by Eri Flannery RN  Outcome: Progressing  6/28/2024 1744 by Zoe Cadena RN  Outcome: Progressing  Flowsheets (Taken 6/27/2024 2138 by Shandra Worthy RN)  Absence of Physical Injury: Implement safety measures based on patient assessment     Problem: Skin/Tissue Integrity  Goal: Absence of new skin breakdown  Description: 1.  Monitor for areas of redness and/or skin breakdown  2.  Assess vascular access sites hourly  3.  Every 4-6 hours minimum:  Change oxygen saturation probe site  4.  Every 4-6 hours:  If on nasal continuous positive airway pressure, respiratory therapy assess nares and determine need for appliance change or resting period.  6/29/2024 0311 by Eri Flannery RN  Outcome: Progressing  6/28/2024 1744 by Zoe Cadena RN  Outcome: Progressing     Problem: Respiratory - Adult  Goal: Achieves optimal ventilation and oxygenation  6/29/2024 0311 by Eri Flannery RN  Outcome: Progressing  6/28/2024 1744 by Zoe Cadena RN  Outcome: Progressing  Flowsheets (Taken 6/28/2024 0400 
restraints (restraint for interference with medical device):   Determine that other, less restrictive measures have been tried or would not be effective before applying the restraint   Evaluate the patient's condition at the time of restraint application   Inform patient/family regarding the reason for restraint   Every 2 hours: Monitor safety, psychosocial status, comfort, nutrition and hydration  Taken 6/26/2024 2325  Remains free of injury from restraints (restraint for interference with medical device):   Determine that other, less restrictive measures have been tried or would not be effective before applying the restraint   Evaluate the patient's condition at the time of restraint application   Inform patient/family regarding the reason for restraint   Every 2 hours: Monitor safety, psychosocial status, comfort, nutrition and hydration  Taken 6/26/2024 2200  Remains free of injury from restraints (restraint for interference with medical device):   Determine that other, less restrictive measures have been tried or would not be effective before applying the restraint   Evaluate the patient's condition at the time of restraint application   Inform patient/family regarding the reason for restraint   Every 2 hours: Monitor safety, psychosocial status, comfort, nutrition and hydration  Taken 6/26/2024 2000  Remains free of injury from restraints (restraint for interference with medical device):   Determine that other, less restrictive measures have been tried or would not be effective before applying the restraint   Evaluate the patient's condition at the time of restraint application   Inform patient/family regarding the reason for restraint     Problem: ABCDS Injury Assessment  Goal: Absence of physical injury  6/27/2024 0354 by Shandra Worthy, RN  Outcome: Progressing  Flowsheets (Taken 6/26/2024 8260)  Absence of Physical Injury: Implement safety measures based on patient assessment     Problem: Skin/Tissue 
safety, psychosocial status, comfort, nutrition and hydration  Taken 6/30/2024 1600  Remains free of injury from restraints (restraint for interference with medical device):   Determine that other, less restrictive measures have been tried or would not be effective before applying the restraint   Evaluate the patient's condition at the time of restraint application  Taken 6/30/2024 1200  Remains free of injury from restraints (restraint for interference with medical device): Determine that other, less restrictive measures have been tried or would not be effective before applying the restraint  Taken 6/30/2024 0800  Remains free of injury from restraints (restraint for interference with medical device):   Determine that other, less restrictive measures have been tried or would not be effective before applying the restraint   Evaluate the patient's condition at the time of restraint application  6/30/2024 0429 by Eri Flannery RN  Outcome: Progressing  Flowsheets (Taken 6/29/2024 2000)  Remains free of injury from restraints (restraint for interference with medical device):   Determine that other, less restrictive measures have been tried or would not be effective before applying the restraint   Evaluate the patient's condition at the time of restraint application   Inform patient/family regarding the reason for restraint   Every 2 hours: Monitor safety, psychosocial status, comfort, nutrition and hydration     Problem: ABCDS Injury Assessment  Goal: Absence of physical injury  6/30/2024 1758 by Asim Justice RN  Outcome: Progressing  Flowsheets (Taken 6/30/2024 0800)  Absence of Physical Injury: Implement safety measures based on patient assessment  6/30/2024 0429 by Eri Flannery RN  Outcome: Progressing     Problem: Skin/Tissue Integrity  Goal: Absence of new skin breakdown  Description: 1.  Monitor for areas of redness and/or skin breakdown  2.  Assess vascular access sites hourly  3.  Every 4-6 
chronic conditions and co-morbidity symptoms are monitored and maintained or improved  6/26/2024 1719 by Ash Myles RN  Outcome: Progressing  6/26/2024 0710 by Basilia Gallego RN  Outcome: Progressing     Problem: Skin/Tissue Integrity - Adult  Goal: Incisions, wounds, or drain sites healing without S/S of infection  6/26/2024 1719 by Ash Myles RN  Outcome: Progressing  6/26/2024 0710 by Basilia Gallego RN  Outcome: Progressing     Problem: Musculoskeletal - Adult  Goal: Return mobility to safest level of function  6/26/2024 1719 by Ash Myles RN  Outcome: Progressing  6/26/2024 0710 by Basilia Gallego RN  Outcome: Progressing     Problem: Infection - Adult  Goal: Absence of infection at discharge  6/26/2024 1719 by Ash Myles RN  Outcome: Progressing  6/26/2024 0710 by Basilia Gallego RN  Outcome: Progressing     
infection  7/1/2024 1522 by Ash Myles RN  Outcome: Adequate for Discharge  7/1/2024 0429 by Eri Flannery RN  Outcome: Progressing     Problem: Musculoskeletal - Adult  Goal: Return mobility to safest level of function  7/1/2024 1522 by Ash Myles RN  Outcome: Adequate for Discharge  7/1/2024 0429 by Eri Flannery, RN  Outcome: Progressing     Problem: Infection - Adult  Goal: Absence of infection at discharge  7/1/2024 1522 by Ash Myles RN  Outcome: Adequate for Discharge  7/1/2024 0429 by Eri Flannery RN  Outcome: Progressing    Discharging to [facility]   
evaluate response     Problem: Chronic Conditions and Co-morbidities  Goal: Patient's chronic conditions and co-morbidity symptoms are monitored and maintained or improved  Outcome: Progressing  Flowsheets (Taken 6/28/2024 0400 by Shandra Worthy RN)  Care Plan - Patient's Chronic Conditions and Co-Morbidity Symptoms are Monitored and Maintained or Improved:   Monitor and assess patient's chronic conditions and comorbid symptoms for stability, deterioration, or improvement   Collaborate with multidisciplinary team to address chronic and comorbid conditions and prevent exacerbation or deterioration   Update acute care plan with appropriate goals if chronic or comorbid symptoms are exacerbated and prevent overall improvement and discharge     Problem: Skin/Tissue Integrity - Adult  Goal: Incisions, wounds, or drain sites healing without S/S of infection  Outcome: Progressing  Flowsheets (Taken 6/28/2024 0400 by Shandra Worthy RN)  Incisions, Wounds, or Drain Sites Healing Without Sign and Symptoms of Infection: ADMISSION and DAILY: Assess and document risk factors for pressure ulcer development     Problem: Musculoskeletal - Adult  Goal: Return mobility to safest level of function  Outcome: Progressing  Flowsheets (Taken 6/28/2024 0400 by Shandra Worthy, RN)  Return Mobility to Safest Level of Function:   Assess patient stability and activity tolerance for standing, transferring and ambulating with or without assistive devices   Assist with transfers and ambulation using safe patient handling equipment as needed     Problem: Infection - Adult  Goal: Absence of infection at discharge  Outcome: Progressing  Flowsheets (Taken 6/28/2024 0400 by Shandra Worthy, RN)  Absence of infection at discharge:   Assess and monitor for signs and symptoms of infection   Monitor lab/diagnostic results   Monitor all insertion sites i.e., indwelling lines, tubes and drains     
exacerbated and prevent overall improvement and discharge  Taken 6/27/2024 2000  Care Plan - Patient's Chronic Conditions and Co-Morbidity Symptoms are Monitored and Maintained or Improved:   Monitor and assess patient's chronic conditions and comorbid symptoms for stability, deterioration, or improvement   Collaborate with multidisciplinary team to address chronic and comorbid conditions and prevent exacerbation or deterioration   Update acute care plan with appropriate goals if chronic or comorbid symptoms are exacerbated and prevent overall improvement and discharge     Problem: Skin/Tissue Integrity - Adult  Goal: Incisions, wounds, or drain sites healing without S/S of infection  Outcome: Progressing  Flowsheets  Taken 6/28/2024 0000  Incisions, Wounds, or Drain Sites Healing Without Sign and Symptoms of Infection: ADMISSION and DAILY: Assess and document risk factors for pressure ulcer development  Taken 6/27/2024 2138  Incisions, Wounds, or Drain Sites Healing Without Sign and Symptoms of Infection: ADMISSION and DAILY: Assess and document risk factors for pressure ulcer development  Taken 6/27/2024 2000  Incisions, Wounds, or Drain Sites Healing Without Sign and Symptoms of Infection: ADMISSION and DAILY: Assess and document risk factors for pressure ulcer development     Problem: Musculoskeletal - Adult  Goal: Return mobility to safest level of function  Outcome: Progressing  Flowsheets  Taken 6/28/2024 0000  Return Mobility to Safest Level of Function:   Assess patient stability and activity tolerance for standing, transferring and ambulating with or without assistive devices   Assist with transfers and ambulation using safe patient handling equipment as needed   Ensure adequate protection for wounds/incisions during mobilization  Taken 6/27/2024 2000  Return Mobility to Safest Level of Function:   Assess patient stability and activity tolerance for standing, transferring and ambulating with or without

## 2024-07-01 NOTE — CARE COORDINATION
Writer met with bedside RN Arcelia. Writer informed pt has been terminally extubated.  Electronically signed by JUAN C Lott on 7/1/2024 at 11:57 AM

## 2024-07-01 NOTE — PROGRESS NOTES
ICU Progress Note (Vent)   [facility]    Patient - Ramon Roger,  Age - 63 y.o.    - 1960      Room Number -    MRN -  [id number]   [facility] # - [id number]  Date of Admission -  2024 11:02 AM    Events of Past 24 Hours     Failed weaning trial again today despite being on Precedex; respiratory rate went from the low 30s to the 40s; placed back on full support  However, opens eyes to voice but does not follow any commands for me.  MRI result shows new infarct in occipital area on the right, unclear clinically if this is causing his mental status to be so poor    Vitals    height is 1.803 m (5' 11\") and weight is 102 kg (224 lb 13.9 oz). His oral temperature is 99.7 °F (37.6 °C). His blood pressure is 161/54 (abnormal) and his pulse is 69. His respiration is 30 and oxygen saturation is 100%.       Temperature Range: Temp: 99.7 °F (37.6 °C) Temp  Av.5 °F (37.5 °C)  Min: 98.3 °F (36.8 °C)  Max: 100.4 °F (38 °C)  BP Range:  Systolic (24hrs), Av , Min:135 , Max:181     Diastolic (24hrs), Av, Min:43, Max:66    Pulse Range: Pulse  Av.1  Min: 56  Max: 85  Respiration Range: Resp  Av.2  Min: 18  Max: 44  Current Pulse Ox::  SpO2: 100 %  24HR Pulse Ox Range:  SpO2  Av.9 %  Min: 99 %  Max: 100 %  Oxygen Amount and Delivery: O2 Flow Rate (L/min): 2 L/min      Wt Readings from Last 3 Encounters:   24 102 kg (224 lb 13.9 oz)   20 99.8 kg (220 lb)   20 102 kg (224 lb 13.9 oz)     I/O     Intake/Output Summary (Last 24 hours) at 2024 0734  Last data filed at 2024 0545  Gross per 24 hour   Intake 2246.39 ml   Output 2385 ml   Net -138.61 ml       I/O last 3 completed shifts:  In: 4040.3 [I.V.:300.1; NG/GT:2672; IV Piggyback:668.1]  Out: 5435 [Urine:2325; Stool:710]     DRAIN/TUBE OUTPUT:     Invasive Lines   ETT Day -   intubated   Lines -dialysis catheter placed  and then replaced by IR 
                   ICU Progress Note (Vent)   [facility]    Patient - Ramon Roger,  Age - 63 y.o.    - 1960      Room Number -    MRN -  [id number]   Merged with [facility] # - [id number]  Date of Admission -  2024 11:02 AM    Events of Past 24 Hours     Respiratory rate around 32 this a.m. remains on Versed 5 mg/hr. no sedation vacation done just yet.  Podiatry notes reviewed.    Vitals    height is 1.803 m (5' 11\") and weight is 104 kg (229 lb 4.5 oz). His axillary temperature is 100.1 °F (37.8 °C). His blood pressure is 173/71 (abnormal) and his pulse is 85. His respiration is 26 and oxygen saturation is 100%.       Temperature Range: Temp: 100.1 °F (37.8 °C) Temp  Av.3 °F (37.4 °C)  Min: 98.7 °F (37.1 °C)  Max: 100.1 °F (37.8 °C)  BP Range:  Systolic (24hrs), Av , Min:130 , Max:199     Diastolic (24hrs), Av, Min:58, Max:87    Pulse Range: Pulse  Av  Min: 70  Max: 88  Respiration Range: Resp  Av.6  Min: 19  Max: 33  Current Pulse Ox::  SpO2: 100 %  24HR Pulse Ox Range:  SpO2  Av %  Min: 100 %  Max: 100 %  Oxygen Amount and Delivery: O2 Flow Rate (L/min): 2 L/min      Wt Readings from Last 3 Encounters:   24 104 kg (229 lb 4.5 oz)   20 99.8 kg (220 lb)   20 102 kg (224 lb 13.9 oz)     I/O     Intake/Output Summary (Last 24 hours) at 2024 0947  Last data filed at 2024 0549  Gross per 24 hour   Intake 1347.35 ml   Output 1190 ml   Net 157.35 ml       I/O last 3 completed shifts:  In: 4424.4 [I.V.:3683.4; NG/GT:408; IV Piggyback:333]  Out: 1540 [Urine:1500; Drains:40]     DRAIN/TUBE OUTPUT:     Invasive Lines   ETT Day -   intubated   Lines -dialysis catheter placed     ICP PRESSURE RANGE:  No data recorded  CVP PRESSURE RANGE:  No data recorded  Mechanical Ventilation Data   SETTINGS (Comprehensive)  Vent Information  Equipment Changed: HME  Vent Mode: AC/PRVC  Additional Respiratory Assessments  Pulse: 
                   ICU Progress Note (Vent)   [facility]    Patient - Ramon Roger,  Age - 63 y.o.    - 1960      Room Number -    N -  [id number]   [facility] # - [id number]  Date of Admission -  2024 11:02 AM    Events of Past 24 Hours     Extensive discussion with patient's family yesterday see my note.  For now they want to continue current level of care.  He failed his weaning trial due to tachypnea this morning.  Alex catheter placed by IR.    On Versed drip at 4 mg/h    Vitals    height is 1.803 m (5' 11\") and weight is 103.8 kg (228 lb 13.4 oz). His oral temperature is 97.7 °F (36.5 °C). His blood pressure is 143/51 (abnormal) and his pulse is 58. His respiration is 25 and oxygen saturation is 100%.       Temperature Range: Temp: 97.7 °F (36.5 °C) Temp  Av.9 °F (37.2 °C)  Min: 97.7 °F (36.5 °C)  Max: 99.8 °F (37.7 °C)  BP Range:  Systolic (24hrs), Av , Min:120 , Max:203     Diastolic (24hrs), Av, Min:47, Max:88    Pulse Range: Pulse  Av.6  Min: 53  Max: 84  Respiration Range: Resp  Av.7  Min: 10  Max: 54  Current Pulse Ox::  SpO2: 100 %  24HR Pulse Ox Range:  SpO2  Av.8 %  Min: 98 %  Max: 100 %  Oxygen Amount and Delivery: O2 Flow Rate (L/min): 2 L/min      Wt Readings from Last 3 Encounters:   24 103.8 kg (228 lb 13.4 oz)   20 99.8 kg (220 lb)   20 102 kg (224 lb 13.9 oz)     I/O     Intake/Output Summary (Last 24 hours) at 2024 0907  Last data filed at 2024 0800  Gross per 24 hour   Intake 2267.82 ml   Output 1705 ml   Net 562.82 ml       I/O last 3 completed shifts:  In: 3451.7 [I.V.:285.9; NG/GT:2944; IV Piggyback:221.8]  Out:  [Urine:1850; Stool:75]     DRAIN/TUBE OUTPUT:     Invasive Lines   ETT Day -   intubated   Lines -dialysis catheter placed  and then replaced by IR     ICP PRESSURE RANGE:  No data recorded  CVP PRESSURE RANGE:  No data recorded  Mechanical 
                   ICU Progress Note (Vent)   [facility]    Patient - Ramon Roger,  Age - 63 y.o.    - 1960      Room Number -    N -  [id number]   [facility] # - [id number]  Date of Admission -  2024 11:02 AM    Events of Past 24 Hours   Remains sedated on propofol, but respiratory rate around 30.  When sedation was decreased, respiratory rate in the 40s.  Patient was minimally responsive yesterday when sedation vacation done.      Vitals    height is 1.778 m (5' 10\") and weight is 101 kg (222 lb 10.6 oz). His axillary temperature is 99 °F (37.2 °C). His blood pressure is 155/71 (abnormal) and his pulse is 84. His respiration is 33 (abnormal) and oxygen saturation is 100%.       Temperature Range: Temp: 99 °F (37.2 °C) Temp  Av.7 °F (37.6 °C)  Min: 99 °F (37.2 °C)  Max: 100.4 °F (38 °C)  BP Range:  Systolic (24hrs), Av , Min:109 , Max:155     Diastolic (24hrs), Av, Min:54, Max:94    Pulse Range: Pulse  Av.3  Min: 70  Max: 94  Respiration Range: Resp  Av.1  Min: 26  Max: 35  Current Pulse Ox::  SpO2: 100 %  24HR Pulse Ox Range:  SpO2  Av %  Min: 100 %  Max: 100 %  Oxygen Amount and Delivery: O2 Flow Rate (L/min): 2 L/min      Wt Readings from Last 3 Encounters:   24 101 kg (222 lb 10.6 oz)   20 99.8 kg (220 lb)   20 102 kg (224 lb 13.9 oz)     I/O     Intake/Output Summary (Last 24 hours) at 2024 0943  Last data filed at 2024 0800  Gross per 24 hour   Intake 2254.53 ml   Output 765 ml   Net 1489.53 ml     I/O last 3 completed shifts:  In: 2684.2 [I.V.:2334.5; IV Piggyback:349.7]  Out: 795 [Urine:750; Drains:45]     DRAIN/TUBE OUTPUT:     Invasive Lines   ETT Day -   intubated   Lines -dialysis catheter placed     ICP PRESSURE RANGE:  No data recorded  CVP PRESSURE RANGE:  No data recorded  Mechanical Ventilation Data   SETTINGS (Comprehensive)  Vent Information  Equipment Changed: HME  Vent 
                   ICU Progress Note (Vent)   [facility]    Patient - Ramon Roger,  Age - 63 y.o.    - 1960      Room Number -    N -  [id number]   [facility] # - [id number]  Date of Admission -  2024 11:02 AM    Events of Past 24 Hours     Continues to fail weaning trials primarily due to tachypnea.  Remains unresponsive even when off sedation      Vitals    height is 1.803 m (5' 11\") and weight is 102 kg (224 lb 13.9 oz). His axillary temperature is 100.1 °F (37.8 °C). His blood pressure is 162/54 (abnormal) and his pulse is 77. His respiration is 28 and oxygen saturation is 100%.       Temperature Range: Temp: 100.1 °F (37.8 °C) Temp  Av.8 °F (37.1 °C)  Min: 97.9 °F (36.6 °C)  Max: 100.1 °F (37.8 °C)  BP Range:  Systolic (24hrs), Av , Min:127 , Max:172     Diastolic (24hrs), Av, Min:46, Max:74    Pulse Range: Pulse  Av  Min: 56  Max: 77  Respiration Range: Resp  Av.3  Min: 17  Max: 33  Current Pulse Ox::  SpO2: 100 %  24HR Pulse Ox Range:  SpO2  Av.9 %  Min: 98 %  Max: 100 %  Oxygen Amount and Delivery: O2 Flow Rate (L/min): 2 L/min      Wt Readings from Last 3 Encounters:   24 102 kg (224 lb 13.9 oz)   20 99.8 kg (220 lb)   20 102 kg (224 lb 13.9 oz)     I/O     Intake/Output Summary (Last 24 hours) at 2024 0846  Last data filed at 2024 0521  Gross per 24 hour   Intake 3246.5 ml   Output 4225 ml   Net -978.5 ml       I/O last 3 completed shifts:  In: 4416.5 [I.V.:320.1; NG/GT:2699; IV Piggyback:997.5]  Out: 5080 [Urine:2105; Stool:575]     DRAIN/TUBE OUTPUT:     Invasive Lines   ETT Day -   intubated   Lines -dialysis catheter placed  and then replaced by IR     ICP PRESSURE RANGE:  No data recorded  CVP PRESSURE RANGE:  No data recorded  Mechanical Ventilation Data   SETTINGS (Comprehensive)  Vent Information  Equipment Changed: HME  Vent Mode: AC/PRVC  Additional Respiratory 
                   ICU Progress Note (Vent)   [facility]    Patient - Ramon Roger,  Age - 63 y.o.    - 1960      Room Number -    N -  [id number]   [facility] # - [id number]  Date of Admission -  2024 11:02 AM    Events of Past 24 Hours     Patient continues to have persistent bacteremia, respiratory rate is around 30 with ventilator set at 28.  Currently not on any pressors hypertensive.  Plan sedation vacation done, he does not really follow commands.    I was told Alex catheter is not functioning well    Vitals    height is 1.803 m (5' 11\") and weight is 104 kg (229 lb 4.5 oz). His axillary temperature is 99.3 °F (37.4 °C). His blood pressure is 177/65 (abnormal) and his pulse is 74. His respiration is 28 and oxygen saturation is 100%.       Temperature Range: Temp: 99.3 °F (37.4 °C) Temp  Av.3 °F (37.4 °C)  Min: 99 °F (37.2 °C)  Max: 100 °F (37.8 °C)  BP Range:  Systolic (24hrs), Av , Min:118 , Max:185     Diastolic (24hrs), Av, Min:50, Max:81    Pulse Range: Pulse  Av.3  Min: 61  Max: 89  Respiration Range: Resp  Av.3  Min: 17  Max: 33  Current Pulse Ox::  SpO2: 100 %  24HR Pulse Ox Range:  SpO2  Av %  Min: 100 %  Max: 100 %  Oxygen Amount and Delivery: O2 Flow Rate (L/min): 2 L/min      Wt Readings from Last 3 Encounters:   24 104 kg (229 lb 4.5 oz)   20 99.8 kg (220 lb)   20 102 kg (224 lb 13.9 oz)     I/O     Intake/Output Summary (Last 24 hours) at 2024 1147  Last data filed at 2024 0800  Gross per 24 hour   Intake 3090.36 ml   Output 2481 ml   Net 609.36 ml       I/O last 3 completed shifts:  In: 3314.7 [I.V.:459; NG/GT:1794; IV Piggyback:461.6]  Out: 3241 [Urine:2000; Drains:10]     DRAIN/TUBE OUTPUT:     Invasive Lines   ETT Day -   intubated   Lines -dialysis catheter placed     ICP PRESSURE RANGE:  No data recorded  CVP PRESSURE RANGE:  No data recorded  Mechanical Ventilation 
          [facility] -   Infectious diseases evaluation  admission date 6/21/2024    reason for consultation:   Elevated WBC, infected wound    Impression :   Current:  Left knee septic arthritis status post arthroscopic I&D group A streptococcus growth on culture  Group A streptococcus bacteremia  Left foot infected wound with underlying osteomyelitis/Enterobacter cloacae and group A streptococcus growth on foot culture  Sepsis   Metabolic acidosis  Acute respiratory failure required intubation June 21, 2024  Acute on chronic renal failure  Peripheral vascular disease  Diabetes mellitus  Rhabdomyolysis  Status post right below-knee amputation.  Thrombocytopenia      HENCE:     IV Zosyn  Discontinue Zyvox due to thrombocytopenia and adequate coverage by Zosyn.  Follow final cultures, adjust antibiotic as needed  Follow CBC and renal function  No plan for TMA per podiatry  Discussed with nursing staff, will discuss with podiatry and orthopedic surgery  Vascular surgery evaluation noted , no intervention planned  Supportive care    Infection Control Recommendations   [place] Precautions      Antimicrobial Stewardship Recommendations   Simplification of therapy  Targeted therapy      History of Present Illness:   Initial history:  Ramon Roger is a 63 y.o.-year-old male was brought to the hospital after he was found unresponsive on the floor by family soiled in stool and urine reportedly.  The patient was febrile at the ER with temperature max of 102.9, tachycardic .  The patient was initially confused, was placed on BiPAP initially, subsequently was intubated.  Patient is currently intubated, sedated, unable to provide history that was obtained from chart review and nursing staff.  Initial WBC 21.9, creatinine 2.8, CK3 375, lactic acid elevated  He was found to have left foot wound that was debrided at the bedside by podiatry.  The patient was found to have swelling to the 
          [facility] -   Infectious diseases evaluation  admission date 6/21/2024    reason for consultation:   Elevated WBC, infected wound    Impression :   Current:  Left knee septic arthritis status post arthroscopic I&D group A streptococcus growth on culture  Group A streptococcus bacteremia  Left foot infected wound with underlying osteomyelitis/Enterobacter cloacae and group A streptococcus growth on foot culture  Sepsis   Metabolic acidosis  Acute respiratory failure required intubation June 21, 2024  Acute on chronic renal failure  Peripheral vascular disease  Diabetes mellitus  Rhabdomyolysis  Status post right below-knee amputation.  Thrombocytopenia      HENCE:     IV Zosyn  Follow final cultures, adjust antibiotic as needed  Follow CBC and renal function  N.p.o. for potential TMA  Vascular surgery evaluation noted , no intervention planned  Supportive care    Infection Control Recommendations   [place] Precautions      Antimicrobial Stewardship Recommendations   Simplification of therapy  Targeted therapy      History of Present Illness:   Initial history:  Ramon Roger is a 63 y.o.-year-old male was brought to the hospital after he was found unresponsive on the floor by family soiled in stool and urine reportedly.  The patient was febrile at the ER with temperature max of 102.9, tachycardic .  The patient was initially confused, was placed on BiPAP initially, subsequently was intubated.  Patient is currently intubated, sedated, unable to provide history that was obtained from chart review and nursing staff.  Initial WBC 21.9, creatinine 2.8, CK3 375, lactic acid elevated  He was found to have left foot wound that was debrided at the bedside by podiatry.  The patient was found to have swelling to the left knee status post aspiration with a purulent fluid.  Status post arthroscopic debridement.  Left foot MRI suggestive of osteomyelitis  IMPRESSION:  1. Deep 
        [facility] Neurology   IN-PATIENT SERVICE      NEUROLOGY PROGRESS  NOTE                Interval History:     Patient is currently on midazolam drip.  He has had difficulty weaning off of sedation and not really following commands.  Family is at bedside who has considered possibly withdrawing care.  Neurology reconsulted today.    History of Present Illness:     Obtained from my partner:     63-year-old male with past medical history of diabetes, PAD, depression, peripheral polyneuropathy, status post right BKA, CKD, who was brought to the ED for altered mentation after he was found on the floor by family.  Neurology consulted for further evaluation.  History limited given patient's mentation.  Obtained primarily from primary team and chart review.     Patient was brought to the ED today after he was found by family on the floor, minimally responsive.  Appeared to be soiled in stool and urine.  He was last seen normal on Tuesday at his usual baseline although apparently wasn't feeling well.  In the ED, he was febrile, met sepsis criteria.  There was concern for hypoglycemia when he was found by EMS.  He was also noted to have AMANDA, UTI, rhabdomyolysis.  Per ED physician, he was initially confused but able to answer some questions.  He was then transferred to the ICU.  In the ICU, appears his mentation worsened. BG noted with be 21.  I was called to the bedside emergently.  Primary team had noticed that he was having generalized tremoring/shivering. Had fever 102.9.  He was given Ativan by primary team due to concern for possible seizure activity.  During my examination, he was obtunded, opened eyes briefly to noxious stimuli.  Intermittently, he was tracking, not following commands however.  Noted to have generalized shivering.  No clear seizure-like activity witnessed including gaze deviation or stereotypical motor movements.  He was subsequently intubated for airway protection     6/24: Started having increased 
     Nutrition Note    Plan for withdrawal of care noted. Tube feeding has been discontinued. No further nutrition intervention needed.     Electronically signed by CARLOS WHITLOCK on 7/1/24 at 12:13 PM EDT    Carlos Whitlock, Dietetic Intern  NUHA Lincoln RD, LD    
    Department of Internal Medicine  Nephrology Aleksander Huerta MD Progress Note    Reason for consultation: Management of acute kidney injury.    Consulting physician: Seferino Denny MD    Interval history:   Patient was seen and examined today in the intensive care unit where he remains intubated and on ventilator support.   He is hemodynamically stable and not requiring pressors.  Scr remains elevated 4.0 mg/dl  UOP 1.3 L yesterday in 24  hours.  Patient had dialysis today, poor blood flow via temp dialysis catheter, dialysis was terminated after 2 hour 20 mins.  BP stable      History of present Illness: This is a 63 y.o. male with a significant past medical history of benign prostatic hyperplasia, as patient has exposure, type 2 diabetes mellitus with diabetic peripheral neuropathy, peripheral artery disease [left foot ulcer and prior s/p right below-knee amputation], and systemic hypertension, who presented to the emergency department with complaints of altered mental status. Patient apparently was found laying on the floor at home confused and being on the floor for probably up to 48 hours although duration is uncertain.  Initial blood sugar obtained by EMS was 87 mg/dL.  Initial blood pressure was 157/104 mmHg with pulse 123 and temperature 98.7  Chest x-ray showed cardiomegaly without acute pulmonary process.  Laboratory studies were remarkable for lactic acid 3.4 mmol/L; WBC 21.9 K; and BUN/creatinine 78/2.8 mg/dL.  Total CK was elevated at 3375.  His baseline renal function is normal although patient had episode of vancomycin related acute kidney injury during my encounter with him at [facility] in 2021.  He was admitted to the ICU and placed on BiPAP but required endotracheal intubation at about 6 PM on 6/21/2024.  He remains hemodynamically stable but has been febrile overnight with Tmax 102.9 °F.  He is oligoanuric.  Laboratory studies this morning shows worsening renal function with 
    Department of Internal Medicine  Nephrology Karishma Pelayo MD Progress Note    Reason for consultation: Management of acute kidney injury.    Consulting physician: Seferino Denny MD    Interval history: Patient remains intubated and on ventilator support.  He is oliguric. Hemodynamic profile is stable and he does not require pressors.   He had recurrent episodes of hypoglycemia last night and was placed on 10% dextrose infusion.    History of present Illness: This is a 63 y.o. male with a significant past medical history of benign prostatic hyperplasia, as patient has exposure, type 2 diabetes mellitus with diabetic peripheral neuropathy, peripheral artery disease [left foot ulcer s/p right below-knee amputation], and systemic hypertension, who presented to the emergency department with complaints of altered mental status. Patient apparently was found laying on the floor at home confused and being on the floor for probably up to 48 hours although duration is uncertain.  Initial blood sugar obtained by EMS was 87 mg/dL.  Initial blood pressure was 157/104 mmHg with pulse 123 and temperature 98.7  Chest x-ray showed cardiomegaly without acute pulmonary process.  Laboratory studies were remarkable for lactic acid 3.4 mmol/L; WBC 21.9 K; and BUN/creatinine 78/2.8 mg/dL.  Total CK was elevated at 3375.  His baseline renal function is normal although patient had episode of vancomycin related acute kidney injury during my encounter with him at [facility] in 2021.  He was admitted to the ICU and placed on BiPAP but required endotracheal intubation at about 6 PM on 6/21/2024.  He remains hemodynamically stable but has been febrile overnight with Tmax 102.9 °F.  He is oligoanuric.  Laboratory studies this morning shows worsening renal function with serum creatinine up to 4.2 mg/dL.  He also has tachycardia with heart rate 110 to 130 bpm.    Scheduled Meds:   pantoprazole (PROTONIX) 40 mg in sodium chloride 
    Department of Internal Medicine  Nephrology Karishma Pelayo MD Progress Note    Reason for consultation: Management of acute kidney injury.    Consulting physician: Seferino Denny MD    Interval history: Patient was seen and examined today and he remains intubated and on ventilator support.  He is hemodynamically stable.  He is oligoanuric.  He received acute hemodialysis yesterday.  He continues to have febrile episodes although Tmax is down to 101.4 °F.    History of present Illness: This is a 63 y.o. male with a significant past medical history of benign prostatic hyperplasia, as patient has exposure, type 2 diabetes mellitus with diabetic peripheral neuropathy, peripheral artery disease [left foot ulcer s/p right below-knee amputation], and systemic hypertension, who presented to the emergency department with complaints of altered mental status.  Patient apparently was found laying on the floor at home confused and being on the floor for probably up to 48 hours although duration is uncertain.  Initial blood sugar obtained by EMS was 87 mg/dL.  Initial blood pressure was 157/104 mmHg with pulse 123 and temperature 98.7  Chest x-ray showed cardiomegaly without acute pulmonary process.  Laboratory studies were remarkable for lactic acid 3.4 mmol/L; WBC 21.9 K; and BUN/creatinine 78/2.8 mg/dL.  Total CK was elevated at 3375.  His baseline renal function is normal although patient had episode of vancomycin related acute kidney injury during my encounter with him at [facility] in 2021.  He was admitted to the ICU and placed on BiPAP but required endotracheal intubation at about 6 PM on 6/21/2024.  He remains hemodynamically stable but has been febrile overnight with Tmax 102.9 °F.  He is oligoanuric.  Laboratory studies this morning shows worsening renal function with serum creatinine up to 4.2 mg/dL.  He also has tachycardia with heart rate 110 to 130 bpm.    Scheduled Meds:   cefepime  1,000 mg 
    Department of Internal Medicine  Nephrology Karishma Pelayo MD Progress Note    Reason for consultation: Management of acute kidney injury.    Consulting physician: Seferino Denny MD    Interval history: Patient was seen and examined today in the intensive care unit where he remains intubated and on ventilator support.  He is scheduled for possible left below-knee amputation today by podiatry [Dr. Fournier] for diabetic left foot ulcer.  He is hemodynamically stable and not requiring pressors.    History of present Illness: This is a 63 y.o. male with a significant past medical history of benign prostatic hyperplasia, as patient has exposure, type 2 diabetes mellitus with diabetic peripheral neuropathy, peripheral artery disease [left foot ulcer and prior s/p right below-knee amputation], and systemic hypertension, who presented to the emergency department with complaints of altered mental status. Patient apparently was found laying on the floor at home confused and being on the floor for probably up to 48 hours although duration is uncertain.  Initial blood sugar obtained by EMS was 87 mg/dL.  Initial blood pressure was 157/104 mmHg with pulse 123 and temperature 98.7  Chest x-ray showed cardiomegaly without acute pulmonary process.  Laboratory studies were remarkable for lactic acid 3.4 mmol/L; WBC 21.9 K; and BUN/creatinine 78/2.8 mg/dL.  Total CK was elevated at 3375.  His baseline renal function is normal although patient had episode of vancomycin related acute kidney injury during my encounter with him at [facility] in 2021.  He was admitted to the ICU and placed on BiPAP but required endotracheal intubation at about 6 PM on 6/21/2024.  He remains hemodynamically stable but has been febrile overnight with Tmax 102.9 °F.  He is oligoanuric.  Laboratory studies this morning shows worsening renal function with serum creatinine up to 4.2 mg/dL.  He also has tachycardia with heart rate 110 to 130 
    Today's Date: 6/24/2024  Patient Name: Ramon Roger  Date of admission: 6/21/2024 11:02 AM  Patient's age: 63 y.o., 1960  Admission Dx: Leg edema [R60.0]  Osteomyelitis (HCC) [M86.9]  Wound infection [T14.8XXA, L08.9]  Severe sepsis (HCC) [A41.9, R65.20]  Traumatic rhabdomyolysis, initial encounter ([facility]) [T79.6XXA]  Acute renal failure, unspecified acute renal failure type ([facility]) [N17.9]    Reason for Consult: management recommendations  Requesting Physician: Seferino Denny MD    CHIEF COMPLAINT: Thrombocytopenia.  Osteomyelitis.    History Obtained From:  electronic medical record, medical team, reason patient could not give history:  on ventilator        Interval history:    Patient seen and examined  Labs and vitals reviewed  Patient remains in the ICU.  Remains intubated  Having recurrent episodes of hypoglycemia  Scheduled for hemodialysis today  Seen by palliative care team.  Remains on broad-spectrum antibiotics  Lab workup shows hemoglobin 10.4.  Platelet count 60,000.            HISTORY OF PRESENT ILLNESS:      The patient is a 63 y.o.  male who is admitted   to the hospital after he was found on the floor by his family minimally responsive.  Patient found to be in sepsis after evaluation in the ER.  Workup revealed AMANDA UTI rhabdomyolysis.  Patient had a fever of 102.9 at presentation.  Patient also noted to be hypoglycemic with glucose of 64 when he was seen by EMS.  Patient started on antibiotics for sepsis due to osteomyelitis and UTI.  Hematology oncology team consulted due to progressive thrombocytopenia    Patient's platelet count at presentation was 155,000.  Has declined to 65,000.  WBC count 13.3 hemoglobin 10.8.  Peripheral blood also shows bandemia as well as immature white cells.  Patient noted to have adequate B12 folic acid stores.  Patient INR and PTT are within range.    Review of records from her facility also show patient platelet count has been within range.    Past 
    Today's Date: 6/25/2024  Patient Name: Ramon Roger  Date of admission: 6/21/2024 11:02 AM  Patient's age: 63 y.o., 1960  Admission Dx: Leg edema [R60.0]  Osteomyelitis (HCC) [M86.9]  Wound infection [T14.8XXA, L08.9]  Severe sepsis (HCC) [A41.9, R65.20]  Traumatic rhabdomyolysis, initial encounter ([facility]) [T79.6XXA]  Acute renal failure, unspecified acute renal failure type ([facility]) [N17.9]    Reason for Consult: management recommendations  Requesting Physician: Seferino Denny MD    CHIEF COMPLAINT: Thrombocytopenia.  Osteomyelitis.    History Obtained From:  electronic medical record, medical team, reason patient could not give history:  on ventilator        Interval history:    Patient seen and examined  Labs vitals reviewed  Patient remains in the ICU  Continues to be on broad-spectrum antibiotic  Patient started on tube feeds  Platelet count 74,000, improved since yesterday  Peripheral blood continues to show toxic granulation          HISTORY OF PRESENT ILLNESS:      The patient is a 63 y.o.  male who is admitted   to the hospital after he was found on the floor by his family minimally responsive.  Patient found to be in sepsis after evaluation in the ER.  Workup revealed AMANDA UTI rhabdomyolysis.  Patient had a fever of 102.9 at presentation.  Patient also noted to be hypoglycemic with glucose of 64 when he was seen by EMS.  Patient started on antibiotics for sepsis due to osteomyelitis and UTI.  Hematology oncology team consulted due to progressive thrombocytopenia    Patient's platelet count at presentation was 155,000.  Has declined to 65,000.  WBC count 13.3 hemoglobin 10.8.  Peripheral blood also shows bandemia as well as immature white cells.  Patient noted to have adequate B12 folic acid stores.  Patient INR and PTT are within range.    Review of records from her facility also show patient platelet count has been within range.    Past Medical History:   has a past medical history of 
    Today's Date: 6/28/2024  Patient Name: Ramon Roger  Date of admission: 6/21/2024 11:02 AM  Patient's age: 63 y.o., 1960  Admission Dx: Leg edema [R60.0]  Osteomyelitis (HCC) [M86.9]  Wound infection [T14.8XXA, L08.9]  Severe sepsis (HCC) [A41.9, R65.20]  Traumatic rhabdomyolysis, initial encounter ([facility]) [T79.6XXA]  Acute renal failure, unspecified acute renal failure type ([facility]) [N17.9]    Reason for Consult: management recommendations  Requesting Physician: Seferino Denny MD    CHIEF COMPLAINT: Thrombocytopenia.  Osteomyelitis.    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          HISTORY OF PRESENT ILLNESS:      The patient is a 63 y.o.  male who is admitted   to the hospital after he was found on the floor by his family minimally responsive.  Patient found to be in sepsis after evaluation in the ER.  Workup revealed AMANDA UTI rhabdomyolysis.  Patient had a fever of 102.9 at presentation.  Patient also noted to be hypoglycemic with glucose of 64 when he was seen by EMS.  Patient started on antibiotics for sepsis due to osteomyelitis and UTI.  Hematology oncology team consulted due to progressive thrombocytopenia    Patient's platelet count at presentation was 155,000.  Has declined to 65,000.  WBC count 13.3 hemoglobin 10.8.  Peripheral blood also shows bandemia as well as immature white cells.  Patient noted to have adequate B12 folic acid stores.  Patient INR and PTT are within range.    Review of records from her facility also show patient platelet count has been within range.    Past Medical History:   has a past medical history of 
   06/25/24 1545   Encounter Summary   Encounter Overview/Reason Attempted Encounter   Service Provided For Patient not available  (patient with staff)       
   06/26/24 1416   Encounter Summary   Encounter Overview/Reason Attempted Encounter   Service Provided For Patient not available  (Staff with PT)       
   06/29/24 1517   Encounter Summary   Encounter Overview/Reason Spiritual/Emotional Needs   Service Provided For Patient   Referral/Consult From Palliative Care   Last Encounter  06/29/24   Complexity of Encounter Low   Spiritual/Emotional needs   Type Spiritual Support   Palliative Care   Type Palliative Care, Follow-up   Assessment/Intervention/Outcome   Assessment Unable to assess   Intervention Prayer (assurance of)/[place]       
   06/30/24 1433   Encounter Summary   Encounter Overview/Reason Spiritual/Emotional Needs   Service Provided For Patient   Referral/Consult From Palliative Care   Last Encounter  06/30/24   Complexity of Encounter Low   Spiritual/Emotional needs   Type Spiritual Support   Palliative Care   Type Palliative Care, Follow-up   Assessment/Intervention/Outcome   Assessment Unable to assess   Intervention Prayer (assurance of)/[place]       
  [facility]  IN-PATIENT SERVICE  [facility]    PROGRESS NOTE             7/1/2024    12:42 PM    Name:   Ramon Roger  MRN:     [id number]     Acct:      [id number]   Room:   2008/2008-01   Day:  10  Admit Date:  6/21/2024 11:02 AM    PCP:  No primary care provider on file.  Code Status:  DNR-CC    Subjective:     C/C:   Chief Complaint   Patient presents with   • Altered Mental Status     Interval History Status: not changed.    Patient was seen and examined at bedside, failed weaning trial this morning.  Blood pressure remains 160-180  Plan for brain MRI today, however family would like to withdraw care per patient's preferences.  Family was seen at bedside discussing concerns with Dr. Prieto.  Will get hospice consult, palliative care on board.    Brief History:     Patient is a 63-year-old male past medical history significant for type 2 diabetes,  Amputation, peripheral neuropathy, CKD, presented to the ED for stool incontinence and generalized tiredness fatigue and altered mental status.  Patient was admitted for  septic arthritis.  An AMANDA that has been worsening, requiring dialysis per nephrology.  Patient was intubated and was admitted to the ICU.      Review of Systems:     Review of Systems   Reason unable to perform ROS: Patient is intubated with sedation.         Medications:     Allergies:  No Known Allergies    Current Meds:   Scheduled Meds:   Continuous Infusions:   • sodium chloride Stopped (06/27/24 1715)   • sodium chloride     • sodium chloride     • dextrose Stopped (06/24/24 1033)     PRN Meds: morphine, LORazepam, glycopyrrolate, anticoagulant sodium citrate, anticoagulant sodium citrate, bisacodyl, heparin (PF), anticoagulant sodium citrate, anticoagulant sodium citrate, sodium chloride flush, sodium chloride, sodium chloride flush, sodium chloride, ondansetron **OR** ondansetron, acetaminophen **OR** acetaminophen, dextrose bolus **OR** 
  HEMODIALYSIS PRE-TREATMENT NOTE    Patient Identifiers prior to treatment: name  mrn    Isolation Required: na                      Isolation Type: na       (please document if patient is being managed as a PUI/COVID-19 patient)        Hepatitis status:                           Date Drawn                             Result  Hepatitis B Surface Antigen 24     neg                     Hepatitis B Surface Antibody 24 neg        Hepatitis B Core Antibody 24 neg          How was Hepatitis Status verified: epic     Was a copy of the labs you documented provided to facility for the patient's chart: yes    Hemodialysis orders verified: yes    Access Within normal limits ( I.e. s/s of infection,...): yes     Pre-Assessment completed: yes    Pre-dialysis report received from: karen alexander rn                      Time: 1300    
  [facility]  IN-PATIENT SERVICE  [facility]    PROGRESS NOTE             6/28/2024    5:07 PM    Name:   Ramon Roger  MRN:     [id number]     Acct:      [id number]   Room:   2008/2008-01   Day:  7  Admit Date:  6/21/2024 11:02 AM    PCP:  No primary care provider on file.  Code Status:  DNR-CCA    Subjective:     C/C:   Chief Complaint   Patient presents with    Altered Mental Status     Interval History Status: not changed.    Patient seen and examined at bedside this morning.   Not able to tolerate sedation vacation or weaning trial off ventilator.  Patient was.  Day of sedation to evaluate if following commands or not.  Patient is sedated on 3 mg/hr of Versed.  ABG stable.   He continues to be on Zosyn and Vancomycin.  Cultures are final results and sensitivity from left knee culture sensitive to Zosyn  Glucose was elevated this morning (high 200's); switched from low-dose sliding scale to high-dose sliding.  Changed Lantus to 25 units subcu nightly from 15 units.  Patient was having worsening redness/swelling of right hand spreading likely cellulitis.  Doppler was ordered and done today which was negative for DVT.  Brief History:     63 y.o.  Non- / non  male with past medical history of type 2 diabetes mellitus insulin-dependent, history of below-knee amputation due to diabetic wound, peripheral neuropathy in bilateral upper and lower extremities on pregabalin, gabapentin, hypertension, hyperlipidemia, CKD with baseline creatinine 1.68 brought to ER by the family found passed out on floor soiled in stool, minimally responsive, last patient family visited him was on Tuesday, daughter at bedside mentioned that he he was not feeling well that day, but was at his baseline talking, he lives by himself and manages everything by himself he has prostatic for right leg, and walks by himself.     In ED patient was found to be tachycardic, body 
  [facility]   IN-PATIENT SERVICE      NEUROLOGY PROGRESS  NOTE            Date:   6/22/2024  Patient name:  Ramon Roger  Date of admission:  6/21/2024  YOB: 1960      Interval History:     No acute events.  Remains intubated, sedated.  Blood cultures positive.  He was evaluated by orthopedics, underwent knee arthroplasty and I&D.  Continues to have high fevers.  Has not had any further episodes of shivering.    Spoke to family at length.  They state that he has had prior similar presentations of altered mentation, shivering in setting of bacteremia and infections.  He has no prior history of seizures or seizure-like activity.  No history of stroke.    History of Present Illness:     63-year-old male with past medical history of diabetes, PAD, depression, peripheral polyneuropathy, status post right BKA, CKD, who was brought to the ED for altered mentation after he was found on the floor by family.  Neurology consulted for further evaluation.  History limited given patient's mentation.  Obtained primarily from primary team and chart review.     Patient was brought to the ED today after he was found by family on the floor, minimally responsive.  Appeared to be soiled in stool and urine.  He was last seen normal on Tuesday at his usual baseline although apparently wasn't feeling well.  In the ED, he was febrile, met sepsis criteria.  There was concern for hypoglycemia when he was found by EMS.  He was also noted to have AMANDA, UTI, rhabdomyolysis.  Per ED physician, he was initially confused but able to answer some questions.  He was then transferred to the ICU.  In the ICU, appears his mentation worsened. BG noted with be 21.  I was called to the bedside emergently.  Primary team had noticed that he was having generalized tremoring/shivering. Had fever 102.9.  He was given Ativan by primary team due to concern for possible seizure activity.  During my examination, he was obtunded, opened eyes 
  [facility]   IN-PATIENT SERVICE      NEUROLOGY PROGRESS  NOTE            Date:   6/30/2024  Patient name:  Ramon Roger  Date of admission:  6/21/2024  YOB: 1960      Interval History:     Patient has been switched from Versed to Precedex infusion.  He is actually more awake now eyes are open seems like he occasionally tries to track but not consistently.  He is not following commands.  No seizure-like activity has been witnessed.  MRI of the brain did get done showing small infarct in the right occipital region.    History of Present Illness:     Obtained from my partner:     63-year-old male with past medical history of diabetes, PAD, depression, peripheral polyneuropathy, status post right BKA, CKD, who was brought to the ED for altered mentation after he was found on the floor by family.  Neurology consulted for further evaluation.  History limited given patient's mentation.  Obtained primarily from primary team and chart review.     Patient was brought to the ED today after he was found by family on the floor, minimally responsive.  Appeared to be soiled in stool and urine.  He was last seen normal on Tuesday at his usual baseline although apparently wasn't feeling well.  In the ED, he was febrile, met sepsis criteria.  There was concern for hypoglycemia when he was found by EMS.  He was also noted to have AMANDA, UTI, rhabdomyolysis.  Per ED physician, he was initially confused but able to answer some questions.  He was then transferred to the ICU.  In the ICU, appears his mentation worsened. BG noted with be 21.  I was called to the bedside emergently.  Primary team had noticed that he was having generalized tremoring/shivering. Had fever 102.9.  He was given Ativan by primary team due to concern for possible seizure activity.  During my examination, he was obtunded, opened eyes briefly to noxious stimuli.  Intermittently, he was tracking, not following commands however.  Noted to 
  [facility]   IN-PATIENT SERVICE      NEUROLOGY PROGRESS  NOTE            Date:   6/25/2024  Patient name:  Ramon Roger  Date of admission:  6/21/2024  YOB: 1960      Interval History:     Currently sedated on Versed.  No seizure-like activity has been witnessed.  EEG showing mild to moderate encephalopathy.  Knee aspiration culture growing group A streptococcus as well as group A strep bacteremia.  Currently on IV Zosyn.    History of Present Illness:     Obtained from my partner:    63-year-old male with past medical history of diabetes, PAD, depression, peripheral polyneuropathy, status post right BKA, CKD, who was brought to the ED for altered mentation after he was found on the floor by family.  Neurology consulted for further evaluation.  History limited given patient's mentation.  Obtained primarily from primary team and chart review.     Patient was brought to the ED today after he was found by family on the floor, minimally responsive.  Appeared to be soiled in stool and urine.  He was last seen normal on Tuesday at his usual baseline although apparently wasn't feeling well.  In the ED, he was febrile, met sepsis criteria.  There was concern for hypoglycemia when he was found by EMS.  He was also noted to have AMANDA, UTI, rhabdomyolysis.  Per ED physician, he was initially confused but able to answer some questions.  He was then transferred to the ICU.  In the ICU, appears his mentation worsened. BG noted with be 21.  I was called to the bedside emergently.  Primary team had noticed that he was having generalized tremoring/shivering. Had fever 102.9.  He was given Ativan by primary team due to concern for possible seizure activity.  During my examination, he was obtunded, opened eyes briefly to noxious stimuli.  Intermittently, he was tracking, not following commands however.  Noted to have generalized shivering.  No clear seizure-like activity witnessed including gaze deviation or 
  [facility]  IN-PATIENT SERVICE  [facility]    PROGRESS NOTE             6/27/2024    9:10 AM    Name:   Ramon Roger  MRN:     [id number]     Acct:      [id number]   Room:   2008/2008-01   Day:  6  Admit Date:  6/21/2024 11:02 AM    PCP:  No primary care provider on file.  Code Status:  DNR-CCA    Subjective:     C/C:   Chief Complaint   Patient presents with    Altered Mental Status     Interval History Status: not changed.    Patient seen and examined at bedside this morning.   Patient was afebrile overnight however and borderline temp of 100 F.  Patient was hypertensive overnight; received his morning dose of hydralazine and overnight dose of Labetalol 20 mg.   Blood pressure controlled now.  Patient was sedated on 1 mg/hr of Versed; however now on sedation vacation.  Patient was not tachypneic.  ABG stable.   He continues to be on Zosyn.  Wound culture from left knee regarding stop biotin and from the foot growing Enterobacter.  Blood culture is also positive.  Patient will receive dialysis dialysis cath by IR; will need ID clearance due to positive blood culture.  ESR/CRP improved however white count is trending up.  Glucose was elevated this morning (300's); switched from low-dose sliding scale to high-dose sliding.    Brief History:     63 y.o.  Non- / non  male with past medical history of type 2 diabetes mellitus insulin-dependent, history of below-knee amputation due to diabetic wound, peripheral neuropathy in bilateral upper and lower extremities on pregabalin, gabapentin, hypertension, hyperlipidemia, CKD with baseline creatinine 1.68 brought to ER by the family found passed out on floor soiled in stool, minimally responsive, last patient family visited him was on Tuesday, daughter at bedside mentioned that he he was not feeling well that day, but was at his baseline talking, he lives by himself and manages everything by himself he has 
  [facility]   IN-PATIENT SERVICE      NEUROLOGY PROGRESS  NOTE            Date:   6/24/2024  Patient name:  Ramon Roger  Date of admission:  6/21/2024  YOB: 1960      Interval History:     6/24: Started having increased respiratory rate when sedation was decreased earlier this morning.  Now on Versed along with fentanyl.    6/23: Continues to be intubated, sedated.  No further episodes of shivering.  No seizure-like activity.  Underwent hemodialysis today.  Per RN, after about 75 minutes of sedation this morning, he did open eyes briefly, but was not tracking or following commands.    6/22: No acute events.  Remains intubated, sedated.  Blood cultures positive.  He was evaluated by orthopedics, underwent knee arthroplasty and I&D.  Continues to have high fevers.  Has not had any further episodes of shivering.    Spoke to family at length.  They state that he has had prior similar presentations of altered mentation, shivering in setting of bacteremia and infections.  He has no prior history of seizures or seizure-like activity.  No history of stroke.    History of Present Illness:     63-year-old male with past medical history of diabetes, PAD, depression, peripheral polyneuropathy, status post right BKA, CKD, who was brought to the ED for altered mentation after he was found on the floor by family.  Neurology consulted for further evaluation.  History limited given patient's mentation.  Obtained primarily from primary team and chart review.     Patient was brought to the ED today after he was found by family on the floor, minimally responsive.  Appeared to be soiled in stool and urine.  He was last seen normal on Tuesday at his usual baseline although apparently wasn't feeling well.  In the ED, he was febrile, met sepsis criteria.  There was concern for hypoglycemia when he was found by EMS.  He was also noted to have AMANDA, UTI, rhabdomyolysis.  Per ED physician, he was initially confused 
Attempted SBT, patient rate shot up to 41 from low 30s, placed back on full support for now.  
Attending Physician Statement  I have discussed the care of Ramon Roger and I have examined the patient myself and taken ROS and HPI, including pertinent history and exam findings, with the resident. I have reviewed the key elements of all parts of the encounter with the resident.  I agree with the assessment, plan and orders as documented by the resident.    Family family was present at the bedside, decision made to terminally extubate, will transfer him out of the ICU  Palliative/hospice consulted  Electronically signed by Annalisa Prieto MD    
Attending Physician Statement  I have discussed the care of Ramon Roger and I have examined the patient myself and taken ROS and HPI, including pertinent history and exam findings, with the resident. I have reviewed the key elements of all parts of the encounter with the resident.  I agree with the assessment, plan and orders as documented by the resident.  Patient seen and examined no overnight issues reported has been off Versed on Precedex opening eyes responding to pain not following any commands  Continues to be febrile blood culture from 2 days negative,  Get CT scan of the knee, continues to have purulent drainage from the knee, and CT scan of the right hand, to look for any underlying abscess  Ortho reconsulted  Patient also had wound on the fourth culture has been drawn  Recently changed to daptomycin  MRI showed acute infarct doubtful if that is causing this change in mentation, neurology on board, start aspirin resume Lipitor  Getting  Blood pressure was in 160s will keep permissive hypertension for now neck  Dose of Lantus increased  Dose of Lantus electronically signed by Annalisa Prieto MD    
Attending Physician Statement  I have discussed the care of Ramon Roger with the resident team. I have examined the patient myself and taken ros and hpi , including pertinent history and exam findings,  with the resident. I have reviewed the key elements of all parts of the encounter with the resident.  I agree with the assessment, plan and orders as documented by the resident.  Seen and examined, continues to be intubated sedated  AMANDA on CKD requiring dialysis likely second underlying ATN secondary to septic  Septic arthritis versus osteomyelitis  ID on board next  Developed new cellulitis of right hand,  Failed weaning trial, tachycardic tachypneic, will discuss with pulmonology if can be switched to t preCEDEX  Overall prognosis is poor, neurology on board head CT negative,  Electronically signed by Annalisa Prieto MD    
Avril Osorio NP notified of new consult  
BS found to be 21, residents at bedside. 1 amp D50 given. BS recheck was 99. BS at 1744 was 79 per Dr. Denny, give another amp of D50. IVF with dextrose being ordered.   
Bladder scan showing 325mL, medicine residents notified. New order to straight cath once  
[facility]   OCCUPATIONAL THERAPY MISSED TREATMENT NOTE   INPATIENT   Date: 24  Patient Name: Ramon Roger       Room:   MRN: [id number]   Account #: [id number]    : 1960  (63 y.o.)  Gender: male         REASON FOR MISSED TREATMENT:  24    -   Other - Pt currently intubated and sedated.  OT will continue to follow and attempt once medically appropriate.     11:30       Electronically signed by JASEN OSBORNE S/OT on 24 at 11:52 AM EDT   
Comprehensive Nutrition Assessment    Type and Reason for Visit:  Initial, Wound (Vent pt)    Nutrition Recommendations/Plan:   NPO.      Malnutrition Assessment:  Malnutrition Status:  At risk for malnutrition (Comment) (06/22/24 1411)    Context:  Acute Illness     Findings of the 6 clinical characteristics of malnutrition:  Energy Intake:  Unable to assess (Less than 50% for unknown length of time. Last known well 6/18/24.)  Weight Loss:  Unable to assess     Body Fat Loss:  Unable to assess     Muscle Mass Loss:  Unable to assess    Fluid Accumulation:  Mild (unknown if related to nutritional status) Generalized   Strength:  Not Performed    Nutrition Assessment:    Pt admitted with osteomyelitis, wound infection, severe sepsis, traumatic rhabdomyolysis and also found to be in acute renal failure. Plan for acute dialyisis and arthroscopic I&D left knee. RN states no plans for tube feeding today. Pt remains on vent.    Nutrition Related Findings:    Edema: +1 generalized. (Hx: diabetes mellitus, below-knee amputation due to diabetic wound, peripheral neuropathy in bilateral upper and lower extremities, hypertension, hyperlipidemia, CKD.) Wound Type: Multiple, Surgical Incision (Full -thickness ulceration, exposed deep fascia and muscle tissue, left foot , Full -thickness ulceration, exposed subcutaneous tissue, left foot. Refer to podiatry note.)       Current Nutrition Intake & Therapies:    Average Meal Intake: NPO     Diet NPO    Anthropometric Measures:  Height: 177.8 cm (5' 10\")  Ideal Body Weight (IBW): 166 lbs (75 kg)       Current Body Weight: 98.4 kg (216 lb 14.9 oz), 130.7 % IBW. Weight Source: Not Specified        Weight Adjustment For: Amputation  Total Adjusted Percentage (Calculated): 5.9  Adjusted Ideal Body Weight (lbs) (Calculated): 156.2 lbs  Adjusted Ideal Body Weight (kg) (Calculated): 71 kg  Adjusted % Ideal Body Weight (Calculated): 138.9  Adjusted BMI (kg/m2) (Calculated): 32.9  BMI 
Comprehensive Nutrition Assessment    Type and Reason for Visit:  Reassess    Nutrition Recommendations/Plan:   Continue tube feed at goal rate of 45 mL/h.  Continue addition of protein modular BID.      Malnutrition Assessment:  Malnutrition Status:  At risk for malnutrition (Comment) (06/22/24 1411)    Context:  Acute Illness     Findings of the 6 clinical characteristics of malnutrition:  Energy Intake:  Unable to assess (Less than 50% for unknown length of time. Last known well 6/18/24.)  Weight Loss:  Unable to assess     Body Fat Loss:  Unable to assess     Muscle Mass Loss:  Unable to assess    Fluid Accumulation:  Mild (unknown if related to nutritional status) Generalized   Strength:  Not Performed    Nutrition Assessment:    Pt tolerating tube feeding. Last BM today with active bowel sounds. Pt had sedation vacation this morning, was put back on sedation with Midazolam, Propofol not in use at this time. Will make no adjustment to tube feed due to elevated blood sugars at 328 today.    Nutrition Related Findings:    Edema: +2 generalized, RUE, LLE. Labs: Glu 328, POC glu 296, 264, 280. Other labs and meds reveiwed. Wound Type: Multiple, Surgical Incision       Current Nutrition Intake & Therapies:    Average Meal Intake: NPO     Diet NPO  ADULT TUBE FEEDING; Nasogastric; Renal Formula; Continuous; 15; Yes; 15; Q 4 hours; 45; 200; Q 6 hours; Protein; 1 Dose; BID  Current Tube Feeding (TF) Orders:  Feeding Route: Nasogastric  Formula: Renal Formula  Schedule: Continuous  Feeding Regimen: Goal rate of 45 mL/h.  Additives/Modulars: Protein  Water Flushes: 200 mL every 6 hour  Goal TF & Flush Orders Provides: EN and protein modulars provide: 2152 kcal/d, 139 g protein/d, 785 mL free water/d    Anthropometric Measures:  Height: 180.3 cm (5' 11\")  Ideal Body Weight (IBW): 172 lbs (78 kg)       Current Body Weight: 98.4 kg (216 lb 14.9 oz), 130.7 % IBW. Weight Source: Not Specified  Current BMI (kg/m2): 31.1   
Comprehensive Nutrition Assessment    Type and Reason for Visit:  Reassess    Nutrition Recommendations/Plan:   Continue tube feed at rate of 45 mL/h.  Continue addition of protein modular BID.     Malnutrition Assessment:  Malnutrition Status:  At risk for malnutrition (Comment) (06/22/24 1411)    Context:  Acute Illness     Findings of the 6 clinical characteristics of malnutrition:  Energy Intake:  Unable to assess (Less than 50% for unknown length of time. Last known well 6/18/24.)  Weight Loss:  Unable to assess     Body Fat Loss:  Unable to assess     Muscle Mass Loss:  Unable to assess    Fluid Accumulation:  Mild (unknown if related to nutritional status) Generalized   Strength:  Not Performed    Nutrition Assessment:    Pt tolerating tube feeding, Last BM 6/28. Per physician pt prognosis is poor. Until family meets will continue current level of care.    Nutrition Related Findings:    Edema: +2 non-pitting generalized, +3 pitting/weeping RUE, + 2 pitting LUE, trace RLE, LLE. Labs: Glu 289, , 267. Other labs and meds reviewed. Wound Type: Multiple, Stage II (Diabetic)       Current Nutrition Intake & Therapies:    Average Meal Intake: NPO     Diet NPO  ADULT TUBE FEEDING; Nasogastric; Renal Formula; Continuous; 15; Yes; 15; Q 4 hours; 45; 200; Q 6 hours; Protein; 1 Dose; BID  Current Tube Feeding (TF) Orders:  Feeding Route: Nasogastric  Formula: Renal Formula  Schedule: Continuous  Feeding Regimen: Goal rate of 45 mL/h.  Additives/Modulars: Protein  Water Flushes: 200 mL every 6 hour  Goal TF & Flush Orders Provides: EN and protein modulars provide: 2152 kcal/d, 139 g protein/d, 785 mL free water/d    Anthropometric Measures:  Height: 180.3 cm (5' 11\")  Ideal Body Weight (IBW): 172 lbs (78 kg)       Current Body Weight: 98.4 kg (216 lb 14.9 oz), 130.7 % IBW. Weight Source: Not Specified  Current BMI (kg/m2): 31.1        Weight Adjustment For: Amputation  Total Adjusted Percentage (Calculated): 
Comprehensive Nutrition Assessment    Type and Reason for Visit:  Reassess    Nutrition Recommendations/Plan:   Nepro at 45 mL/h provides 1944 kcal/d, 87 g protein/d, 785 mL free water.  Recommend adding 2 protein modulars adding 208 kcal/d and 52 g protein  In total this will provide 2152 kcal, 139 g protein     Malnutrition Assessment:  Malnutrition Status:  At risk for malnutrition (Comment) (06/22/24 1411)    Context:  Acute Illness     Findings of the 6 clinical characteristics of malnutrition:  Energy Intake:  Unable to assess (Less than 50% for unknown length of time. Last known well 6/18/24.)  Weight Loss:  Unable to assess     Body Fat Loss:  Unable to assess     Muscle Mass Loss:  Unable to assess    Fluid Accumulation:  Mild (unknown if related to nutritional status) Generalized   Strength:  Not Performed    Nutrition Assessment:    Pt remains on vent with Propofol of 9 mL/h. However per physician daily sedation vacations to continue, per RN notes current decrease in Propofol. Physician started tube feed today w/ goal rate of 45 mL/h this meets 80% of pt energy needs and 72% or protein needs. Will add 2 protein modulars this along with current Propofol rate will meet pt needs. Will continue to follow.    Nutrition Related Findings:    Edema: +1 generalized, RUE +2. Labs: K 3.3, Glu 121, POC glu 190, 158, 132. Other labs and meds reviewed. Wound Type: Multiple, Surgical Incision       Current Nutrition Intake & Therapies:    Average Meal Intake: NPO     Diet NPO  ADULT TUBE FEEDING; Nasogastric; Renal Formula; Continuous; 15; Yes; 15; Q 4 hours; 45; 200; Q 6 hours; Protein; 1 Dose; BID  Current Tube Feeding (TF) Orders:  Feeding Route: Nasogastric  Formula: Renal Formula  Schedule: Continuous  Feeding Regimen: Goal rate of 45 mL/h.  Additives/Modulars: Protein  Water Flushes: 200 mL every 6 hour  Goal TF & Flush Orders Provides: EN and protein modulars provide: 2152 kcal/d, 139 g protein/d, 785 mL free 
Dr Pelayo at bedside, aware of morning lab work, urine output this shift, he asks that when Dr Rae rounds, ask him to place a dialysis catheter and ask family if they consent to dialysis  
Dr Pelayo notified of CK 3392, creat 3.4, bladder scan shows 81ml but no void this shift, D5NS infusing at 125ml/hr, will recheck lab work in morning, continue IV fluids as ordered  
Dr. Denny at bedside along with residents. Pt came to ICU not responding, tachypneic with respirations ranging from  45-55, -130's. Decision made to make patient ICU and move him to another room based on his status per senior resident. Pt has been having tremors, questionable seizures?. Ativan 1 mg x2 given per Dr. Denny with no improvement. BS checked and D50 ordered and given. Dr. Rae notified of new consult, ABG drawn and results given to Dr. Rae. Dr. Irizarry requested at bedside and intubated patient, see sedation narration.    
Dr. Duenas at bedside, removed JAY drain.  Dr. Duenas had extensive conversation with patient's son, Barrie regarding infection and patient's knee.   Dr. Duenas and Podiatry resident discussed plan of care moving forward.   Podiatry Resident rounded and explained to son, Barrie that patient has osteomyelitis in left foot.  Explained that an amputation is likely in his future, if patient becomes more stable.  
Dr. Dupont notified of persistent hypertension. At bedside, spoke to Dr. Justice, they would like to go up on versed gtt and give fentanyl pushes for now   
Dr. Finley notified of new consult.   
Dr. Finley rounded, stated he is okay with DVT prophylaxis as long as platelets are above 50   
Dr. Haro at bedside, stated that patient isn't appropriate for MRI at this time.  
Dr. Justice at bedside and requests that a sedation vacation is done. Writer stopped versed gtt at this time.   
Dr. Justice rounded at bedside, updated on patient condition. Orders placed to d/c propofol and start versed gtt   
EEG completed. Report to follow.  
ETT advanced 2cm now located at 27 @ teeth  
ETT retracted 2cm per Dr Justice, 25cm@teeth now  
HEMODIALYSIS POST TREATMENT NOTE     Treatment time ordered: 3 hours    Actual treatment time: 2:44 h     UltraFiltration Goal: 1-2 Liters  UltraFiltration Removed: 0.6 Liters        Pre Treatment weight: 100.4 kg  Post Treatment weight: 100.2 kg  Estimated Dry Weight: tbd     Access used:     Central Venous Catheter:           Tunneled or Non-tunneled: non           Site: right neck           Access Flow: poor 150-200       Internal Access:       AV Fistula or AV Graft: na          Site: na        Access Flow: na        Sign and symptoms of infection: na       If YES: na     Medications or blood products given: na     Chronic outpatient schedule: yvette tx 2     Chronic outpatient unit: na     Summary of response to treatment: Blood pressure was at baseline during treatment, could not run patient at the ordered BFR rate of 350 d/t AP alarms sounding AP <-240 most of treatment. Interventions include NS flushes given for AP, lines reversed didn't help so lines returned to original ports, patient was repositioned and laid flat to improve BFR primary RN notified ensure new position didn't interfere with vent.  Dr Huerta notified of issues with blood flow, and low BFR.  Per Dr. Huerta treatment terminated early and order for a tunneled catheter to be placed.     Explain if orders NOT met, was physician notified:kelechi        ACES flowsheet faxed to patient unit/ placed in patient chart: yes     Post assessment completed: yes     Report given to: Chip Myles RN        * Intra-treatment documented Safety Checks include the followin) Access and face visible at all times.     2) All connections and blood lines are secure with no kinks.     3) NVL alarm engaged.     4) Hemosafe device applied (if applicable).     5) No collapse of Arterial or Venous blood chambers.     6) All blood lines / pump segments in the air detectors.            
HEMODIALYSIS POST TREATMENT NOTE     Treatment time ordered: 3 hours    Actual treatment time: 2:44 h     UltraFiltration Goal: 1-2 Liters  UltraFiltration Removed: 0.6 Liters        Pre Treatment weight: 103.5 kg  Post Treatment weight: 101.5 kg  Estimated Dry Weight: tbd     Access used:     Central Venous Catheter:           Tunneled or Non-tunneled: non           Site: right neck           Access Flow: lines reversed       Internal Access:       AV Fistula or AV Graft: na          Site: na        Access Flow: na        Sign and symptoms of infection: na       If YES: na     Medications or blood products given: na     Chronic outpatient schedule: yvette tx 2     Chronic outpatient unit: na     Summary of response to treatment: Blood pressure was at baseline during treatment, lines reversed d/t arterial port not drawing blood  per ordered.  2 Liters removed.      Explain if orders NOT met, was physician notified:kelechi        ACES flowsheet faxed to patient unit/ placed in patient chart: yes     Post assessment completed: yes     Report given to: Zoe JEWELL        * Intra-treatment documented Safety Checks include the followin) Access and face visible at all times.     2) All connections and blood lines are secure with no kinks.     3) NVL alarm engaged.     4) Hemosafe device applied (if applicable).     5) No collapse of Arterial or Venous blood chambers.     6) All blood lines / pump segments in the air detectors.         
HEMODIALYSIS POST TREATMENT NOTE    Treatment time ordered: 2h    Actual treatment time: 2h    UltraFiltration Goal: 0  UltraFiltration Removed: 0      Pre Treatment weight: 99.9kg  Post Treatment weight:   Estimated Dry Weight:     Access used:     Central Venous Catheter:          Tunneled or Non-tunneled: non           Site: right neck          Access Flow: good 350      Internal Access:       AV Fistula or AV Graft: na         Site: na       Access Flow: na       Sign and symptoms of infection: na       If YES: na    Medications or blood products given: na    Chronic outpatient schedule: yvette tx 1    Chronic outpatient unit:     Summary of response to treatment: elise well    Explain if orders NOT met, was physician notified:kelechi      ACES flowsheet faxed to patient unit/ placed in patient chart: yes    Post assessment completed: yes    Report given to: karen alexander rn      * Intra-treatment documented Safety Checks include the followin) Access and face visible at all times.     2) All connections and blood lines are secure with no kinks.     3) NVL alarm engaged.     4) Hemosafe device applied (if applicable).     5) No collapse of Arterial or Venous blood chambers.     6) All blood lines / pump segments in the air detectors.   
HEMODIALYSIS POST TREATMENT NOTE    Treatment time ordered: 3h    Actual treatment time: 3h    UltraFiltration Goal: 2l  UltraFiltration Removed: 1.7l      Pre Treatment weight: 100.7kg  Post Treatment weight:   Estimated Dry Weight: tbd    Access used:     Central Venous Catheter:          Tunneled or Non-tunneled: non           Site: right neck          Access Flow: fair, 250-300      Internal Access:       AV Fistula or AV Graft: na         Site: na       Access Flow: na       Sign and symptoms of infection: na       If YES: na    Medications or blood products given: na    Chronic outpatient schedule: yvette tx 2    Chronic outpatient unit: na    Summary of response to treatment: elise well    Explain if orders NOT met, was physician notified:kelechi      ACES flowsheet faxed to patient unit/ placed in patient chart: yes    Post assessment completed: yes    Report given to: karen alexander rn      * Intra-treatment documented Safety Checks include the followin) Access and face visible at all times.     2) All connections and blood lines are secure with no kinks.     3) NVL alarm engaged.     4) Hemosafe device applied (if applicable).     5) No collapse of Arterial or Venous blood chambers.     6) All blood lines / pump segments in the air detectors.   
High PIP alarming at 50, attempted sxn 3x with small thick white secretions. RT then instilled the ET with saline and AMBU ventilated pt prior to suctioning. Moderate sized tan mucous plug removed from pt. PIP immediately decreased to 20s. RN notified.  
I was called to the floor to evaluate this patient for possible intubation.  I first went up to the floor because patient was unresponsive and shaking and tachypneic.  Patient was hypoglycemic when he first came and so we had him checked the sugar which initially was 21.  They gave an amp of dextrose patient's blood sugar went up to 99 but patient is still unresponsive and does not look well so the attending physician requested that I intubate the patient.  Was not able to get any consent due to the patient's GCS.  Uneventful intubation.  Care was taken over by the admitting team.    Intubation Procedure Note    Indication: potential airway compromise    Consent: Unable to be obtained due to the emergent nature of this procedure.    Medications Used: midazolam intravenously and rocuronium intravenously    Procedure: The patient was placed in the appropriate position.  Cricoid pressure was not required.  Intubation was performed by indirect laryngoscopy using a glidoscope and a 7.5 cuffed endotracheal tube.  The cuff was then inflated and the tube was secured appropriately at a distance of 23 cm to the dental ridge.  Initial confirmation of placement included bilateral breath sounds, an end tidal CO2 detector, absence of sounds over the stomach, tube fogging, adequate chest rise, and adequate pulse oximetry reading.  A chest x-ray to verify correct placement of the tube has been ordered but is still pending.    The patient tolerated the procedure well.     Complications: None      
[facility] notified of GCS of 5. Referral number [id number].  
Medicine resident spoke with Dr. Pelayo, received orders to discontinue D10. Gtt stopped   
Medicine residents notified of 103.2 fever, order received for cooling blanket, okay with Dr. Rae   
Medicine residents notified of worsening redness/swelling of right hand, spreading past original marked outline from early this morning. Order placed for doppler     
Meeting held with family with residents discussing code status, current plan of care, goals and doctor's recommendations. Pt's son, daughters and Pt's ex-spouse discussed that they believe patient would not want to be on a ventilator, loose his other foot and be on dialysis. Meeting ended with patient to have dialysis tomorrow and MRA to see if patient begins to follow commands tomorrow. Family very realistic with patient's quality of care and condition. Palliative care following.   
[facility]  Progress Note      NAME:  Ramon Roger  MEDICAL RECORD NUMBER:  [id number]  AGE: 63 y.o.   GENDER: male  : 1960  TODAY'S DATE:  2024    [facility] nurse consult noted for left foot wounds.  Patient in IR at this time.  Will try to evaluate patient tomorrow.  Recommend discussing goals of care with podiatry.  If they are still planning to do a transmetatarsal amputation, would just recommend a saline wet-to-dry dressing until that can be done.  If goal is wound healing, then will provide recommendations.     DEREK GillN, RN, CWOCN, [facility]  Wound, Ostomy, and Continence Nursing  [phone]          
NPO for procedure  
Neurology notified of MRI results of acute infarct of R occipital lobe. No orders received at this time. Residents has held all of patient's oral BP medications at this time.   
New order from Dr. Pelayo to change IVF to D5 & 0.9% at 75 due to hypoglycemia  
New orders placed for 5mg lopressor IV once and scheduled clonidine   
[facility]   OCCUPATIONAL THERAPY MISSED TREATMENT NOTE   INPATIENT   Date: 24  Patient Name: Ramon Roger       Room:   MRN: [id number]   Account #: [id number]    : 1960  (63 y.o.)  Gender: male                 REASON FOR MISSED TREATMENT:  Patient unable to participate   -    Patient is currently intubated and tentatively scheduled for L transmetatarsal amputation this date. OT will continue to follow and attempt once medically appropriate.  0912        Electronically signed by DARWIN Collier on 24 at 11:21 AM EDT    
Order for tunneled dialysis catheter received. Patient has positive blood cultures. Discussed with nurse. Nurse to call IR back with clarification of order.   
Order received from Dr. Justice to wean off versed gtt and start precedex   
Palliative care nurse Reached out to Stacy with [facility]. Patient is no longer a candidate for organ retrieval.       
[facility]   OCCUPATIONAL THERAPY MISSED TREATMENT NOTE   INPATIENT   Date: 24  Patient Name: Ramon Roger       Room:   MRN: [id number]   Account #: [id number]    : 1960  (63 y.o.)  Gender: male                 REASON FOR MISSED TREATMENT:  Patient not appropriate for occupational therapy evaluation    -   Patient remains intubated and sedated. OT will continue to follow and see patient when medically appropriate.     Electronically signed by DARWIN Guardado on 24 at 7:47 AM EDT   
Patient Identifiers prior to treatment: name  mrn     Isolation Required: na                      Isolation Type: na        (please document if patient is being managed as a PUI/COVID-19 patient)           Hepatitis status:                                                                     Date Drawn                             Result  Hepatitis B Surface Antigen 24     neg                        Hepatitis B Surface Antibody 24 neg           Hepatitis B Core Antibody 24 neg              How was Hepatitis Status verified: epic     Was a copy of the labs you documented provided to facility for the patient's chart: yes     Hemodialysis orders verified: yes     Access Within normal limits ( I.e. s/s of infection,...): yes      Pre-Assessment completed: yes     Pre-dialysis report received from:  Chip JEWELL                      Time: 9:30     
Patient Identifiers prior to treatment: name  mrn     Isolation Required: na                      Isolation Type: na        (please document if patient is being managed as a PUI/COVID-19 patient)           Hepatitis status:                                                                     Date Drawn                             Result  Hepatitis B Surface Antigen 24     neg                        Hepatitis B Surface Antibody 24 neg           Hepatitis B Core Antibody 24 neg              How was Hepatitis Status verified: epic     Was a copy of the labs you documented provided to facility for the patient's chart: yes     Hemodialysis orders verified: yes     Access Within normal limits ( I.e. s/s of infection,...): yes      Pre-Assessment completed: yes     Pre-dialysis report received from:  Zoe JEWELL                      Time: 14:30              
Patient Identifiers prior to treatment: name  mrn     Isolation Required: na                      Isolation Type: na        (please document if patient is being managed as a PUI/COVID-19 patient)           Hepatitis status:                                                                     Date Drawn                             Result  Hepatitis B Surface Antigen 24     neg                        Hepatitis B Surface Antibody 24 neg           Hepatitis B Core Antibody 24 neg              How was Hepatitis Status verified: epic     Was a copy of the labs you documented provided to facility for the patient's chart: yes     Hemodialysis orders verified: yes     Access Within normal limits ( I.e. s/s of infection,...): yes      Pre-Assessment completed: yes by Fransisco Olson RN     Pre-dialysis report received from:  Marcia Salcedo RN                      Time: 16:45     
Patient breathing labored and irregular, RR 30. Versed resumed at this time   
Patient extubated per physician order. Patient extubated in usual fashion. Patient placed on  3 liters/min via nasal cannula.     NAIMA ERICKSON RCP   11:10 AM   
Patient having increased RR. Patient repositioned for comfort. Versed increased. PRN Fentanyl given. RRT at bedside for morning rounds.   
Patient left via EMS to Hospice, IVs removed, report given to transport.   
Patient noted to have this increase and swelling in right hand and arm. See pictures attached.                
Patient tested on CPAP/PS while on sedation, RR increased from 26 up to 39, placed back on full support.  
Patient's family members would like to withdraw care on patient tomorrow at 9 am d/t making sure family members can all be present. Dr. Justice notified. Per Dr. Justice, RN to update oncoming nurse to notify Dr. Rae tomorrow morning.   
Patient's family requested to speak to writer in waiting room to inquire about the process of \"being less aggressive\", de-escalating care. They states that he has expressed his wishes very clearly and this current treatment plan happened due to the unplanned event of the patient being found down at home. They say that he has very clearly told them that he did not want to have his foot amputated, he didn't want hemodialysis, and would definitely would not want a trach and PEG.   They asked so we discussed the process of withdrawing care, the potential need for Hospice care.  Writer told them that I would communicate their wishes to the physicians.  They state their hope/plan is for withdrawal of care for tomorrow morning.   Writer did explain that Dr. Justice will be signing out to Dr. Rae, who will be his Pulm/CC doc next week.Primary RN, Brian communicated this info to Dr. Justice.  
Patients blood sugar 54. D10 125 ml bolus given. Blood sugar stabilized, will continue to monitor Q2 checks.   
Patients blood sugar 57, Dr. Pelayo notified of second D10 bolus given overnight, and 6th in the last 24 hours. He would like D10 100 mL/hr in addition to his maintenance fluids.   
Patients eyes open, tracking writer, able to nod at times/ blink his eyes twice for yes and once for no. I asked the patient to turn and look at me if he could understand and hear me and he did so appropriately. He is not able to grasp my hands. Decreased precedex to 0.2, respirations remain within patients baseline.   
Per DENIS Hernandez. Okay to restart Tube Feeds.   
Pharmacy Consult      Ramon Roger is a 63 y.o. male for whom pharmacy has been consulted to dose cefepime for sepsis, bone and joint, and SSTI.    Patient Active Problem List   Diagnosis    Type 1 diabetes mellitus with diabetic mononeuropathy (HCC)    Essential hypertension    Mixed hyperlipidemia    Diabetic mononeuropathy associated with type 1 diabetes mellitus (HCC)    Lung nodule    Chronic low back pain    Chronic right shoulder pain    Bradycardia    Osteomyelitis (HCC)       Allergies:  Patient has no known allergies.     Recent Labs     06/21/24  1120   CREATININE 2.8*       Ht/Wt:   Ht Readings from Last 1 Encounters:   06/21/24 1.778 m (5' 10\")        Wt Readings from Last 1 Encounters:   06/21/24 99.8 kg (220 lb)         Estimated Creatinine Clearance: 31 mL/min (A) (based on SCr of 2.8 mg/dL (H)).    Assessment/Plan: Patient's renal status with crcl of 31.  Based on this will give 2 gm every 12 hours over extended infusion of 4 hours.  Patient already received loading dose of 2 gm at 1141.  Additional dose corrections will occur if renal status improves.        Thank you for the consult.  Will continue to follow.    Reji Page,  [email or web address].  6/21/2024  7:10 PM         
Pharmacy Consult      Ramon Roger is a 63 y.o. male for whom pharmacy has been consulted to dose daptomycin for foot wound and cellulitis right arm.    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     Recent Labs     06/29/24  0349 06/30/24  0648   CREATININE 2.6* 3.3*       Ht/Wt:   Ht Readings from Last 1 Encounters:   06/24/24 1.803 m (5' 11\")        Wt Readings from Last 1 Encounters:   06/30/24 102 kg (224 lb 13.9 oz)         Estimated Creatinine Clearance: 27 mL/min (A) (based on SCr of 3.3 mg/dL (H)).    Assessment/Plan:   Will dose 6 mg/kg using adjusted weight on Mondays and Wednesdays and use 9 mg/ kg on Friday to cover for the weekend.    Dose will start today as vancomycin trough is ~ 20.     Monday/Wednesday dose is 500 mg and Friday's dose will be 750 mg.  Will monitor CPK levels weekly and hold atorvastatin.    CPK level yesterday 6/29/24 was 42.     Monitor signs and symptoms of infection. CPK should be monitored at least 
Pharmacy Medication History Note      List of current medications patient is taking is complete.    Source of information: patient's family, [employer], [facility], [facility] ([place], [facility], [phone]), Care Everywhere    Changes made to medication list:  Medications removed (include reason, ex. therapy complete or physician discontinued, noncompliance):  Gabapentin - discontinued per notes from Dr. Montaño on 4/29/24    Medications flagged for provider review:  Ibuprofen - not taking   Quinapril - not taking     Medications added/doses adjusted:  Amlodipine 5 mg daily   Hydralazine 100 mg three times daily   Probiotic 1 capsule 3 times daily   Novolog changed to 20 units three times daily with meals   Percocet 5-325 mg take 1 tablet by mouth every other day as needed   Pantoprazole 40 mg twice daily   Pregabalin 150 mg daily   Tresiba increased to 120 units twice daily     Other notes (ex. Recent course of antibiotics, Coumadin dosing):  Per [facility], last fill of Percocet was on 4/11/24 with quantity 30 for 60 days.   Per [facility], last fill of pregabalin was on 4/11/24 with quantity 90 for 90 days.   On 5/21/24, the patient received a 7 day course of daptomycin.       Current Home Medication List at Time of Admission:  Prior to Admission medications    Medication Sig   amLODIPine (NORVASC) 5 MG tablet Take 1 tablet by mouth daily   hydrALAZINE (APRESOLINE) 100 MG tablet Take 1 tablet by mouth 3 times daily   Lactobacillus (ACIDOPHILUS/PECTIN) 100 MG CAPS Take 1 capsule by mouth in the morning, at noon, and at bedtime   pantoprazole (PROTONIX) 40 MG tablet Take 1 tablet by mouth in the morning and at bedtime   pregabalin (LYRICA) 150 MG capsule Take 1 capsule by mouth daily. Max Daily Amount: 150 mg   oxyCODONE-acetaminophen (PERCOCET) 5-325 MG per tablet Take 1 tablet by mouth every 48 hours as needed for Pain. Max Daily Amount: 1 tablet   ibuprofen (ADVIL;MOTRIN) 600 MG tablet Take 600 mg by mouth nightly  Patient not taking: 
Pharmacy Note  Vancomycin Consult - Daily note   Vancomycin Therapy Day: 2  Current Dosing: per level   Current diagnosis for which MRSA is suspected/confirmed: bacteremia/osteo  ONLY for suspected pneumonia or COPD: MRSA nasal swab   N/A: Non respiratory infection..        Last Temp: 103  Actual Weight:   Wt Readings from Last 1 Encounters:   06/22/24 98.4 kg (217 lb)     Recent Labs     06/21/24  2018 06/22/24  0425   CREATININE 3.4* 4.2*     CrCl:  Patient is on dialysis.  Recent Labs     06/21/24  1120 06/22/24  0425   WBC 21.9* 14.0*       Intake/Output Summary (Last 24 hours) at 6/22/2024 0711  Last data filed at 6/22/2024 0500  Gross per 24 hour   Intake 1098.26 ml   Output 475 ml   Net 623.26 ml       Recent vancomycin administrations                     vancomycin (VANCOCIN) 2,500 mg in sodium chloride 0.9 % 500 mL IVPB (mg) 2,500 mg New Bag 06/21/24 1309                    Vancomycin Concentrations:   TROUGH:  No results for input(s): \"VANCOTROUGH\" in the last 72 hours.  RANDOM:    Recent Labs     06/22/24  0425   VANCORANDOM 27.6       ASSESSMENT/PLAN    Random level 27.6 today ~17 hours post dose. Will hold today and recheck a level with am labs tomorrow. HD scheduled for today.         Pharmacy will Continue to follow.  Thank you.  Katarina Yañez RP, RP/PharmD  6/22/2024  7:11 AM    Intermittent Hemodialysis: Not using bayesian software for dosing  Maintenance Dosing = 10-20 mg/kg after HD sessions (max 2,000 mg per dose).  Timing of concentration monitoring:  Critically ill - pre-HD level after the loading dose       Stable/stable dialysis:  pre-HD concentration after 1st or 2nd MD on dialysis 3 times/week  Pre-dialysis level Invasive MRSA Infection or Sepsis Non-Invasive Infection or Non-MRSA Infection   ? 25 mg/L Hold vancomycin dose, ? subsequent dose by 250 mg Hold vancomycin dose, ? subsequent dose by 250-500 mg   21-24 mg/L Continue current dose Decrease dose by 250 mg   15-20 mg/L Increase dose 
Pharmacy Note  Vancomycin Consult - Daily note   Vancomycin Therapy Day: 2 but patient received a 2500 mg dose on 6/21/24  Current Dosing: pulse dosing per dialysis protocol.  Current diagnosis for which MRSA is suspected/confirmed: osteomyelitis  ONLY for suspected pneumonia or COPD: MRSA nasal swab   N/A: Non respiratory infection..        Last Temp: 99 F  Actual Weight:   Wt Readings from Last 1 Encounters:   06/28/24 103.8 kg (228 lb 13.4 oz)     Recent Labs     06/27/24  0332 06/28/24  0443   CREATININE 3.3* 3.6*     CrCl:  Patient is on dialysis.  Recent Labs     06/27/24  0332 06/28/24  0443   WBC 16.9* 14.9*       Intake/Output Summary (Last 24 hours) at 6/28/2024 1255  Last data filed at 6/28/2024 1200  Gross per 24 hour   Intake 2067.82 ml   Output 2055 ml   Net 12.82 ml       Recent vancomycin administrations                     vancomycin (VANCOCIN) 1,750 mg in sodium chloride 0.9 % 500 mL IVPB (mg) 1,750 mg New Bag 06/27/24 1717                    Vancomycin Concentrations:   TROUGH:  No results for input(s): \"VANCOTROUGH\" in the last 72 hours.  RANDOM:    Recent Labs     06/26/24  0350 06/28/24  0443   VANCORANDOM 8.3 20.9       ASSESSMENT/PLAN    Patient to get dialysis today at around 3 pm to 7 pm,  will give another 1750 mg dose per protocol after dialysis today        Pharmacy will Continue to follow.  Thank you.  tyler mello, [facility].  6/28/2024  12:55 PM    Intermittent Hemodialysis: Not using bayesian software for dosing  Maintenance Dosing = 10-20 mg/kg after HD sessions (max 2,000 mg per dose).  Timing of concentration monitoring:  Critically ill - pre-HD level after the loading dose       Stable/stable dialysis:  pre-HD concentration after 1st or 2nd MD on dialysis 3 times/week  Pre-dialysis level Invasive MRSA Infection or Sepsis Non-Invasive Infection or Non-MRSA Infection   ? 25 mg/L Hold vancomycin dose, ? subsequent dose by 250 mg Hold vancomycin dose, ? subsequent dose by 250-500 mg 
Pharmacy Note  Vancomycin Consult - Daily note   Vancomycin Therapy Day: 3  Current Dosing: pulse dosing per dialysis protocol  Current diagnosis for which MRSA is suspected/confirmed:  bone infection  ONLY for suspected pneumonia or COPD: MRSA nasal swab   N/A: Non respiratory infection..        Last Temp: 100 F  Actual Weight:   Wt Readings from Last 1 Encounters:   06/29/24 102 kg (224 lb 13.9 oz)     Recent Labs     06/28/24  0443 06/29/24  0349   CREATININE 3.6* 2.6*     CrCl:  Patient is on dialysis.  Recent Labs     06/28/24  0443 06/29/24  0349   WBC 14.9* 13.2*       Intake/Output Summary (Last 24 hours) at 6/29/2024 0709  Last data filed at 6/29/2024 0521  Gross per 24 hour   Intake 3486.5 ml   Output 4505 ml   Net -1018.5 ml       Recent vancomycin administrations                     vancomycin (VANCOCIN) 1,750 mg in sodium chloride 0.9 % 500 mL IVPB (mg) 1,750 mg New Bag 06/28/24 1933    vancomycin (VANCOCIN) 1,750 mg in sodium chloride 0.9 % 500 mL IVPB (mg) 1,750 mg New Bag 06/27/24 1717                    Vancomycin Concentrations:   TROUGH:  No results for input(s): \"VANCOTROUGH\" in the last 72 hours.  RANDOM:    Recent Labs     06/28/24 0443   VANCORANDOM 20.9       ASSESSMENT/PLAN    Patient received 1750 mg yesterday after dialysis, but is making urine, so will check trough level tomorrow am.        Pharmacy will Continue to follow.  Thank you.  tyler mello, [facility]  6/29/2024  7:09 AM    Intermittent Hemodialysis: Not using bayesian software for dosing  Maintenance Dosing = 10-20 mg/kg after HD sessions (max 2,000 mg per dose).  Timing of concentration monitoring:  Critically ill - pre-HD level after the loading dose       Stable/stable dialysis:  pre-HD concentration after 1st or 2nd MD on dialysis 3 times/week  Pre-dialysis level Invasive MRSA Infection or Sepsis Non-Invasive Infection or Non-MRSA Infection   ? 25 mg/L Hold vancomycin dose, ? subsequent dose by 250 mg Hold vancomycin dose, 
Physical Therapy          Physical Therapy Cancel Note      DATE: 2024    NAME: Ramon Roger  MRN: [id number]   : 1960      Patient not seen this date for Physical Therapy due to:    Patient unable to participate   -    Patient is currently intubated and tentatively scheduled for L transmetatarsal amputation this date. PT will continue to follow and attempt once medically appropriate       Electronically signed by Martina Lynch PT on 2024 at 12:15 PM    
Physical Therapy          Physical Therapy Cancel Note      DATE: 2024    NAME: Ramon Roger  MRN: [id number]   : 1960      Patient not seen this date for Physical Therapy due to:    Pt on vent support, will continue to follow      Electronically signed by Martina Lynch PT on 2024 at 11:16 AM    
Physical Therapy        Physical Therapy Cancel Note      DATE: 2024    NAME: Ramon Roger  MRN: [id number]   : 1960      Patient not seen this date for Physical Therapy due to:    Patient is not appropriate for PT evaluation/treatment at this time d/t pt still on sedation and ventilator this date. PT to follow and assess when able to safely participate.       Electronically signed by Tiff Ghosh PT on 2024 at 7:41 AM      
Pt placed on weaning trial CPAP/PS   
Pt son Barrie updated on pt lab work, request from Dr Pelayo that pt receive dialysis, he consents to allow treatment  
Pt temp 103, tylenol given and ice packs applied to bilat axilla  
Pt to CT with RN and RT, cardiac monitor on  
Pt to have dialysis at 1330 today. Will check after pt back from dialysis if pt can have MRI. BEST Camejo said MRI to wait after neuro rounds to see pt.   
Pt was being taken for a procedure; his ex-SABA and ex-MIL were in hallway and spoke of concerns to writer. They are all still close as a family even through divorce. Writer encouraged them to speak of the pt's son who is the HPOA and if he has concerns, he could speak to the medical team. Writer provided listening presence and prayer.    06/27/24 1744   Encounter Summary   Encounter Overview/Reason Spiritual/Emotional Needs   Service Provided For Family   Referral/Consult From Palliative Care   Support System Family members;Children   Last Encounter  06/27/24   Complexity of Encounter High   Begin Time 1530   End Time  1545   Total Time Calculated 15 min   Spiritual/Emotional needs   Type Spiritual Support;Emotional Distress   Palliative Care   Type Palliative Care, Family Care   Assessment/Intervention/Outcome   Assessment Anxious;Impaired resilience;Concerns with suffering   Intervention Active listening;Explored/Affirmed feelings, thoughts, concerns;Prayer (assurance of)/[place];Sustaining Presence/Ministry of presence   Outcome Comfort;Engaged in conversation;Expressed feelings, needs, and concerns;Expressed Gratitude;Receptive;Venting emotion       
RRT at bedside for terminal extubation, restraints removed from patient and order discontinued by NP.   
RRT at bedside to place patient on wean trial.    
Reached out to Dr. Rosales regarding order for tunneled cath and positive blood cultures, medicine residents to place order for repeat blood cultures  
Resident team notified of bladder scan showing 470 mL. Order received for straight cath.     Pt voided before requiring straight cath, residents notified. External catheter remains in place.   
Responded to perfect serve message from Palliative Care to support family of pt at time of terminal wean.  Met with family gathered in ICU waiting room to introduce myself and the role of .  Said prayer with family in waiting room and with pt. Family tearful but supportive and loving toward each other and committed to living out the pt's wishes         07/01/24 1058   Encounter Summary   Encounter Overview/Reason Grief, Loss, and Adjustments   Service Provided For Patient;Family   Referral/Consult From Palliative Care   Last Encounter  07/01/24   Complexity of Encounter Moderate   Spiritual/Emotional needs   Type Spiritual Support   Grief, Loss, and Adjustments   Type End of Life;Anticipatory Grief   Palliative Care   Type Palliative Care, Family Care   Assessment/Intervention/Outcome   Assessment Coping;Peaceful;Sad;Tearful   Intervention Active listening;End of Life Care;Explored/Affirmed feelings, thoughts, concerns;Prayer (assurance of)/[place];Sustaining Presence/Ministry of presence   Outcome Comfort;Grieving;Receptive             
SBT attempted CPAP 5 PS 7, patient did not tolerate. RR shot from low 30s up to 49 within seconds. Placed back on full support, RR back down to 32.  
SBT attempted, patient RR increased from 32 up to 48, and ETCO2 dropped down to 8, placed back on full support.  
SBT started. CPAP 7 PS 7    Placed back on full support at previous settings due to increased RR, HR, BP.  
Sarah Martinez notified of repeat blood cultures being drawn   
Sedated paused for sedation vacation   
Sedation paused and patient placed on wean per Dr. Justice  
Sedation paused for sedation vacation   
Sedation paused for sedation vacation. Placed on cpap, RR increased to 45. Flipped back to full support per RT   
Several staff were in pt's room; writer was given update by BEST Camejo. Writer visited with family in ICU waiting room; 2 daughters, son and grandchildren. They were calm and coping, acknowledging pt is very sick. Writer let family know chaplains are available whenever needed.    06/24/24 1821   Encounter Summary   Encounter Overview/Reason Spiritual/Emotional Needs   Service Provided For Family   Referral/Consult From Palliative Care   Support System Children   Last Encounter  06/24/24   Complexity of Encounter Moderate   Spiritual/Emotional needs   Type Spiritual Support   Palliative Care   Type Palliative Care, Family Care   Assessment/Intervention/Outcome   Assessment Calm   Intervention Active listening;Discussed illness injury and it’s impact;Explored/Affirmed feelings, thoughts, concerns;Prayer (assurance of)/[place];Sustaining Presence/Ministry of presence   Outcome Engaged in conversation;Expressed feelings, needs, and concerns;Expressed Gratitude       
Spoke to [facility] pharmacist re positive blood culture, current antibiotics are appropriate  
Surgical Progress Note    POD:      Patient doing poorly  Vitals:    24 0700   BP: (!) 128/57   Pulse: 76   Resp: (!) 31   Temp:    SpO2: 100%      Temp (24hrs), Av.7 °F (37.1 °C), Min:98 °F (36.7 °C), Max:99.6 °F (37.6 °C)       Pain Control fair  No unusual nausea    Exam: wbc remains improved        Lungs:  No respiratory distress    Labs reviewed:  Labs:  WBC/Hgb/Hct/Plts:  12.4/10.7/33.3/74 (451)  BUN/Cr/glu/ALT/AST/amyl/lip:  59/3.4/--/--/--/--/-- (451)     Na/K/Cl/CO2:  140/3.8/104/22 (451)    I/O last 3 completed shifts:  In: 4719.3 [I.V.:3919; NG/GT:188; IV Piggyback:312.3]  Out: 2650 [Urine:650]    Assessment:    Patient Active Problem List   Diagnosis    Type 1 diabetes mellitus with diabetic mononeuropathy (HCC)    Essential hypertension    Mixed hyperlipidemia    Diabetic mononeuropathy associated with type 1 diabetes mellitus (HCC)    Lung nodule    Chronic low back pain    Chronic right shoulder pain    Bradycardia    Osteomyelitis (HCC)    Traumatic rhabdomyolysis (HCC)    Acute encephalopathy    Pyogenic arthritis of left knee joint (HCC)    Acute renal failure (HCC)    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  
Surgical Progress Note    POD:      Patient doing poorly  Vitals:    24 1237   BP: (!) 184/65   Pulse:    Resp:    Temp:    SpO2:       Temp (24hrs), Av.4 °F (37.4 °C), Min:99 °F (37.2 °C), Max:100 °F (37.8 °C)       Pain Control na  No unusual nausea    Exam: no change    Slight erythema dorsum right hand B UE pe    Lungs:  No respiratory distress    Labs reviewed:  Labs:  WBC/Hgb/Hct/Plts:  16.9/11.3/35.9/133 (332)  BUN/Cr/glu/ALT/AST/amyl/lip:  71/3.3/--/--/--/--/-- (332)     Na/K/Cl/CO2:  137/4.2/102/22 (332)    I/O last 3 completed shifts:  In: 3314.7 [I.V.:459; NG/GT:1794; IV Piggyback:461.6]  Out: 3241 [Urine:2000; Drains:10]    Assessment:    Patient Active Problem List   Diagnosis    Type 1 diabetes mellitus with diabetic mononeuropathy (HCC)    Essential hypertension    Mixed hyperlipidemia    Diabetic mononeuropathy associated with type 1 diabetes mellitus (HCC)    Lung nodule    Chronic low back pain    Chronic right shoulder pain    Bradycardia    Osteomyelitis (HCC)    Traumatic rhabdomyolysis (HCC)    Acute encephalopathy    Pyogenic arthritis of left knee joint (HCC)    Acute renal failure (HCC)    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  
Surgical Progress Note    POD:      Patient doing poorly  Vitals:    24 1330   BP: (!) 145/50   Pulse: 64   Resp: 28   Temp:    SpO2: 100%      Temp (24hrs), Av.6 °F (37 °C), Min:97.7 °F (36.5 °C), Max:99 °F (37.2 °C)       Pain Control sedated  No unusual nausea    Exam: no change significant        Lungs:  No respiratory distress    Labs reviewed:  Labs:  WBC/Hgb/Hct/Plts:  14.9/10.3/31.9/142 (443)  BUN/Cr/glu/ALT/AST/amyl/lip:  81/3.6/--/--/--/--/-- (443)     Na/K/Cl/CO2:  137/3.9/105/20 (443)    I/O last 3 completed shifts:  In: 3451.7 [I.V.:285.9; NG/GT:2944; IV Piggyback:221.8]  Out: 1925 [Urine:1850; Stool:75]    Assessment:    Patient Active Problem List   Diagnosis    Type 1 diabetes mellitus with diabetic mononeuropathy (HCC)    Essential hypertension    Mixed hyperlipidemia    Diabetic mononeuropathy associated with type 1 diabetes mellitus (HCC)    Lung nodule    Chronic low back pain    Chronic right shoulder pain    Bradycardia    Osteomyelitis (HCC)    Traumatic rhabdomyolysis (HCC)    Acute encephalopathy    Pyogenic arthritis of left knee joint (HCC)    Acute renal failure (HCC)    Atherosclerosis of native arteries of left leg with ulceration of other part of foot (HCC)    Severe sepsis (HCC)    Wound infection    Type 2 diabetes mellitus with left diabetic foot infection (HCC)    Ulcer of left foot with necrosis of muscle (HCC)    Leg edema    Thrombocytopenia (HCC)    Anemia    Acute respiratory failure (HCC)    Goals of care, counseling/discussion    DNR (do not resuscitate) discussion    ACP (advance care planning)    Palliative care encounter    Acute metabolic encephalopathy       Plan:  See my orders  Continue supportive care    Will sign off    Fu 2 weeks  Reji Duenas MD MD  2024 2:25 PM  
Surgical Progress Note    POD:      Patient doing poorly  Vitals:    24 1753   BP:    Pulse:    Resp: (!) 33   Temp:    SpO2:       Temp (24hrs), Av.5 °F (37.5 °C), Min:99 °F (37.2 °C), Max:100.1 °F (37.8 °C)       Pain Control sedated intubated  No unusual nausea    Exam: knee no effusion drain dc'd        Lungs:  No respiratory distress    Labs reviewed:  Labs:  WBC/Hgb/Hct/Plts:  14.8/10.9/34.1/103 (350)  BUN/Cr/glu/ALT/AST/amyl/lip:  78/4.0/--/--/--/--/-- (350)     Na/K/Cl/CO2:  143/3.9/109/21 (350)    I/O last 3 completed shifts:  In: 4424.4 [I.V.:3683.4; NG/GT:408; IV Piggyback:333]  Out: 1540 [Urine:1500; Drains:40]    Assessment:    Patient Active Problem List   Diagnosis    Type 1 diabetes mellitus with diabetic mononeuropathy (HCC)    Essential hypertension    Mixed hyperlipidemia    Diabetic mononeuropathy associated with type 1 diabetes mellitus (HCC)    Lung nodule    Chronic low back pain    Chronic right shoulder pain    Bradycardia    Osteomyelitis (HCC)    Traumatic rhabdomyolysis (HCC)    Acute encephalopathy    Pyogenic arthritis of left knee joint (HCC)    Acute renal failure (HCC)    Atherosclerosis of native arteries of left leg with ulceration of other part of foot (HCC)    Severe sepsis (HCC)    Wound infection    Type 2 diabetes mellitus with left diabetic foot infection (HCC)    Ulcer of left foot with necrosis of muscle (HCC)    Leg edema    Thrombocytopenia (HCC)    Anemia    Acute respiratory failure (HCC)    Goals of care, counseling/discussion    DNR (do not resuscitate) discussion    ACP (advance care planning)    Palliative care encounter    Acute metabolic encephalopathy       Plan:  See my orders  Knee infection appears improving           Reji Duenas MD MD  2024 6:02 PM  
Surgical Progress Note    POD: 1    Patient doing poorly  Vitals:    24 0815   BP:    Pulse: 84   Resp: 30   Temp:    SpO2: 100%      Temp (24hrs), Av.3 °F (37.4 °C), Min:97.5 °F (36.4 °C), Max:101.4 °F (38.6 °C)       Pain Control na  No unusual nausea    Exam: per nursing stable  Drainage sero hemorrhagic no puss    WBC SED Rate CRP all minimally improved        Lungs:  No respiratory distress    Labs reviewed:  Labs:  WBC/Hgb/Hct/Plts:  13.3/10.8/33.2/65 (704)  BUN/Cr/glu/ALT/AST/amyl/lip:  70/3.9/--/--/--/--/-- (704)  PT/INR/PTT:  16.3/1.3/-- ( 1120)  Na/K/Cl/CO2:  140/3.4/104/22 (704)    I/O last 3 completed shifts:  In: 3468.5 [I.V.:2670.6; IV Piggyback:198]  Out: 1105 [Urine:450; Emesis/NG output:25; Drains:30]    Assessment:    Patient Active Problem List   Diagnosis    Type 1 diabetes mellitus with diabetic mononeuropathy (HCC)    Essential hypertension    Mixed hyperlipidemia    Diabetic mononeuropathy associated with type 1 diabetes mellitus (HCC)    Lung nodule    Chronic low back pain    Chronic right shoulder pain    Bradycardia    Osteomyelitis (HCC)    Traumatic rhabdomyolysis (HCC)    Acute encephalopathy    Pyogenic arthritis of left knee joint (HCC)       Plan:  See my orders    Reji Duenas MD MD  2024 9:55 AM  
Surgical Progress Note    POD: 1    Patient doing poorly  Vitals:    24 1625   BP: (!) 126/59   Pulse: 77   Resp: 30   Temp:    SpO2:       Temp (24hrs), Av.2 °F (37.3 °C), Min:98 °F (36.7 °C), Max:100.4 °F (38 °C)       Pain Control good  No unusual nausea    Exam: drain sero hemorrhagic             Lungs:  No respiratory distress    Labs reviewed:  Labs:  WBC/Hgb/Hct/Plts:  11.5/10.4/32.6/60 (337)  BUN/Cr/glu/ALT/AST/amyl/lip:  79/4.3/--/--/--/--/-- (337)     Na/K/Cl/CO2:  139/3.3/104/21 (337)    I/O last 3 completed shifts:  In: 2684.2 [I.V.:2334.5; IV Piggyback:349.7]  Out: 795 [Urine:750; Drains:45]    Assessment:    Patient Active Problem List   Diagnosis    Type 1 diabetes mellitus with diabetic mononeuropathy (HCC)    Essential hypertension    Mixed hyperlipidemia    Diabetic mononeuropathy associated with type 1 diabetes mellitus (HCC)    Lung nodule    Chronic low back pain    Chronic right shoulder pain    Bradycardia    Osteomyelitis (HCC)    Traumatic rhabdomyolysis (HCC)    Acute encephalopathy    Pyogenic arthritis of left knee joint (HCC)    Acute renal failure (HCC)    Atherosclerosis of native arteries of left leg with ulceration of other part of foot (HCC)    Severe sepsis (HCC)    Wound infection    Type 2 diabetes mellitus with left diabetic foot infection (HCC)    Ulcer of left foot with necrosis of muscle (HCC)    Leg edema    Thrombocytopenia (HCC)    Anemia    Acute respiratory failure (HCC)    Goals of care, counseling/discussion    DNR (do not resuscitate) discussion    ACP (advance care planning)    Palliative care encounter       Plan:  See my orders    Wbc and inflammatory markers decreased    Reji Duenas MD MD  2024 6:37 PM  
[facility]   OCCUPATIONAL THERAPY MISSED TREATMENT NOTE   INPATIENT   Date: 24  Patient Name: Ramon Roger       Room:   MRN: [id number]   Account #: [id number]    : 1960  (63 y.o.)  Gender: male                 REASON FOR MISSED TREATMENT:  Pt on ventilator, will continue to follow and complete OT evaluation when medically appropriate.      Electronically signed by Veronica Carreon OT on 24 at 11:46 AM EDT    
The patient was extubated this an at 11:10am and he is on 3 liters of oxygen per nasal cannula. He appears comfortable. Per the family he was just given some ativan.   I talk to the family about hospice care as he may have survival time. We talk about hospice options and inpatient options as well and family decided to go with [facility] as they would prefer the [place] location for inpatient.     I call [facility] and Ellen ORELLANA will be here within the hour to meet with family.     I update family and ex-wife Raegan.     I update Arcelia BURLESON     The goal is to be able to transfer him directly from the ICU bed to inpatient hospice.         ..[facility]  Suri Sellers RN,CHPN   [facility] [place]: [phone]  [facility]: [phone]  [facility]: [phone]     
The potassium/magnesium sliding scale has been automatically discontinued per Pharmacy and Therapeutic Committee approved policy because the patient has decreased renal function (CrCl<30 ml/min).  The patient's current K/Mag levels are currently:    Recent Labs     06/21/24  1120 06/22/24  0425   K 4.5 4.8   MG 2.2  --        Estimated Creatinine Clearance: 21 mL/min (A) (based on SCr of 4.2 mg/dL (H)).  For patients with decreased renal function (below 30ml/min) needing potassium/magnesium supplementation, please order individual bolus doses with appropriate monitoring.      Please contact the inpatient pharmacy at [phone] with any concerns.  Thank you.    Katarina Yañez RPH  6/22/2024  7:34 AM    
Vancomycin Dosing by Pharmacy - Initial Note   TODAY'S DATE:  6/21/2024  Patient name, age:  Ramon Roger, 63 y.o.    Indication:  : bone/joint, sepsis, SSTI  Additional antimicrobials:  cefepime    Allergies:  Patient has no known allergies.   Actual Weight:    Wt Readings from Last 1 Encounters:   06/21/24 99.8 kg (220 lb)     Labs/Ancillary Data  Estimated Creatinine Clearance: 26 mL/min (A) (based on SCr of 3.4 mg/dL (H)).  Recent Labs     06/21/24  1120 06/21/24 2018   CREATININE 2.8* 3.4*   BUN 78*  --    WBC 21.9*  --      Procalcitonin   Date Value Ref Range Status   06/21/2024 65.82 (H) <0.09 ng/mL Final     Comment:           Suspected Sepsis:  <0.50 ng/mL     Low likelihood of sepsis.  0.50-2.00 ng/mL     Increased likelihood of sepsis. Antibiotics encouraged.  >2.00 ng/mL     High risk of sepsis/shock. Antibiotics strongly encouraged.        Suspected Lower Resp Tract Infections:  <0.24 ng/mL     Low likelihood of bacterial infection.  >0.24 ng/mL     Increased likelihood of bacterial infection. Antibiotics encouraged.        With successful antibiotic therapy, PCT levels should decrease rapidly. (Half-life of 24 to   36 hours.)        Procalcitonin values from samples collected within the first 6 hours of systemic infection   may still be low. Retesting may be indicated.  Values from day 1 and day 4 can be entered into the Change in Procalcitonin Calculator   ([email or web address]) to determine the patient's Mortality Risk Prognosis        In healthy neonates, plasma Procalcitonin (PCT) concentrations increase gradually after   birth, reaching peak values at about 24 hours of age then decrease to normal values below   0.5 ng/mL by 48-72 hours of age.         Intake/Output Summary (Last 24 hours) at 6/21/2024 2117  Last data filed at 6/21/2024 1320  Gross per 24 hour   Intake --   Output 250 ml   Net -250 ml     Temp: 102.9 F    Recent vancomycin administrations                     
Vascular Surgery    Arterial studies reviewed. Normal JACKIE in the left leg. Still intubated. WBC 12 today.    At this point can proceed with podiatry intervention. No further vascular intervention required. Please reconsult if patient ends up requiring a below or above knee amputation. Will sign off at this time. Discussed with patient's son over the phone.    Electronically signed by NIDHI LOZANO MD on 6/25/2024 at 8:41 AM   
Versed restarted at 2  
Writer and ER tech transport patient ro ICU 2014 from MRI, medicine resident at bedside. Patient  to room 2008 due to concern about pt protecting airway. Dr. Rae consulted and messaged, ABGs drawn.   
Writer and RRT remained at bedside during wean trial.  Patient became tachypneic and labored, RR in the 40s, . Patient not following commands RRT placed patient back on full vent support at this time.   
Writer and RT at bedside, patient RR 36-38 at this time, labored breathing. Sedation restarted   
Writer notified PARISA Pruett of /77 despite scheduled lopressor. NP states give morning dose of hydralazine now.   
Writer reached out to Dr. Huerta regarding MRI brain with and without contrast, MD is not okay with patient receiving contrast at this time. Medicine residents notified  
Writer reached out to PARISA Pruett regarding blood glucose of 400. NP orders 5 units Humalog one time. See EMAR.  
Writer reaches out to NP S Waterhouse regarding elevated BP. NP places order for 1x dose labetalol see EMAR.  
Writer spoke with Dr. Justice regarding sedation vacation. Patient not following commands at this time. Dr. Justice stated its okay to restart sedation. Versed started at 50% of previous rate.   
Writer spoke with Simona in OR regarding Dr. Duenas rounding on patient. Family wants to be present for rounding, so that they can ask Dr. Duenas a few questions. Simona stated that she will relay this message to Dr. Duenas.  
Writer to MRI with patient after bedside report in ED. Patient not following commands, opens eyes for short time to speech, does not track. RR in 40s, on 3L NC   
(HCC)    Acute renal failure (HCC)    Atherosclerosis of native arteries of left leg with ulceration of other part of foot (HCC)    Severe sepsis (HCC)    Wound infection    Type 2 diabetes mellitus with left diabetic foot infection (HCC)    Ulcer of left foot with necrosis of muscle (HCC)    Leg edema    Thrombocytopenia (HCC)    Anemia    Acute respiratory failure (HCC)    Goals of care, counseling/discussion    DNR (do not resuscitate) discussion    ACP (advance care planning)    Palliative care encounter    Acute metabolic encephalopathy       Palliative Interaction:The patient remains intubated and was on a sedation vacation and did not not follow commands. The Versed drip was restarted at 50% of previous rate.     Per podiatry updates they believe that believe that the foot could improve without amputation. Orthopedics is following for left septic knee joint.     The patient continues dialysis and the present plan of care. I will reach out to gaye Sood for support as [facility].     ADDENDUM:   I call the patient's son Barrie and he is at the bedside. I go to meet him and Dr. Duenas is there with an update about his left knee. He is encouraged that it looks better, and pleased with the response to treatment and as well he removes the drain. He does explain other factors of concern, his creat is 4.0 and his WBC's are over 14. He states that it will be time and as well to continue to investigate other sources of infection. He states that there are many organs involved and many factors as well.     I introduce myself to son Barrie and I start to talk about all that is going on and the worry of the ventilator and if we can wean him off. Dr. Saleh then comes in and talks about the plan for the left foot amputation. They decided after all to proceed with transmetatarsal amputation. She states that it is not an acute but a chronic issue and that was the best plan of care and that 
04:43 AM    HGB 10.3 06/28/2024 04:43 AM    HCT 31.9 06/28/2024 04:43 AM     SED RATE:   Lab Results   Component Value Date    SEDRATE 80 (H) 06/28/2024    SEDRATE 73 (H) 06/27/2024     CRP:   Lab Results   Component Value Date/Time    CRP 77.4 06/28/2024 04:43 AM    CRP 99.0 06/27/2024 03:32 AM     PT/INR:    Lab Results   Component Value Date/Time    PROTIME 16.3 06/21/2024 11:20 AM    INR 1.3 06/21/2024 11:20 AM     HgBA1c:    Lab Results   Component Value Date/Time    LABA1C 7.8 06/22/2024 04:25 AM       Flowsheet Documentation    Wound 06/26/24 Sacrum Medial stage two preassure injury on sacrum (Active)   Wound Etiology Pressure Stage 2 06/28/24 1200   Dressing Status Clean;Dry;Intact 06/28/24 1200   Wound Cleansed Not Cleansed 06/28/24 1200   Dressing/Treatment Foam 06/28/24 1200   Dressing Change Due 06/29/24 06/28/24 1200   Wound Assessment Pink/red;Non-blanchable erythema 06/28/24 1200   Drainage Amount None (dry) 06/28/24 1200   Drainage Description Serous 06/28/24 1200   Odor None 06/28/24 1200   Veronica-wound Assessment Blanchable erythema 06/28/24 1200   Margins Attached edges 06/28/24 1200   Wound Thickness Description not for Pressure Injury Partial thickness 06/28/24 1200   Number of days: 1       Wound Foot Left;Medial (Active)   Wound Image   06/28/24 1415   Wound Etiology Diabetic 06/28/24 1415   Dressing Status New dressing applied;Old drainage noted 06/28/24 1415   Wound Cleansed Cleansed with saline 06/28/24 1415   Dressing/Treatment Hydrofiber Ag;ABD;Roll gauze 06/28/24 1415   Offloading for Diabetic Foot Ulcers Offloading boot 06/28/24 1415   Dressing Change Due 06/30/24 06/28/24 1415   Wound Length (cm) 8 cm 06/28/24 1415   Wound Width (cm) 4 cm 06/28/24 1415   Wound Depth (cm) 0.01 cm 06/28/24 1415   Wound Surface Area (cm^2) 32 cm^2 06/28/24 1415   Wound Volume (cm^3) 0.32 cm^3 06/28/24 1415   Wound Assessment Pink/red;Granulation tissue;Slough;Eschar less than 20% 06/28/24 1415   Drainage 
6/27/2024 0800  Gross per 24 hour   Intake 2460.36 ml   Output 700 ml   Net 1760.36 ml     Temp: 99.8      Recent vancomycin administrations        No vancomycin IV orders with administrations found.            Orders not given:            vancomycin (VANCOCIN) 1,750 mg in sodium chloride 0.9 % 500 mL IVPB    vancomycin (VANCOCIN) intermittent dosing (placeholder)                  Culture Date / Source  /  Results  See micro     PLAN    Vancomycin level ordered for: 06/28 0600 .  2.   ONLY for suspected pneumonia or COPD: MRSA Nasal Swab: N/A. Non-respiratory infection.      LD given 06/21 resulting in a supratherapeutic level   Random level on 06/26 8.3  Giving ~15 mg/kg  1 dose  Check level tomorrow am    Stable HD/PD patients do not require regular renal function monitoring.      Intermittent Hemodialysis (HD)*do not use Bayesian software for dosing  Empiric Dosing Regimen:  LD = 25 mg/kg (MAX 2,000mg dose), give dialysis, then         MD  = 10-15 mg/kg after HD sessions (MAX 2,000mg per dose)  Timing and Frequency of Concentration Monitoring  Critically ill à pre-HD concentrations after LD  Stable/stable dialysis à pre-HD concentration after 1st or 2nd MD on dialysis 3x/week  Pre-dialysis level Invasive MRSA Infection or Sepsis Non-Invasive Infection or Non-MRSA Infection   ? 25 mg/L Hold vancomycin dose, ? subsequent dose by 250 mg Hold vancomycin dose, ? subsequent dose by 250-500 mg   21-24 mg/L Continue current dose Decrease dose by 250 mg   15-20 mg/L Increase dose by 250 mg Continue current dose   ? 14 mg/L Increase dose by 250-500 mg Increase dose by 250 mg   Note: Pre-HD concentration monitoring is preferred, if concentrations are drawn after HD, the level must be collected no sooner than 2-4 hours after the end of the session to allow for maximum redistribution and plasma rebound.      Thank you for the consult.  Pharmacy will continue to follow.  Katarina Yañez RPH, RPnash/PharmD  6/27/2024  2:20 
(24hrs), Av , Min:127 , Max:172   ; Diastolic (24hrs), Av, Min:46, Max:74  PULSE OXIMETRY RANGE: SpO2  Av.9 %  Min: 98 %  Max: 100 %  24HR INTAKE/OUTPUT:    Intake/Output Summary (Last 24 hours) at 2024 1207  Last data filed at 2024 1000  Gross per 24 hour   Intake 3446.5 ml   Output 3875 ml   Net -428.5 ml     Constitutional:  Intubated and on ventilator support.    Skin: Skin color, texture, turgor normal. No rashes or lesions    Head: Normocephalic, without obvious abnormality, atraumatic     Neck:   Supple with no JVD.    Cardiovascular/Edema:  Tachycardia    Respiratory: Lungs: clear to auscultation bilaterally    Abdomen: soft, non-tender; bowel sounds normal; no masses,  no organomegaly    Back: symmetric, no curvature. ROM normal. No CVA tenderness.    Extremities: venous stasis dermatitis noted and left leg ulcer; s/p right below-knee amputation    Neuro:   sedated and on ventilator support.    CBC:   Recent Labs     24  0332 24  0443 24  0349   WBC 16.9* 14.9* 13.2*   HGB 11.3* 10.3* 10.1*   * 142* 175     BMP:    Recent Labs     24  0332 24  0443 24  0349    137 135   K 4.2 3.9 3.9    105 102   CO2 22 20 25   BUN 71* 81* 58*   CREATININE 3.3* 3.6* 2.6*   GLUCOSE 400* 289* 259*     Lab Results   Component Value Date/Time    NITRU NEGATIVE 2024 06:12 PM    COLORU Dark Yellow 2024 06:12 PM    PHUR 6.0 2024 06:12 PM    PHUR 5.5 2020 12:53 AM    WBCUA 3 to 5 2024 06:12 PM    RBCUA 21 TO 50 2024 06:12 PM    MUCUS NOT REPORTED 2020 12:53 AM    TRICHOMONAS NOT REPORTED 2020 12:53 AM    YEAST NOT REPORTED 2020 12:53 AM    BACTERIA MODERATE 2024 06:12 PM    LEUKOCYTESUR TRACE 2024 06:12 PM    UROBILINOGEN Normal 2024 06:12 PM    BILIRUBINUR NEGATIVE 2024 06:12 PM    GLUCOSEU MOD 2024 06:12 PM    KETUA NEGATIVE 2024 06:12 PM    AMORPHOUS NOT REPORTED 
Axillary 73 (!) 31 100 %   06/29/24 1157 -- -- -- 73 (!) 31 100 %   06/29/24 1100 (!) 156/58 -- -- 79 28 100 %   06/29/24 1045 (!) 156/58 -- -- 81 28 100 %   06/29/24 1033 -- -- -- -- (!) 31 --   06/29/24 1000 (!) 172/63 -- -- 82 29 100 %   06/29/24 0900 (!) 151/52 -- -- 70 30 100 %   06/29/24 0800 (!) 162/54 100.1 °F (37.8 °C) Axillary 77 28 100 %   06/29/24 0757 -- -- -- 77 29 100 %   06/29/24 0739 (!) 171/58 -- -- 75 -- --   06/29/24 0700 (!) 161/56 -- -- 77 -- 100 %               I have personally reviewed the past medical history, past surgical history, medications, social history, and family history, and I haveupdated the database accordingly.      Allergies:   Patient has no known allergies.     Review of Systems:     Review of Systems  Intubated, unable to provide  Physical Examination :       Physical Exam  Constitutional:       Comments: Intubated, sedated   HENT:      Head: Normocephalic and atraumatic.      Right Ear: External ear normal.      Left Ear: External ear normal.   Eyes:      General: No scleral icterus.     Conjunctiva/sclera: Conjunctivae normal.   Cardiovascular:      Rate and Rhythm: Normal rate.      Heart sounds: Normal heart sounds. No murmur heard.  Pulmonary:      Breath sounds: Normal breath sounds. No wheezing or rales.   Abdominal:      General: There is no distension.      Palpations: Abdomen is soft.   Musculoskeletal:      Comments: Right BKA   Left foot dressing  Left knee mild swelling and erythema, no fluctuation or crepitus.   Skin:     Coloration: Skin is not jaundiced.      Findings: No bruising.      Comments: Right arm superficial wound with surrounding erythema, warmth and swelling extending to the forearm, no fluctuation or crepitance.                Past Medical History:     Past Medical History:   Diagnosis Date    Adenomatous polyp of colon     ALT (SGPT) level raised     Asbestos exposure     Asthma     BPH (benign prostatic hyperplasia)     Diabetes mellitus (HCC) 
Diastolic (24hrs), Av, Min:50, Max:81  PULSE OXIMETRY RANGE: SpO2  Av %  Min: 100 %  Max: 100 %  24HR INTAKE/OUTPUT:    Intake/Output Summary (Last 24 hours) at 2024 1210  Last data filed at 2024 0800  Gross per 24 hour   Intake 3060.36 ml   Output 1931 ml   Net 1129.36 ml     Constitutional:  Intubated and on ventilator support.    Skin: Skin color, texture, turgor normal. No rashes or lesions    Head: Normocephalic, without obvious abnormality, atraumatic     Neck:   Supple with no JVD.    Cardiovascular/Edema:  Tachycardia    Respiratory: Lungs: clear to auscultation bilaterally    Abdomen: soft, non-tender; bowel sounds normal; no masses,  no organomegaly    Back: symmetric, no curvature. ROM normal. No CVA tenderness.    Extremities: venous stasis dermatitis noted and left leg ulcer; s/p right below-knee amputation    Neuro:   sedated and on ventilator support.    CBC:   Recent Labs     24  0451 24  0350 24  0332   WBC 12.4* 14.8* 16.9*   HGB 10.7* 10.9* 11.3*   PLT 74* 103* 133*     BMP:    Recent Labs     24  0451 24  0350 24  0332    143 137   K 3.8 3.9 4.2    109* 102   CO2 22 21 22   BUN 59* 78* 71*   CREATININE 3.4* 4.0* 3.3*   GLUCOSE 138* 328* 400*     Lab Results   Component Value Date/Time    NITRU POSITIVE 2024 01:17 PM    COLORU Dark Yellow 2024 01:17 PM    PHUR 5.5 2024 01:17 PM    PHUR 5.5 2020 12:53 AM    WBCUA 0 TO 2 2024 01:17 PM    RBCUA 10 TO 20 2024 01:17 PM    MUCUS NOT REPORTED 2020 12:53 AM    TRICHOMONAS NOT REPORTED 2020 12:53 AM    YEAST NOT REPORTED 2020 12:53 AM    BACTERIA FEW 2024 01:17 PM    LEUKOCYTESUR SMALL 2024 01:17 PM    UROBILINOGEN Normal 2024 01:17 PM    BILIRUBINUR NEGATIVE  Verified by ictotest. 2024 01:17 PM    GLUCOSEU MOD 2024 01:17 PM    KETUA NEGATIVE 2024 01:17 PM    AMORPHOUS NOT REPORTED 2020 12:53 AM 
Germaine Willis MD   sertraline (ZOLOFT) 100 MG tablet Take 150 mg by mouth daily 1.5 tablets 3/9/20   Germaine Willis MD   simvastatin (ZOCOR) 80 MG tablet Take 80 mg by mouth daily     Germaine Willis MD     Current Facility-Administered Medications   Medication Dose Route Frequency Provider Last Rate Last Admin    morphine injection 4 mg  4 mg IntraVENous Q1H PRN Dilcia Solano APRN - CNP   4 mg at 07/01/24 1105    LORazepam (ATIVAN) injection 2 mg  2 mg IntraVENous Q2H PRN Dilcia Solano APRN - QUINCY        glycopyrrolate (ROBINUL) injection 0.1 mg  0.1 mg IntraVENous Q6H PRN Dilcia Solano APRN - CNP        0.9 % sodium chloride infusion   IntraVENous Continuous Dorys Bran MD   Stopped at 06/27/24 1715    anticoagulant sodium citrate 4 % injection 1.2 mL  1.2 mL IntraCATHeter PRN Aleksander Huerta MD   1.2 mL at 06/26/24 1341    anticoagulant sodium citrate 4 % injection 1.2 mL  1.2 mL IntraCATHeter PRN Aleksander Huerta MD   1.2 mL at 06/26/24 1340    bisacodyl (DULCOLAX) suppository 10 mg  10 mg Rectal Daily PRN Laurence Pruett APRN - NP        heparin (PF) 100 UNIT/ML injection 500 Units  500 Units IntraCATHeter PRN Reji Duenas MD        anticoagulant sodium citrate 4 % injection 1.2 mL  1.2 mL IntraCATHeter PRN Karishma Pelayo MD   1.2 mL at 06/24/24 1655    anticoagulant sodium citrate 4 % injection 1.2 mL  1.2 mL IntraCATHeter PRN Karishma Pelayo MD   1.2 mL at 06/24/24 1655    sodium chloride flush 0.9 % injection 5-40 mL  5-40 mL IntraVENous PRN Reji Duenas MD        0.9 % sodium chloride infusion   IntraVENous PRN Reji Duenas MD        sodium chloride flush 0.9 % injection 5-40 mL  5-40 mL IntraVENous PRN Reji Duenas MD        0.9 % sodium chloride infusion   IntraVENous PRN Reji Duenas MD        ondansetron (ZOFRAN-ODT) disintegrating tablet 4 mg  4 mg Oral Q8H PRN Reji Duenas MD        Or    ondansetron (ZOFRAN) injection 4 
Min: 10  Max: 34  PULSE RANGE: Pulse  Av.1  Min: 53  Max: 82  BLOOD PRESSURE RANGE:  Systolic (24hrs), Av , Min:120 , Max:190   ; Diastolic (24hrs), Av, Min:46, Max:88  PULSE OXIMETRY RANGE: SpO2  Av.8 %  Min: 98 %  Max: 100 %  24HR INTAKE/OUTPUT:    Intake/Output Summary (Last 24 hours) at 2024 1334  Last data filed at 2024 1200  Gross per 24 hour   Intake 2067.82 ml   Output 2055 ml   Net 12.82 ml     Constitutional:  Intubated and on ventilator support.    Skin: Skin color, texture, turgor normal. No rashes or lesions    Head: Normocephalic, without obvious abnormality, atraumatic     Neck:   Supple with no JVD.    Cardiovascular/Edema:  Tachycardia    Respiratory: Lungs: clear to auscultation bilaterally    Abdomen: soft, non-tender; bowel sounds normal; no masses,  no organomegaly    Back: symmetric, no curvature. ROM normal. No CVA tenderness.    Extremities: venous stasis dermatitis noted and left leg ulcer; s/p right below-knee amputation    Neuro:   sedated and on ventilator support.    CBC:   Recent Labs     24  0350 24  0332 24  0443   WBC 14.8* 16.9* 14.9*   HGB 10.9* 11.3* 10.3*   * 133* 142*     BMP:    Recent Labs     24  0350 24  0332 24  0443    137 137   K 3.9 4.2 3.9   * 102 105   CO2 21 22 20   BUN 78* 71* 81*   CREATININE 4.0* 3.3* 3.6*   GLUCOSE 328* 400* 289*     Lab Results   Component Value Date/Time    NITRU NEGATIVE 2024 06:12 PM    COLORU Dark Yellow 2024 06:12 PM    PHUR 6.0 2024 06:12 PM    PHUR 5.5 2020 12:53 AM    WBCUA 3 to 5 2024 06:12 PM    RBCUA 21 TO 50 2024 06:12 PM    MUCUS NOT REPORTED 2020 12:53 AM    TRICHOMONAS NOT REPORTED 2020 12:53 AM    YEAST NOT REPORTED 2020 12:53 AM    BACTERIA MODERATE 2024 06:12 PM    LEUKOCYTESUR TRACE 2024 06:12 PM    UROBILINOGEN Normal 2024 06:12 PM    BILIRUBINUR NEGATIVE 2024 06:12 
(APRESOLINE) tablet 100 mg, 100 mg, Oral, TID, Dorys Bran MD    sertraline (ZOLOFT) tablet 150 mg, 150 mg, Oral, Daily, Reji Duenas MD, 150 mg at 06/23/24 0850    [Held by provider] atorvastatin (LIPITOR) tablet 40 mg, 40 mg, Oral, Daily, Dorys Bran MD    [Held by provider] pregabalin (LYRICA) capsule 150 mg, 150 mg, Oral, Daily, Dorys Bran MD    Lab Results:     Lab Results   Component Value Date    WBC 13.3 (H) 06/23/2024    HGB 10.8 (L) 06/23/2024    HCT 33.2 (L) 06/23/2024    MCV 84.1 06/23/2024    PLT 65 (L) 06/23/2024     Lab Results   Component Value Date    CALCIUM 7.3 (L) 06/23/2024     06/23/2024    K 3.4 (L) 06/23/2024    CO2 22 06/23/2024     06/23/2024    BUN 70 (H) 06/23/2024    CREATININE 3.9 (H) 06/23/2024     No components found for: \"ABGSAMPLETYP\", \"ABGBODYTEMP\", \"ABGPHCORRFOR\", \"[id number]\", \"ABGPHCORRFOOR\", \"ABGPH\", \"ABGPCO2\", \"ABGPO2\", \"ABGBASEEXCES\", \"ABGBASEDEFIC\", \"ABGHCO3\", \"KMPF2SVT\", \"ABGENDTIDALC\", \"ABGALLENSTES\", \"ABGSPO2\", \"ABGSAMEPLESIT\", \"[id number]\", \"ABGOXYGENSOU\"  Lab Results   Component Value Date    INR 1.3 06/21/2024    PROTIME 16.3 (H) 06/21/2024       Radiology:   [unfilled]  My reading of film: No new imaging.    ASSESSMENT:     Acute hypercapnic respiratory failure requiring emergent intubation June 21 shortly after arrival from ER-related to sepsis with high minute ventilation demands on poor substrate-currently still with ventilation 16 L/min  Acute kidney injury superimposed on chronic kidney disease-will likely initiate dialysis during this admission via right jugular temporary dialysis catheter placed June 22  Type 1 diabetes mellitus with poor control-most recent A1c I could find was from last year 10.5>> 7.8 now  Peripheral vascular disease  Strep pyogenes bacteremia likely from the left leg, currently on antibiotics, status post arthroscopy left knee  Septic left knee-status post percutaneous drainage status post arthroscopy>> 
Heart sounds: Normal heart sounds. No murmur heard.  Pulmonary:      Breath sounds: Normal breath sounds. No wheezing or rales.   Abdominal:      General: There is no distension.      Palpations: Abdomen is soft.   Musculoskeletal:      Comments: Right BKA   Left foot dressing  Left knee mild swelling and erythema, no fluctuation or crepitus.   Skin:     Coloration: Skin is not jaundiced.      Findings: No bruising.      Comments: Right arm superficial wound with surrounding erythema, warmth and swelling extending to the forearm, no fluctuation or crepitance.                Past Medical History:     Past Medical History:   Diagnosis Date    Adenomatous polyp of colon     ALT (SGPT) level raised     Asbestos exposure     Asthma     BPH (benign prostatic hyperplasia)     Diabetes mellitus (HCC)     ED (erectile dysfunction)     Esophageal reflux     Hearing loss     History of depression     Lumbar radiculopathy     Neuropathy     Osteoarthritis     Tinnitus of left ear        Past Surgical  History:     Past Surgical History:   Procedure Laterality Date    APPENDECTOMY      BACK SURGERY      ANT SPINAL DISKECTOMY    COLONOSCOPY  05/06/2011    POLYPECTOMY-DR LORENZO MIMS    IR NONTUNNELED VASCULAR CATHETER  6/27/2024    IR NONTUNNELED VASCULAR CATHETER 6/27/2024 [facility] SPECIAL PROCEDURES    KNEE ARTHROSCOPY Left 6/22/2024    KNEE ARTHROSCOPY I/D performed by Reji Duenas MD at [facility] OR    NASAL SEPTUM SURGERY      RETROPHYARYNGEAL ABCESS INCISION/DRAIN      SINUS SURGERY         Medications:      cloNIDine  0.2 mg Oral TID    insulin glargine  22 Units SubCUTAneous BID    aspirin  81 mg Oral Daily    DAPTOmycin (CUBICIN) 500 mg in sodium chloride 0.9 % 50 mL IVPB  6 mg/kg (Adjusted) IntraVENous Once per day on Mon Wed    [START ON 7/5/2024] DAPTOmycin (CUBICIN) 750 mg in sodium chloride 0.9 % 50 mL IVPB  9 mg/kg (Adjusted) IntraVENous Once per day on Fri    cefepime  1,000 mg IntraVENous Q24H    heparin (porcine)  
REPORTED 05/11/2020 12:53 AM    BACTERIA MODERATE 06/27/2024 06:12 PM    LEUKOCYTESUR TRACE 06/27/2024 06:12 PM    UROBILINOGEN Normal 06/27/2024 06:12 PM    BILIRUBINUR NEGATIVE 06/27/2024 06:12 PM    GLUCOSEU MOD 06/27/2024 06:12 PM    KETUA NEGATIVE 06/27/2024 06:12 PM    AMORPHOUS NOT REPORTED 05/11/2020 12:53 AM     IMPRESSION/RECOMMENDATIONS:      1.  Acute kidney injury - secondary to toxic acute tubular necrosis complicating rhabdomyolysis.  Baseline serum creatinine was 1.06 mg/dL on 5/31/2024.  BUN/creatinine today is 59/3.4 mg/dL---> Scr 4.0 mg/dl    Monitor urine output closely.  Avoid nephrotoxic agents    Nephrotic range Proteinuria, UPC 8 g  KAYLIE,ANCA C3 C4 normal  SPEP/UPEP pending  Immunofixation ---->Two zones of restrictionare present in the gammaglobulin region. Confirmed by immunotyping to be monoclonal IgG Lambda (0.46 g/dl) and monoclonal IgG Kappa (0.26 g/dl).     2.  Hypokalemia - secondary to poor intake.  Resolved.    3.  Systemic hypertension -systolic blood pressure in 150s and 160s.hydralazine 100 mg tid metoprolol,Amlodipine 10 mg daily.clonidine    4.  DM type 2 IDDM    5. Bacteremia-positive blood cx 6/22/24    Plan    Patient scr continue to rise in between dialysis.making more urine,monitor for renal recovery.  Avoid contrast or any other nephrotoxic agents  Patient remains dialysis dependent.  Dialysis catheter changed,  S/p  temporary dialysis catheter exchange as patient had   Next hemodialysis Monday  Continue amlodipine, clonidine hydralazine, metoprolol  If blood pressure remains high can add Cardura  Oncology following for presence monoclonal protein on immunofixation.      Prognosis is guarded.    Aleksander Huerta MD   Attending Nephrologist  6/30/2024 12:53 PM            
per day    sodium chloride  500 mL IntraVENous Once    insulin lispro  0-4 Units SubCUTAneous Q4H    [Held by provider] amLODIPine  5 mg Oral Daily    [Held by provider] hydrALAZINE  100 mg Oral TID    sertraline  150 mg Oral Daily    [Held by provider] atorvastatin  40 mg Oral Daily    [Held by provider] pregabalin  150 mg Oral Daily       Social History:     Social History     Socioeconomic History    Marital status:      Spouse name: Not on file    Number of children: Not on file    Years of education: Not on file    Highest education level: Not on file   Occupational History    Not on file   Tobacco Use    Smoking status: Former     Current packs/day: 0.00     Average packs/day: 0.3 packs/day for 14.0 years (3.5 ttl pk-yrs)     Types: Cigarettes     Start date: 3/12/1971     Quit date: 3/12/1985     Years since quittin.3    Smokeless tobacco: Never   Substance and Sexual Activity    Alcohol use: Not Currently    Drug use: Never    Sexual activity: Not on file   Other Topics Concern    Not on file   Social History Narrative    Not on file     Social Determinants of Health     Financial Resource Strain: Low Risk  (3/12/2020)    Overall Financial Resource Strain (CARDIA)     Difficulty of Paying Living Expenses: Not hard at all   Food Insecurity: No Food Insecurity (3/12/2020)    Hunger Vital Sign     Worried About Running Out of Food in the Last Year: Never true     Ran Out of Food in the Last Year: Never true   Transportation Needs: Not on file   Physical Activity: Not on file   Stress: Not on file   Social Connections: Not on file   Intimate Partner Violence: Not on file   Housing Stability: Not on file       Family History:     Family History   Problem Relation Age of Onset    Diabetes Mother     Heart Attack Mother     Diabetes Father     Cancer Father     Diabetes Sister     Heart Attack Sister       Medical Decision Making:   I have independently reviewed/ordered the following labs:    CBC with 
the last 72 hours.     Cultures       Radiology     Plain Films         Chest x-ray shows endotracheal tube slightly high, lines in good position some increased interstitial markings bilaterally      SYSTEM ASSESSMENT    Acute hypercapnic respiratory failure requiring emergent intubation June 21 shortly after arrival from ER-related to sepsis with high minute ventilation demands   Acute kidney injury superimposed on chronic kidney disease-and dialysis initiated via right jugular temporary dialysis catheter placed June 22  Type 1 diabetes mellitus with poor control-most recent A1c  from last year 10.5>> 7.8 now  Peripheral vascular disease; status post right BKA  Strep pyogenes bacteremia likely from the left leg, currently on antibiotics, status post arthroscopy left knee  Septic left knee-status post percutaneous drainage status post arthroscopy>> cultures from left knee positive for strep pyogenes group A  Rhabdomyolysis secondary to infection  History of tobacco-details unclear, no documented COPD  UTI-based on urinalysis urine culture shows no growth so far  Sepsis-severe-procalcitonin 65  Lactic acidosis-improved  Thrombocytopenia probably secondary to sepsis  Metabolic acidosis-combination of uremia lactic acidosis and ketosis, improved  Mild ketosis  Symptomatic hypoglycemia requiring glucose containing IV fluid infusion   Full code      Neuro   Continue daily sedation vacations  MRI not needed urgently per neurology which I agree with given multiple reasons for his encephalopathy  Continue Versed drip for sedation despite his tachypnea he appears to be fairly comfortable    Respiratory   Wean oxygen as tolerated. Keep O2 sat > 88%  Possible surgery today so will not wean  If no surgery planned, will attempt to wean  Advance endotracheal tube 2 cm  Hemodynamics     Blood pressure now more reasonable not as high  Lactic acid yesterday was normal  CRP trending downward unclear why we need to have daily 
(3/12/2020)    Overall Financial Resource Strain (CARDIA)     Difficulty of Paying Living Expenses: Not hard at all   Food Insecurity: No Food Insecurity (3/12/2020)    Hunger Vital Sign     Worried About Running Out of Food in the Last Year: Never true     Ran Out of Food in the Last Year: Never true   Transportation Needs: Not on file   Physical Activity: Not on file   Stress: Not on file   Social Connections: Not on file   Intimate Partner Violence: Not on file   Housing Stability: Not on file       Family History:     Family History   Problem Relation Age of Onset    Diabetes Mother     Heart Attack Mother     Diabetes Father     Cancer Father     Diabetes Sister     Heart Attack Sister       Medical Decision Making:   I have independently reviewed/ordered the following labs:    CBC with Differential:   Recent Labs     06/26/24  0350 06/27/24  0332   WBC 14.8* 16.9*   HGB 10.9* 11.3*   HCT 34.1* 35.9*   * 133*   LYMPHOPCT 13* 12*   MONOPCT 6 6   EOSPCT 1 1       BMP:  Recent Labs     06/26/24  0350 06/27/24  0332    137   K 3.9 4.2   * 102   CO2 21 22   BUN 78* 71*   CREATININE 4.0* 3.3*       Hepatic Function Panel:   No results for input(s): \"PROT\", \"LABALBU\", \"BILIDIR\", \"IBILI\", \"BILITOT\", \"ALKPHOS\", \"ALT\", \"AST\" in the last 72 hours.    No results for input(s): \"RPR\" in the last 72 hours.  No results for input(s): \"HIV\" in the last 72 hours.  No results for input(s): \"BC\" in the last 72 hours.  Lab Results   Component Value Date/Time    CREATININE 3.3 06/27/2024 03:32 AM    GLUCOSE 400 06/27/2024 03:32 AM       Detailed results:        Thank you for allowing us to participate in the care of this patient.Please call with questions.    This note is created with the assistance of a speech recognition program.  While intending to generate adocument that actually reflects the content of the visit, the document can still have some errors including those of syntax and sound a like substitutions 
Resource Strain (CARDIA)     Difficulty of Paying Living Expenses: Not hard at all   Food Insecurity: No Food Insecurity (3/12/2020)    Hunger Vital Sign     Worried About Running Out of Food in the Last Year: Never true     Ran Out of Food in the Last Year: Never true   Transportation Needs: Not on file   Physical Activity: Not on file   Stress: Not on file   Social Connections: Not on file   Intimate Partner Violence: Not on file   Housing Stability: Not on file       Family History:     Family History   Problem Relation Age of Onset    Diabetes Mother     Heart Attack Mother     Diabetes Father     Cancer Father     Diabetes Sister     Heart Attack Sister       Medical Decision Making:   I have independently reviewed/ordered the following labs:    CBC with Differential:   Recent Labs     06/22/24  0425 06/23/24  0704   WBC 14.0* 13.3*   HGB 12.2* 10.8*   HCT 38.4* 33.2*   * 65*   LYMPHOPCT 3* 6*   MONOPCT 3 2   EOSPCT 0 0       BMP:  Recent Labs     06/21/24  1120 06/21/24 2018 06/22/24  0425 06/23/24  0704     --  137 140   K 4.5  --  4.8 3.4*   CL 95*  --  105 104   CO2 20  --  16* 22   BUN 78*  --  90* 70*   CREATININE 2.8*   < > 4.2* 3.9*   MG 2.2  --   --  2.3    < > = values in this interval not displayed.       Hepatic Function Panel:   Recent Labs     06/21/24  1120   BILITOT 1.2   ALKPHOS 77   ALT 41   AST 95*       No results for input(s): \"RPR\" in the last 72 hours.  No results for input(s): \"HIV\" in the last 72 hours.  No results for input(s): \"BC\" in the last 72 hours.  Lab Results   Component Value Date/Time    CREATININE 3.9 06/23/2024 07:04 AM    GLUCOSE 76 06/23/2024 07:04 AM       Detailed results:        Thank you for allowing us to participate in the care of this patient.Please call with questions.    This note is created with the assistance of a speech recognition program.  While intending to generate adocument that actually reflects the content of the visit, the document can 
osteomyelitis.  Per family, had similar presentation previously in setting of infection.  No history of seizures.  He remains intubated, on sedation.     Acute encephalopathy, likely toxic-metabolic as above     Plan:      Repeat CT brain with no acute findings.  Obtain MRI brain when stable (ordered).  Pending 1 hour EEG.  Will likely be tomorrow given limited tech coverage over the weekend.  Recommend daily sedation vacations, wean sedation as tolerated.  Rest of care per primary/ICU teams.        We will continue to follow.      Electronically signed by Shane Alves DO on 6/23/2024 at 11:49 AM      Shane Alves DO  [facility] [place]  Neurology  
 It such instances, actual meaningcan be extrapolated by contextual diversion.    Ying Rosaels MD  Office: [phone]  Perfect serve / office [phone]        
71* 81*   CREATININE 3.3* 3.6*       Hepatic Function Panel:   No results for input(s): \"PROT\", \"LABALBU\", \"BILIDIR\", \"IBILI\", \"BILITOT\", \"ALKPHOS\", \"ALT\", \"AST\" in the last 72 hours.    No results for input(s): \"RPR\" in the last 72 hours.  No results for input(s): \"HIV\" in the last 72 hours.  No results for input(s): \"BC\" in the last 72 hours.  Lab Results   Component Value Date/Time    CREATININE 3.6 06/28/2024 04:43 AM    GLUCOSE 289 06/28/2024 04:43 AM       Detailed results:        Thank you for allowing us to participate in the care of this patient.Please call with questions.    This note is created with the assistance of a speech recognition program.  While intending to generate adocument that actually reflects the content of the visit, the document can still have some errors including those of syntax and sound a like substitutions which may escape proof reading.  It such instances, actual meaningcan be extrapolated by contextual diversion.    Ying Rosales MD  Office: [phone]  Perfect serve / office [phone]        
form completed, orders changed  DC routine meds  DC routine labs and testing  D/w RN      Electronically signed by DEVIN Adam - CNP on 07/01/24     This progress note was completed using a voice transcription system. Every effort was made to ensure accuracy. However, inadvertent computerized transcription errors may be present.    HARLAN SUNSHINEP-BC, NP-C  [facility]  
osteomyelitis. 3. Moderate 1st metatarsophalangeal degenerative changes with a small effusion.  Septic arthritis is not excluded. 4. Extensive subcutaneous edema compatible with cellulitis. No well-defined drainable fluid collection identified. 5. Focus of susceptibility artifact plantar to the 1st distal phalanx corresponding to a small linear metallic foreign body seen on the recent radiographs.     XR CHEST PORTABLE    Result Date: 6/21/2024  EXAMINATION: ONE XRAY VIEW OF THE CHEST 6/21/2024 5:53 pm COMPARISON: 06/21/2024. HISTORY: ORDERING SYSTEM PROVIDED HISTORY: ET placement TECHNOLOGIST PROVIDED HISTORY: ET placement Reason for Exam: CXR due to pt. was intubated. FINDINGS: The cardiomediastinal silhouette is stable.  Mild dependent right basilar opacification, likely atelectasis.  The lungs otherwise are clear.  No overt failure.  Endotracheal 2 tip approximately 4.7 cm above the justine.  Enteric catheter extends below the lower margin of the image.     Mild right basilar atelectasis.  Satisfactory position of endotracheal tube.     CT HEAD WO CONTRAST    Result Date: 6/21/2024  EXAMINATION: CT OF THE HEAD WITHOUT CONTRAST  6/21/2024 12:21 pm TECHNIQUE: CT of the head was performed without the administration of intravenous contrast. Automated exposure control, iterative reconstruction, and/or weight based adjustment of the mA/kV was utilized to reduce the radiation dose to as low as reasonably achievable. COMPARISON: None. HISTORY: ORDERING SYSTEM PROVIDED HISTORY: Mental Status Changes TECHNOLOGIST PROVIDED HISTORY: Mental Status Changes Decision Support Exception - unselect if not a suspected or confirmed emergency medical condition->Emergency Medical Condition (MA) Reason for Exam: AMS Additional signs and symptoms: shaking, found down FINDINGS: BRAIN/VENTRICLES: There is no acute intracranial hemorrhage, mass effect, or midline shift.  There is satisfactory overall gray-white matter differentiation.  The 
well-defined drainable fluid collection identified. 5. Focus of susceptibility artifact plantar to the 1st distal phalanx corresponding to a small linear metallic foreign body seen on the recent radiographs.     XR CHEST PORTABLE    Result Date: 6/21/2024  EXAMINATION: ONE XRAY VIEW OF THE CHEST 6/21/2024 5:53 pm COMPARISON: 06/21/2024. HISTORY: ORDERING SYSTEM PROVIDED HISTORY: ET placement TECHNOLOGIST PROVIDED HISTORY: ET placement Reason for Exam: CXR due to pt. was intubated. FINDINGS: The cardiomediastinal silhouette is stable.  Mild dependent right basilar opacification, likely atelectasis.  The lungs otherwise are clear.  No overt failure.  Endotracheal 2 tip approximately 4.7 cm above the justine.  Enteric catheter extends below the lower margin of the image.     Mild right basilar atelectasis.  Satisfactory position of endotracheal tube.     CT HEAD WO CONTRAST    Result Date: 6/21/2024  EXAMINATION: CT OF THE HEAD WITHOUT CONTRAST  6/21/2024 12:21 pm TECHNIQUE: CT of the head was performed without the administration of intravenous contrast. Automated exposure control, iterative reconstruction, and/or weight based adjustment of the mA/kV was utilized to reduce the radiation dose to as low as reasonably achievable. COMPARISON: None. HISTORY: ORDERING SYSTEM PROVIDED HISTORY: Mental Status Changes TECHNOLOGIST PROVIDED HISTORY: Mental Status Changes Decision Support Exception - unselect if not a suspected or confirmed emergency medical condition->Emergency Medical Condition (MA) Reason for Exam: AMS Additional signs and symptoms: shaking, found down FINDINGS: BRAIN/VENTRICLES: There is no acute intracranial hemorrhage, mass effect, or midline shift.  There is satisfactory overall gray-white matter differentiation.  The ventricular structures are symmetric and unremarkable. The infratentorial structures are unremarkable. ORBITS: The visualized portion of the orbits demonstrate no acute abnormality. SINUSES: 
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 *        Plan     Patient examined and evaluated at bedside.   Treatment options discussed in detail with the patient.  Plain film radiographs ordered.  Negative for soft tissue emphysema, acute fracture or dislocation, foreign body, or osteomyelitis.  MRI of the left LE ordered to rule out osteomyelitis.  Serpiginous changes on T1 and T2 of the first metatarsal and proximal phalanx of the great toe consistent with osteomyelitis, overall degeneration with adjacent wound.  Abrupt changes on T1 and T2 of the second ray which are concerning for osteomyelitis, however there is a subacute fracture at the metatarsal neck which distorts this finding.  Still concerning with large adjacent ulceration  CT of the left LE ordered to rule out soft tissue gas.  No abscess or drainable fluid collection.  Healed second metatarsal fracture  Underwent knee debridement with orthopedics on 6/22/2024 with cultures pending   Noninvasive vascular studies ordered in light of potential amputation: Nursing can remove dressings as needed then reapplied.  ID consulted.  Abx: Cefepime  Patient has had AMANDA from vancomycin in the past.  
fluid collection or   drainable abscess.      No strong CT evidence for osteomyelitis.  Consider MRI with contrast to   further evaluate if appropriate.         CT TIBIA FIBULA LEFT WO CONTRAST   Final Result   Soft tissue edema.  Negative for focal abscess or fluid collection.      Large knee joint effusion.      Recommend MRI to further evaluate as appropriate.         XR CHEST PORTABLE   Final Result   Cardiomegaly without acute pulmonary process.         XR FOOT LEFT (MIN 3 VIEWS)   Final Result   Soft tissue wound medially with no plain film evidence of osteomyelitis.      Healing, nonacute fracture of the 2nd metatarsal neck.         XR CHEST PORTABLE    (Results Pending)   XR CHEST (SINGLE VIEW FRONTAL)    (Results Pending)   XR CHEST PORTABLE    (Results Pending)       Cultures:   Positive blood cultures x 2 - strep pyogenes  6/21/2024 swab cultures of left foot: Strep pyogenes, Enterobacter cloaca    Assessment     Ramon Roger is a 63 y.o. 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 
Cefepime  Patient has had AMANDA from vancomycin in the past.  Discontinue Vanco.  Plan: Diffuse changes throughout the medial aspect of the left foot adjacent to the ulcerations.  Likely osteomyelitis of the first metatarsal and digit with suspicion for osteomyelitis of the second ray and extending into the midfoot.  Follow knee aspirate results.  Following that, patient will likely need aggressive amputation as part of limb salvage.  Follow noninvasive vascular study results.  He does ambulate with a prosthetic.  Prognosis of his left limb is guarded.  Continue trending labs.  IV antibiotics per ID.  Likely transmetatarsal amputation of left foot early/mid week  Dressing applied to Left foot:   Medial foot: Betadine, 4 x 4's, Kerlix, Ace  First webspace: 1/4 in iodoform packing, Betadine, 4 x 4's, Kerlix, Ace  NWB to Left lower extremity.  Discussed with  Dr. Shantanu Law, DENIS   Foot and Ankle Surgery  6/23/2024 at 12:58 PM     I performed a history and physical examination of the patient and discussed management with the resident. I reviewed the resident’s note and agree with the documented findings and plan of care. Any areas of disagreement are noted on the chart. I was personally present for the key portions of any procedures. I have documented in the chart those procedures where I was not present during the key portions. I have reviewed the Podiatry Resident progress note. I agree with the chief complaint, past medical history, past surgical history, allergies, medications, social and family history as documented unless otherwise noted below. Documentation of the HPI, Physical Exam and Medical Decision Making performed by medical students or scribes is based on my personal performance of the HPI, PE and MDM. I have personally evaluated this patient and have completed at least one if not all key elements of the E/M (history, physical exam, and MDM). Additional findings are as noted.     Fredo 
time.  Rising CRP, ESR, white blood cell count likely due to source of infection within the knee.  Will defer to orthopedics at this time further surgical intervention.  NWB to Left lower extremity.  Discussed with  Dr. Fournier    Procedure in detail: Attention was directed to the left foot where a ulceration with above measurements and parameters (see above). Sharp excisional debridement of wound located at left foot performed down to and including the level of the cutaneous tissue via sharp curette. 100% of wound debrided. Adequate bleeding of the wound bed was noted. No signs of infection were present at this time. Post debridement measurements are as stated prior to.  Both wounds debrided.  No anesthesia required. Hemostasis obtained with direct pressure. Dressings were applied consisting of Adaptic, DSD.  Patient will likely benefit from some sort of collagen augmentation or graft application to heal wounds.      Alberto Hernandez DPM   Foot and Ankle Surgery      I performed a history and physical examination of the patient and discussed management with the resident. I reviewed the resident’s note and agree with the documented findings and plan of care. Any areas of disagreement are noted on the chart. I was personally present for the key portions of any procedures. I have documented in the chart those procedures where I was not present during the key portions. I have reviewed the Podiatry Resident progress note. I agree with the chief complaint, past medical history, past surgical history, allergies, medications, social and family history as documented unless otherwise noted below. Documentation of the HPI, Physical Exam and Medical Decision Making performed by medical students or scribes is based on my personal performance of the HPI, PE and MDM. I have personally evaluated this patient and have completed at least one if not all key elements of the E/M (history, physical exam, and MDM). Additional findings 
best images done for pt condition FINDINGS: There is no acute consolidation or effusion.  There is no pneumothorax.  The heart is enlarged.  The upper abdomen is unremarkable.  The extrathoracic soft tissues are unremarkable.     Cardiomegaly without acute pulmonary process.         Physical Examination:        Physical Exam  Constitutional:       Appearance: He is obese.      Comments: Intubated   Cardiovascular:      Rate and Rhythm: Tachycardia present.      Pulses: Normal pulses.      Heart sounds: Normal heart sounds. No murmur heard.  Pulmonary:      Effort: Pulmonary effort is normal.      Breath sounds: Normal breath sounds. No wheezing.   Abdominal:      General: Bowel sounds are normal.      Palpations: Abdomen is soft.      Tenderness: There is no abdominal tenderness.   Musculoskeletal:         General: No tenderness.      Right knee: Normal pulse (Popliteal pulse positive).      Left knee: Swelling and erythema present. Normal range of motion. Normal pulse (Popliteal pulse pulse).      Left lower leg: No edema.      Comments: Brain with 40 mL blood tinged output in the left knee joint      Right Lower Extremity: Right leg is amputated below knee.         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 
nonacute fracture of the 2nd metatarsal neck.     XR CHEST PORTABLE    Result Date: 6/21/2024  EXAMINATION: ONE XRAY VIEW OF THE CHEST 6/21/2024 11:44 am COMPARISON: None. HISTORY: ORDERING SYSTEM PROVIDED HISTORY: Sepsis TECHNOLOGIST PROVIDED HISTORY: Sepsis Reason for Exam: Sepsis, left foot wound; best images done for pt condition FINDINGS: There is no acute consolidation or effusion.  There is no pneumothorax.  The heart is enlarged.  The upper abdomen is unremarkable.  The extrathoracic soft tissues are unremarkable.     Cardiomegaly without acute pulmonary process.         Physical Examination:        Physical Exam  Constitutional:       Appearance: He is obese.      Comments: Intubated   Cardiovascular:      Rate and Rhythm: Tachycardia present.      Pulses: Normal pulses.      Heart sounds: Normal heart sounds. No murmur heard.  Pulmonary:      Effort: Pulmonary effort is normal.      Breath sounds: Normal breath sounds. No wheezing.   Abdominal:      General: Bowel sounds are normal.      Palpations: Abdomen is soft.      Tenderness: There is no abdominal tenderness.   Musculoskeletal:         General: No tenderness.      Right knee: Normal pulse (Popliteal pulse positive).      Left knee: Swelling and erythema present. Normal range of motion. Normal pulse (Popliteal pulse pulse).      Left lower leg: No edema.      Comments: Brain with 5 mL blood tinged output in the left knee joint      Right Lower Extremity: Right leg is amputated below knee.         Assessment:        Primary Problem  Osteomyelitis (HCC)    Active Hospital Problems    Diagnosis Date Noted    Acute respiratory failure (HCC) [J96.00] 06/24/2024    Goals of care, counseling/discussion [Z71.89] 06/24/2024    DNR (do not resuscitate) discussion [Z71.89] 06/24/2024    ACP (advance care planning) [Z71.89] 06/24/2024    Palliative care encounter [Z51.5] 06/24/2024    Acute renal failure (HCC) [N17.9] 06/23/2024    Atherosclerosis of native 
  Sodium, urine, random   Result Value Ref Range    Sodium, Ur 25 mmol/L   Protein / creatinine ratio, urine   Result Value Ref Range    Total Protein, Urine 2,477 mg/dL    Creatinine, Ur 289.6 (H) 39.0 - 259.0 mg/dL    Urine Total Protein Creatinine Ratio 8.55 (H) 0.00 - 0.20   Arterial Blood Gases   Result Value Ref Range    pH, Arterial 7.421 7.350 - 7.450    pCO2, Arterial 32.5 (L) 35.0 - 45.0 mmHg    pO2, Arterial 69.4 (L) 80.0 - 100.0 mmHg    HCO3, Arterial 21.1 (L) 22.0 - 26.0 mmol/L    Negative Base Excess, Art 3.4 (H) 0.0 - 2.0 mmol/L    O2 Sat, Arterial 92.1 (L) 95 - 98 %    Carboxyhemoglobin 1.5 0 - 5 %    Methemoglobin 1.1 0.0 - 1.9 %    Pt Temp 39.0     pH, Art, Temp Adj 7.391 7.350 - 7.450    pCO2, Art, Temp Adj 35.5 35.0 - 45.0    pO2, Art, Temp Adj 79.1 (L) 80.0 - 100.0 mmHg    O2 Device/Flow/% Cannula     Respiratory Rate 38     Suleiman Test PASS     Sample Site Right Radial Artery     Pt. Position SEMI-FOWLERS    Beta-Hydroxybutyrate   Result Value Ref Range    Beta-Hydroxybutyrate 0.39 (H) 0.02 - 0.27 mmol/L   Procalcitonin   Result Value Ref Range    Procalcitonin 65.82 (H) <0.09 ng/mL   Blood Gas, Arterial   Result Value Ref Range    pH, Arterial 7.225 (LL) 7.350 - 7.450    pCO2, Arterial 51.6 (HH) 35.0 - 45.0 mmHg    pO2, Arterial 351.0 (H) 80.0 - 100.0 mmHg    HCO3, Arterial 21.4 (L) 22.0 - 26.0 mmol/L    Negative Base Excess, Art 6.3 (H) 0.0 - 2.0 mmol/L    O2 Sat, Arterial 97.6 95 - 98 %    Methemoglobin 1.3 0.0 - 1.9 %    O2 Device/Flow/% VENTILATOR     Respiratory Rate 20     Suleiman Test PASS     Sample Site Left Radial Artery     Pt. Position SEMI-FOWLERS     Mode PRVC     Sed Rate 20     Total Rate 23          FIO2 100     Peep/Cpap 8     Text for Respiratory RESULT TO RN    Sedimentation Rate   Result Value Ref Range    Sed Rate, Automated 95 (H) 0 - 20 mm/Hr   C-Reactive Protein   Result Value Ref Range    .4 (H) 0.0 - 5.0 mg/L   Basic Metabolic Panel w/ Reflex to MG   Result 
infection (HCC) [E11.628, L08.9] 06/23/2024    Ulcer of left foot with necrosis of muscle ([facility]) [L97.523] 06/23/2024    Leg edema [R60.0] 06/23/2024    Thrombocytopenia (HCC) [D69.6] 06/23/2024    Anemia [D64.9] 06/23/2024    Pyogenic arthritis of left knee joint ([facility]) [M00.9] 06/22/2024    Osteomyelitis ([facility]) [M86.9] 06/21/2024    Traumatic rhabdomyolysis ([facility]) [T79.6XXA] 06/21/2024    Acute encephalopathy [G93.40] 06/21/2024       Plan:      Patient status Admit as inpatient in the  Medical ICU     Sepsis likely secondary to both left foot infection/Uti/septic left knee joint  -WBC count 21  -ABGs showed pH 7.35, pCO2 35  -On presentation hypoglycemic to 60, had another episode on admission 23 of hypoglycemia after 21  -Received 3 L of septic fluid bolus in ED, continued on 125 MIVF  -We changed normal saline to D5 normal saline because of recurrent hypoglycemia  -Lactic acid 3.9, repeat ordered pending  -CT scan of the foot ordered, no tissue gas noticed, bedside debridement done by podiatry. MRI did show 1st  metatarsal diaphysis and head as well as the 1st proximal phalanx compatible with osteomyelitis (check report)  -Podiatry following will appreciate their recommendation  -Bilateral lower extremity arterial duplex done recently shows no peripheral arterial disease  -Left lower extremity Doppler negative  -Patient likely have septic/hemarthrosis of left knee joint to very painful  -Orthopedics consulted for aspiration of fluid, status post arthroscopy yesterday with continuous drain  -May need amputation vascular surgery taken on board  -IIV vancomycin discontinued, seen ordered by ID  -Intensivist on board  -Urinalysis started, chest x-ray normal  -Blood cultures, urine culture, sputum culture wound culture ordered pending  -Blood culture positive for Group A strep pyogen     Rhabdomyolysis  -CK in ED 3392  -C CPK daily   -Nephrology on board, they changed IV fluid 2.9 normal saline with 10 mEq potassium at 
board  -Urinalysis N, chest x-ray normal  -Blood culture positive for Group A strep pyogen, knee culture shows Streptococcus pyogenes gram-positive cocci in pairs and in chains  -Foot wound culture shows gram-positive cocci in pairs and Streptococcus pyogenes  -Antibiotics changed to cefepime and daptomycin   Yesterday    acute Renal failure - dialysis dependent 2/2 Sepsis and Rhabdomyolysis(rhabdomyolysis improved)  -Patient will need tunneled dialysis catheter however due to positive blood culture patient is only on temporary dialysis catheter now  -Patient is getting dialysis, might need dialysis today, will follow nephrology recommendations for that  -Nephrology on board    Acute stroke on MRI 1 cm occipital lobe  -Neurology on board  -Permissive hypertension, blood pressure goal 160 above  -Neurology on board will follow their recommendations    Diabetes mellitus type 2  -Last HbA1c in May/2024 9.1  -Lispro Q4H according to parameters  -POCT Q4  -Repeat HbA1c 7.8  -low dose sliding scale changed to high dose sliding scale  -15 units Lantus switched to 25 units Lantus     Hypertension  -Norvasc 5 mg   -Hydralazine 100 mg 3 times daily      Hyperlipidemia  -Simvastatin 80 mg daily     Peripheral neuropathy due to diabetes mellitus  -Involving both upper and lower extremity  -Gabapentin 600 mg nightly(last dispensed in April/2024) - not continued  -Lyrica 150 mg daily(last dispensed in April/2024) - held     DVT prophylaxis: Heparin 5000 U 3 times daily  GI prophylaxis: IV Pepcid 20 mg daily  Diet: Tube feeds tolerating well  Disposition: LESLYE Bran MD  PGY I FM Resident  6/30/2024 7:52 AM   
nightly(last dispensed in April/2024) - not continued  -Lyrica 150 mg daily(last dispensed in April/2024) - held     DVT prophylaxis: Heparin 5000 U 3 times daily  GI prophylaxis: IV Pepcid 20 mg daily  Diet: NPO, OG Tube in  Disposition: TBD      Dorys Bran MD  PGY I Family Medicine Resident  6/23/2024 7:44 AM     Attending Physician Statement  I have discussed the care of Ramon Roger and I have examined the patient myself and taken ROS and HPI, including pertinent history and exam findings, with the resident. I have reviewed the key elements of all parts of the encounter with the resident.  I agree with the assessment, plan and orders as documented by the resident.      Electronically signed by Seferino Denny MD     
mg/dL    Creatinine 3.4 (H) 0.7 - 1.2 mg/dL    Est, Glom Filt Rate 19 (L) >60 mL/min/1.73m2    Calcium 7.5 (L) 8.6 - 10.4 mg/dL   CBC with Auto Differential   Result Value Ref Range    WBC 12.4 (H) 3.5 - 11.0 k/uL    RBC 3.98 (L) 4.5 - 5.9 m/uL    Hemoglobin 10.7 (L) 13.5 - 17.5 g/dL    Hematocrit 33.3 (L) 41 - 53 %    MCV 83.8 80 - 100 fL    MCH 26.9 26 - 34 pg    MCHC 32.1 31 - 37 g/dL    RDW 17.4 (H) 11.5 - 14.9 %    Platelets 74 (L) 150 - 450 k/uL    MPV 10.6 6.0 - 12.0 fL    Neutrophils % 68 (H) 36 - 66 %    Lymphocytes % 17 (L) 24 - 44 %    Monocytes % 5 1 - 7 %    Eosinophils % 1 0 - 4 %    Basophils % 0 0 - 2 %    Bands 8 0 - 10 %    Metamyelocytes 1 (H) 0 %    Neutrophils Absolute 8.44 1.3 - 9.1 k/uL    Lymphocytes Absolute 2.11 1.0 - 4.8 k/uL    Monocytes Absolute 0.62 0.1 - 1.3 k/uL    Eosinophils Absolute 0.12 0.0 - 0.4 k/uL    Basophils Absolute 0.00 0.0 - 0.2 k/uL    Absolute Bands 0.99 0.0 - 1.0 k/uL    Metamyelocytes Absolute 0.12 (H) 0 k/uL    Morphology ANISOCYTOSIS PRESENT     Morphology 1+ TEARDROPS     Morphology 1+ POLYCHROMASIA     Morphology TOXIC GRANULATION PRESENT    BLOOD GAS, ARTERIAL   Result Value Ref Range    pH, Arterial 7.441 7.350 - 7.450    pCO2, Arterial 37.4 35.0 - 45.0 mmHg    pO2, Arterial 123.0 (H) 80.0 - 100.0 mmHg    HCO3, Arterial 25.4 22.0 - 26.0 mmol/L    Positive Base Excess, Art 1.3 0.0 - 2.0 mmol/L    O2 Sat, Arterial 96.6 95 - 98 %    Carboxyhemoglobin 1.3 0 - 5 %    Methemoglobin 1.3 0.0 - 1.9 %    O2 Device/Flow/% VENTILATOR     Respiratory Rate 32     Suleiman Test PASS     Sample Site Right Radial Artery     Pt. Position SEMI-FOWLERS     Mode PRVC     Sed Rate 28     Total Rate 32          FIO2 30     Peep/Cpap 8     Text for Respiratory RESULT TO RN    CK   Result Value Ref Range    Total  39 - 308 U/L   Basic Metabolic Panel w/ Reflex to MG   Result Value Ref Range    Sodium 143 135 - 144 mmol/L    Potassium 3.9 3.7 - 5.3 mmol/L    Chloride 109 (H) 98 
RN    Triglyceride   Result Value Ref Range    Triglycerides 617 (H) <150 mg/dL   C-Reactive Protein   Result Value Ref Range    CRP 99.0 (H) 0.0 - 5.0 mg/L   Sedimentation Rate   Result Value Ref Range    Sed Rate, Automated 73 (H) 0 - 20 mm/Hr   Urinalysis with Microscopic   Result Value Ref Range    Color, UA Dark Yellow (A) Yellow    Turbidity UA Turbid (A) Clear    Glucose, Ur MOD (A) NEGATIVE mg/dL    Bilirubin, Urine NEGATIVE NEGATIVE    Ketones, Urine NEGATIVE NEGATIVE mg/dL    Specific Gravity, UA 1.014 1.000 - 1.030    Urine Hgb MOD (A) NEGATIVE    pH, Urine 6.0 5.0 - 8.0    Protein, UA 2+ (A) NEGATIVE mg/dL    Urobilinogen, Urine Normal 0.0 - 1.0 EU/dL    Nitrite, Urine NEGATIVE NEGATIVE    Leukocyte Esterase, Urine TRACE (A) NEGATIVE    WBC, UA 3 to 5 (A) 0 TO 5 /HPF    RBC, UA 21 TO 50 (A) 0 TO 2 /HPF    Casts UA 10 TO 20 (A) None /LPF    Casts UA HYALINE (A) None /LPF    Casts UA 10 TO 20 (A) None /LPF    Casts UA COARSELY GRANULAR (A) None /LPF    Epithelial Cells, UA 10 TO 20 /HPF    Bacteria, UA MODERATE (A) None   Immunotyping, Serum   Result Value Ref Range    ITYP Interpretation       Two zones of restrictionare present in the gammaglobulin region. Confirmed by immunotyping to be monoclonal IgG Lambda (0.46 g/dl) and monoclonal IgG Kappa (0.26 g/dl).    Pathologist Review ELECTRONICALLY SIGNED. ASH MIRANDA M.D.    Basic Metabolic Panel w/ Reflex to MG   Result Value Ref Range    Sodium 137 135 - 144 mmol/L    Potassium 3.9 3.7 - 5.3 mmol/L    Chloride 105 98 - 107 mmol/L    CO2 20 20 - 31 mmol/L    Anion Gap 12 9 - 17 mmol/L    Glucose 289 (H) 70 - 99 mg/dL    BUN 81 (H) 8 - 23 mg/dL    Creatinine 3.6 (H) 0.7 - 1.2 mg/dL    Est, Glom Filt Rate 18 (L) >60 mL/min/1.73m2    Calcium 7.7 (L) 8.6 - 10.4 mg/dL   CBC with Auto Differential   Result Value Ref Range    WBC 14.9 (H) 3.5 - 11.0 k/uL    RBC 3.78 (L) 4.5 - 5.9 m/uL    Hemoglobin 10.3 (L) 13.5 - 17.5 g/dL    Hematocrit 31.9 (L) 41 - 53 %    MCV 
arthrosis.  There is a healing 2nd metatarsal head fracture.  No aggressive lytic or blastic lesion.  No periosteal reaction. Soft Tissue: Soft tissue wound along the medial midfoot with probable overlying ointment which is increased in attenuation. There is mild diffuse soft tissue edema around the foot and extending into the lower leg, but no focal abscess or drainable fluid collection, mass or foreign body.  No soft tissue gas. Joint: No significant degenerative changes. No osseous erosions.     Healing 2nd metatarsal head fracture. Soft tissue injury with edema but no focal mass or fluid collection or drainable abscess. No strong CT evidence for osteomyelitis.  Consider MRI with contrast to further evaluate if appropriate.     XR FOOT LEFT (MIN 3 VIEWS)    Result Date: 6/21/2024  EXAMINATION: THREE XRAY VIEWS OF THE LEFT FOOT 6/21/2024 11:44 am COMPARISON: None. HISTORY: ORDERING SYSTEM PROVIDED HISTORY: Wound TECHNOLOGIST PROVIDED HISTORY: Wound Reason for Exam: Sepsis, left foot wound; best images done for pt condition FINDINGS: Soft tissue wound along the medial aspect of the foot overlying the 1st metatarsal.  Opaque material is noted along the skin surface.  No definite bony destructive changes are noted.  There is a healing fracture of the 2nd metatarsal neck.  Degenerative changes are present at the 1st MTP joint and interphalangeal joint of the great toe.  Lisfranc alignment is grossly maintained.     Soft tissue wound medially with no plain film evidence of osteomyelitis. Healing, nonacute fracture of the 2nd metatarsal neck.     XR CHEST PORTABLE    Result Date: 6/21/2024  EXAMINATION: ONE XRAY VIEW OF THE CHEST 6/21/2024 11:44 am COMPARISON: None. HISTORY: ORDERING SYSTEM PROVIDED HISTORY: Sepsis TECHNOLOGIST PROVIDED HISTORY: Sepsis Reason for Exam: Sepsis, left foot wound; best images done for pt condition FINDINGS: There is no acute consolidation or effusion.  There is no pneumothorax.  The heart

## 2024-07-02 LAB
MICROORGANISM SPEC CULT: NORMAL
MICROORGANISM SPEC CULT: NORMAL
SERVICE CMNT-IMP: NORMAL
SERVICE CMNT-IMP: NORMAL
SPECIMEN DESCRIPTION: NORMAL
SPECIMEN DESCRIPTION: NORMAL

## 2024-07-05 LAB
MICROORGANISM SPEC CULT: ABNORMAL
MICROORGANISM SPEC CULT: ABNORMAL
MICROORGANISM/AGENT SPEC: ABNORMAL
MICROORGANISM/AGENT SPEC: ABNORMAL
SERVICE CMNT-IMP: ABNORMAL
SPECIMEN DESCRIPTION: ABNORMAL

## (undated) DEVICE — GLOVE ORANGE PI 7 1/2   MSG9075

## (undated) DEVICE — 4-PORT MANIFOLD: Brand: NEPTUNE 2

## (undated) DEVICE — GLOVE ORTHO 8   MSG9480

## (undated) DEVICE — BANDAGE COBAN 4 IN COMPR W4INXL5YD FOAM COHESIVE QUIK STK SELF ADH SFT

## (undated) DEVICE — [TOMCAT CUTTER, ARTHROSCOPIC SHAVER BLADE,  DO NOT RESTERILIZE,  DO NOT USE IF PACKAGE IS DAMAGED,  KEEP DRY,  KEEP AWAY FROM SUNLIGHT]: Brand: FORMULA

## (undated) DEVICE — SUTURE PERMAHAND SZ 0 L30IN NONABSORBABLE BLK FSL L30MM 3/8 680H

## (undated) DEVICE — KIT DRN FLAT W/ 100CC EVAC 7MM FULL PERF

## (undated) DEVICE — ZIMMER® STERILE DISPOSABLE TOURNIQUET CUFF WITH PLC, DUAL PORT, SINGLE BLADDER, 30 IN. (76 CM)

## (undated) DEVICE — GLOVE SURG SZ 8 L12IN FNGR THK79MIL GRN LTX FREE

## (undated) DEVICE — GLOVE SURG SZ 75 L12IN FNGR THK79MIL GRN LTX FREE

## (undated) DEVICE — NEEDLE, QUINCKE, 18GX3.5": Brand: MEDLINE

## (undated) DEVICE — ST CHARLES KNEE ARTHRO: Brand: MEDLINE INDUSTRIES, INC.

## (undated) DEVICE — SPONGE DRN W4XL4IN RAYON/POLYESTER 6 PLY NONWOVEN PRECUT 2 PER PK

## (undated) DEVICE — SOLUTION IRRIG 3000ML LAC RINGERS ARTHROMTC PLAS CONT

## (undated) DEVICE — TUBING, SUCTION, 3/16" X 10', STRAIGHT: Brand: MEDLINE